# Patient Record
Sex: FEMALE | Race: WHITE | Employment: OTHER | RURAL
[De-identification: names, ages, dates, MRNs, and addresses within clinical notes are randomized per-mention and may not be internally consistent; named-entity substitution may affect disease eponyms.]

---

## 2018-09-19 PROBLEM — J45.909 REACTIVE AIRWAY DISEASE: Status: ACTIVE | Noted: 2018-09-19

## 2018-09-19 PROBLEM — F41.1 GAD (GENERALIZED ANXIETY DISORDER): Status: ACTIVE | Noted: 2018-09-19

## 2018-09-19 PROBLEM — K21.9 GASTROESOPHAGEAL REFLUX DISEASE WITHOUT ESOPHAGITIS: Status: ACTIVE | Noted: 2018-09-19

## 2018-09-19 PROBLEM — I10 HYPERTENSION, ESSENTIAL: Status: ACTIVE | Noted: 2018-09-19

## 2018-09-19 PROBLEM — B18.2 CHRONIC HEPATITIS C WITHOUT HEPATIC COMA (HCC): Status: ACTIVE | Noted: 2018-09-19

## 2018-09-19 PROBLEM — F17.200 SMOKER: Status: ACTIVE | Noted: 2018-09-19

## 2018-12-12 PROBLEM — B18.2 CHRONIC HEPATITIS C WITHOUT HEPATIC COMA (HCC): Status: RESOLVED | Noted: 2018-09-19 | Resolved: 2018-12-12

## 2019-01-30 ENCOUNTER — OFFICE VISIT (OUTPATIENT)
Dept: CARDIOLOGY CLINIC | Age: 59
End: 2019-01-30

## 2019-01-30 VITALS
HEIGHT: 62 IN | BODY MASS INDEX: 31.27 KG/M2 | WEIGHT: 169.9 LBS | HEART RATE: 78 BPM | DIASTOLIC BLOOD PRESSURE: 84 MMHG | SYSTOLIC BLOOD PRESSURE: 144 MMHG | RESPIRATION RATE: 18 BRPM | OXYGEN SATURATION: 94 %

## 2019-01-30 DIAGNOSIS — F17.200 SMOKER: ICD-10-CM

## 2019-01-30 DIAGNOSIS — K21.9 GASTROESOPHAGEAL REFLUX DISEASE WITHOUT ESOPHAGITIS: ICD-10-CM

## 2019-01-30 DIAGNOSIS — E78.5 DYSLIPIDEMIA: ICD-10-CM

## 2019-01-30 DIAGNOSIS — F41.1 GAD (GENERALIZED ANXIETY DISORDER): ICD-10-CM

## 2019-01-30 DIAGNOSIS — I10 HYPERTENSION, ESSENTIAL: Primary | ICD-10-CM

## 2019-01-30 DIAGNOSIS — R07.9 CHEST PAIN, UNSPECIFIED TYPE: ICD-10-CM

## 2019-01-30 PROBLEM — R07.2 PRECORDIAL PAIN: Status: ACTIVE | Noted: 2019-01-30

## 2019-01-30 RX ORDER — DRONABINOL 5 MG/1
5 CAPSULE ORAL AS NEEDED
COMMUNITY

## 2019-01-30 RX ORDER — CARVEDILOL 6.25 MG/1
6.25 TABLET ORAL 2 TIMES DAILY WITH MEALS
Qty: 180 TAB | Refills: 1 | Status: SHIPPED | OUTPATIENT
Start: 2019-01-30 | End: 2020-01-06

## 2019-01-30 NOTE — PROGRESS NOTES
Verified patient with two patient identifiers. Medications reviewed/approved by Dr. Michael Mobley. A verbal from Dr. Michael Mobley was given to remove any medications that were deleted during the visit. Medication(s) removed:  glecaprevir-pibrentasvir (MAVYRET) 100-40 mg tab    Chief Complaint   Patient presents with    Chest Pain     New patient evaluation - referred by Roland Parker NP     Hypertension     1. Have you been to the ER, urgent care clinic since your last visit? Hospitalized since your last visit? new pt.    2. Have you seen or consulted any other health care providers outside of the [address] [place] [address] since your last visit? Include any pap smears or colon screening. New pt.

## 2019-01-30 NOTE — PROGRESS NOTES
Demetri Sweet is a 60 y.o. female is here for new patient evaluation--intermittent CP, hypertension. Seen by Madisyn Brink NP 18 with c/o elevated blood pressure, headaches, dizziness, left arm pain, back pain, and nausea that started a few days ago. She also reports an occasional stabbing chest pain under the left breast that comes and goes. Saw her PCP the week prior and her BP was a little elevated but she was asymptomatic and had not taken her BP meds yet and she was under a lot of stress. At home she has been checking her BP and it has been running in the 180's and 190;s over 100's. She takes Coreg 3.125mg po BID, has been on this for 2 years now ever since she had her first cardiac cath. Her previous cardiologist Dr Sheela Espinal told her everything had \"looked good\" at that point but that she needed the Coreg to help \"regulate her heart. \" Has not seen him since last year. She just moved here from [place] and wanted to establish with  local cardiologist. She does smoke. Is on Lipitor 20mg daily for HLD. Points out that she has 2 xanthelasma lesions on her eyelid. Has not used any Nitro. Never had a blood clot but reports her dad had a MI and  before he turned 48 ot 60. EKG with RSR', PRWP (possible old ant MI), but unchanged. She had labs including Troponin, d-dimer neg, and stress MPI (Lexiscan) on  with normal perfusion/no infarct or ischemia, LVEF 65. Intermittent non-exertional/atypical CP. Smoking down to 5 cigs/day, some current URI sx with NP cough. Elevated chol in past, not checked recently--PCP had discussed possible statin, but pt did not want to take (concerned about liver, side effects, etc)--trying dietary rx.      Patient Active Problem List    Diagnosis Date Noted    Precordial pain 2019    Reactive airway disease 2018    Smoker 2018    JOHN (generalized anxiety disorder) 2018    Gastroesophageal reflux disease without esophagitis 2018  Hypertension, essential 09/19/2018      Lenora Serna MD  Past Medical History:   Diagnosis Date    Arthritis     Cancer [facility])     ovarian    Chronic HCV with state 2 hepatic fibrosis (HCC)     Gastroparesis     Lymph edema     Menopause     Psychiatric disorder     anxiety      Past Surgical History:   Procedure Laterality Date    HX APPENDECTOMY      HX BREAST BIOPSY Left     benign    HX CHOLECYSTECTOMY      HX GYN      total hysterectomy    HX HYSTERECTOMY      HX OOPHORECTOMY       Allergies   Allergen Reactions    Ampicillin Anaphylaxis    Codeine Anaphylaxis    Morphine Anaphylaxis    Penicillins Anaphylaxis      Family History   Problem Relation Age of Onset    Breast Cancer Sister       Social History     Socioeconomic History    Marital status:      Spouse name: Not on file    Number of children: Not on file    Years of education: Not on file    Highest education level: Not on file   Social Needs    Financial resource strain: Not on file    Food insecurity - worry: Not on file    Food insecurity - inability: Not on file   [employer] needs - medical: Not on file   [employer] needs - non-medical: Not on file   Occupational History    Not on file   Tobacco Use    Smoking status: Current Every Day Smoker     Types: Cigarettes    Smokeless tobacco: Never Used    Tobacco comment: 5 cigs/day   Substance and Sexual Activity    Alcohol use: No    Drug use: No    Sexual activity: Not on file   Other Topics Concern    Not on file   Social History Narrative    Not on file      Current Outpatient Medications   Medication Sig    dronabinol (MARINOL) 5 mg capsule Take 5 mg by mouth as needed.  aspirin (ASPIRIN) 325 mg tablet Take 325 mg by mouth daily.  carvedilol (COREG) 3.125 mg tablet Take 2 Tabs by mouth two (2) times daily (with meals).  ALPRAZolam (XANAX) 0.5 mg tablet Take 1 Tab by mouth three (3) times daily as needed for Anxiety.  Max Daily Amount: 1.5 mg.    cyclobenzaprine (FLEXERIL) 10 mg tablet Take 1 Tab by mouth two (2) times a day.  ondansetron (ZOFRAN ODT) 8 mg disintegrating tablet Take 8 mg by mouth every eight (8) hours as needed for Nausea.  pantoprazole (PROTONIX) 40 mg tablet Take 40 mg by mouth two (2) times a day.  gabapentin (NEURONTIN) 300 mg capsule Take 300 mg by mouth three (3) times daily as needed.  cholecalciferol, vitamin D3, (VITAMIN D3 PO) Take  by mouth every seven (7) days. 50,000 units    atorvastatin (LIPITOR) 20 mg tablet Take  by mouth daily.  nitroglycerin (NITROSTAT) 0.4 mg SL tablet 0.4 mg by SubLINGual route every five (5) minutes as needed for Chest Pain. Up to 3 doses. No current facility-administered medications for this visit. Review of Symptoms:    CONST  No weight change. No fever, chills, sweats    ENT No visual changes, URI sx, sore throat    CV  See HPI   RESP  No cough, or sputum, wheezing. Also see HPI   GI  No abdominal pain or change in bowel habits. No heartburn or dysphagia. No melena or rectal bleeding.   No dysuria, urgency, frequency, hematuria   MSKEL  No joint pain, swelling. No muscle pain. SKIN  No rash or lesions. NEURO  No headache, syncope, or seizure. No weakness, loss of sensation, or paresthesias. PSYCH  No low mood or depression  No anxiety. HE/LYMPH  No easy bruising, abnormal bleeding, or enlarged glands.         Physical ExamPhysical Exam:    Visit Vitals  Resp 18   Ht 5' 2\" (1.575 m)   Wt 169 lb 14.4 oz (77.1 kg)   BMI 31.08 kg/m²     Gen: NAD  HEENT:  PERRL, throat clear  Neck: no adenopathy, no thyromegaly, no JVD   Heart:  Regular,Nl S1S2,  no murmur, gallop or rub.   Lungs:  clear  Abdomen:   Soft, non-tender, bowel sounds are active.   Extremities:  No edema  Pulse: symmetric  Neuro: A&O times 3, No focal neuro deficits    Cardiographics    ECG: from 10/18, 12/18--NSR, RSR', PRWP    Labs:   Lab Results   Component Value Date/Time    Sodium 143 05/18/2018 06:15 PM    Potassium 3.8 05/18/2018 06:15 PM    Chloride 104 05/18/2018 06:15 PM    CO2 29 05/18/2018 06:15 PM    Anion gap 10 05/18/2018 06:15 PM    Glucose 107 (H) 05/18/2018 06:15 PM    BUN 14 05/18/2018 06:15 PM    Creatinine 0.70 05/18/2018 06:15 PM    BUN/Creatinine ratio 20 05/18/2018 06:15 PM    GFR est AA >60 05/18/2018 06:15 PM    GFR est non-AA >60 05/18/2018 06:15 PM    Calcium 9.1 05/18/2018 06:15 PM    Bilirubin, total 0.3 05/18/2018 06:15 PM    AST (SGOT) 15 05/18/2018 06:15 PM    Alk. phosphatase 77 05/18/2018 06:15 PM    Protein, total 8.2 05/18/2018 06:15 PM    Albumin 3.5 05/18/2018 06:15 PM    Globulin 4.7 (H) 05/18/2018 06:15 PM    A-G Ratio 0.7 (L) 05/18/2018 06:15 PM    ALT (SGPT) 22 05/18/2018 06:15 PM     Lab Results   Component Value Date/Time    CK 79 12/17/2018 12:55 PM     No results found for: CHOL, CHOLX, CHLST, CHOLV, 977465, HDL, LDL, LDLC, DLDLP, TGLX, TRIGL, TRIGP, CHHD, CHHDX  No results found for this or any previous visit. Assessment:         Patient Active Problem List    Diagnosis Date Noted    Precordial pain 01/30/2019    Reactive airway disease 09/19/2018    Smoker 09/19/2018    JOHN (generalized anxiety disorder) 09/19/2018    Gastroesophageal reflux disease without esophagitis 09/19/2018    Hypertension, essential 09/19/2018     60 y.o. female is here for new patient evaluation--intermittent CP, hypertension. Seen by Eliseo Chowdhury NP 12/7/18 with c/o elevated blood pressure, headaches, dizziness, left arm pain, back pain, and nausea that started a few days ago. She also reports an occasional stabbing chest pain under the left breast that comes and goes. Saw her PCP the week prior and her BP was a little elevated but she was asymptomatic and had not taken her BP meds yet and she was under a lot of stress. At home she has been checking her BP and it has been running in the 180's and 190;s over 100's.  She takes Coreg 3.125mg po BID, has been on this for 2 years now ever since she had her first cardiac cath. Her previous cardiologist Dr Levy Hoffman told her everything had \"looked good\" at that point but that she needed the Coreg to help \"regulate her heart. \" Has not seen him since last year. She just moved here from [place] and wanted to establish with  local cardiologist. She does smoke. Is on Lipitor 20mg daily for HLD. Points out that she has 2 xanthelasma lesions on her eyelid. Has not used any Nitro. Never had a blood clot but reports her dad had a MI and  before he turned 48 ot 60. EKG with RSR', PRWP (possible old ant MI), but unchanged. She had labs including Troponin, d-dimer neg, and stress MPI (Lexiscan) on  with normal perfusion/no infarct or ischemia, LVEF 65. Intermittent non-exertional/atypical CP. Smoking down to 5 cigs/day, some current URI sx with NP cough. 60 y.o. female is here for new patient evaluation--intermittent CP, hypertension. Seen by Delmer Oliva NP 18 with c/o elevated blood pressure, headaches, dizziness, left arm pain, back pain, and nausea that started a few days ago. She also reports an occasional stabbing chest pain under the left breast that comes and goes. Saw her PCP the week prior and her BP was a little elevated but she was asymptomatic and had not taken her BP meds yet and she was under a lot of stress. At home she has been checking her BP and it has been running in the 180's and 190;s over 100's. She takes Coreg 3.125mg po BID, has been on this for 2 years now ever since she had her first cardiac cath. Her previous cardiologist Dr Levy Hoffman told her everything had \"looked good\" at that point but that she needed the Coreg to help \"regulate her heart. \" Has not seen him since last year. She just moved here from [place] and wanted to establish with  local cardiologist. She does smoke. Is on Lipitor 20mg daily for HLD. Points out that she has 2 xanthelasma lesions on her eyelid.  Has not used any Nitro. Never had a blood clot but reports her dad had a MI and  before he turned 48 ot 60. EKG with RSR', PRWP (possible old ant MI), but unchanged. She had labs including Troponin, d-dimer neg, and stress MPI (Lexiscan) on  with normal perfusion/no infarct or ischemia, LVEF 65. Intermittent non-exertional/atypical CP. Smoking down to 5 cigs/day, some current URI sx with NP cough. Elevated chol in past, not checked recently--PCP had discussed possible statin, but pt did not want to take (concerned about liver, side effects, etc)--trying dietary rx. Plan:     Echo/doppler--call w/ results  Check lipids, CMP  Smoking cessation discusssed  Continue current meds Incl coreg at 6.25mg bid  Review outside records (incl cath)  Dietary rx (prob familiial hyperlipidemia, ? PCKS9 type drug as statin issues/concerns)  F/u in 6 months.     Shannan Pinedo MD

## 2019-01-31 DIAGNOSIS — E78.5 DYSLIPIDEMIA: ICD-10-CM

## 2019-02-22 ENCOUNTER — TELEPHONE (OUTPATIENT)
Dept: CARDIOLOGY CLINIC | Age: 59
End: 2019-02-22

## 2019-02-22 NOTE — TELEPHONE ENCOUNTER
----- Message from Vicki Bowman MD sent at 2/19/2019  1:04 PM EST -----  Regarding: echo  Advise echo looks fine, normal LV pumping function, valves ok (trace mitral regurg only). Thanks [employer]  Advise CMP normal, lipids look good--total chol 184,  (would like <100), HDL and TG normal.  Thanks [employer]      Spoke with the patient. Verified patient with two patient identifiers. Results given and questions answered. Patient verbalized understanding.     APPT:  Tuesday, September 03, 2019 10:20 AM

## 2020-01-06 RX ORDER — CARVEDILOL 6.25 MG/1
TABLET ORAL
Qty: 180 TAB | Refills: 1 | Status: SHIPPED | OUTPATIENT
Start: 2020-01-06 | End: 2020-08-28 | Stop reason: SDUPTHER

## 2020-03-18 PROBLEM — G89.29 CHRONIC BILATERAL BACK PAIN: Status: ACTIVE | Noted: 2020-03-18

## 2020-03-18 PROBLEM — F43.10 PTSD (POST-TRAUMATIC STRESS DISORDER): Status: ACTIVE | Noted: 2020-03-18

## 2020-03-18 PROBLEM — M54.9 CHRONIC BILATERAL BACK PAIN: Status: ACTIVE | Noted: 2020-03-18

## 2020-04-15 RX ORDER — CIPROFLOXACIN 500 MG/1
500 TABLET ORAL 2 TIMES DAILY
Qty: 20 TAB | Refills: 0 | Status: SHIPPED | OUTPATIENT
Start: 2020-04-15 | End: 2020-04-25

## 2020-06-03 ENCOUNTER — OFFICE VISIT (OUTPATIENT)
Dept: SURGERY | Age: 60
End: 2020-06-03

## 2020-06-03 VITALS
SYSTOLIC BLOOD PRESSURE: 132 MMHG | BODY MASS INDEX: 32.57 KG/M2 | WEIGHT: 177 LBS | HEART RATE: 78 BPM | HEIGHT: 62 IN | RESPIRATION RATE: 18 BRPM | TEMPERATURE: 98.4 F | DIASTOLIC BLOOD PRESSURE: 76 MMHG | OXYGEN SATURATION: 92 %

## 2020-06-03 DIAGNOSIS — R19.8 UMBILICAL DISCHARGE: Primary | ICD-10-CM

## 2020-06-03 NOTE — PATIENT INSTRUCTIONS
CT Scan of the Abdomen: About This Test 
What is it? A CT (computed tomography) scan uses X-rays to make detailed pictures of your body and structures inside your body. A CT scan of the abdomen (belly) can give your doctor information about your liver, pancreas, kidneys, and other structures in your belly. During the test, you will lie on a table that is attached to the [employer] Video. The CT scanner is a large doughnut-shaped machine. Why is this test done? A CT scan of the belly can help find problems such as kidney stones, infected pouches in the colon (diverticulitis), and appendicitis. It also helps find tumors and abscesses. How do you prepare for the test? 
In general, there's nothing you have to do before this test, unless your doctor tells you to. · Tell your doctor if you get nervous in tight spaces. You may get a medicine to help you relax. If you think you'll get this medicine, be sure to arrange a ride home. · You may be asked to not eat any solid foods starting the night before your scan. How is the test done? · You may have contrast material (dye) put into your arm through a tube called an IV. Or you may drink the contrast material, or it may be put through a tube into your bladder or rectum. · You will lie on a table that is attached to the CT scanner. · The table slides into the round opening of the scanner. The table will move during the scan. The scanner moves inside the doughnut-shaped casing around your body. · You will be asked to hold still during the scan. You may be asked to hold your breath for short periods. · You may be alone in the scanning room. But a technologist will watch you through a window and talk with you during the test. 
How does the test feel? The test will not cause pain. But some people feel nervous inside the CT scanner.  
If a medicine to help you relax (sedative) or dye is used, you may feel a quick sting or pinch when the IV is started. The dye may make you feel warm and flushed and give you a metallic taste in your mouth. Some people feel sick to their stomach or get a headache. Tell the technologist or your doctor how you are feeling. How long does the test take? The test will take about 30 to 60 minutes. Most of this time is spent getting ready for the scan. The actual test only takes a few minutes. What are the risks of the test? 
The chance of a CT scan causing a problem is small. · There is a chance of an allergic reaction to the dye (contrast material). · If you breastfeed and are concerned about whether the contrast material used in this test is safe, talk to your doctor. Most experts believe that very little dye passes into breast milk and even less is passed on to the baby. But if you are concerned, you can stop breastfeeding for up to 24 hours after the test. During this time, you can give your baby breast milk that you stored before the test. Don't use the breast milk you pump in the 24 hours after the test. Throw it out. · There is a risk of damage to cells or tissue from being exposed to radiation, including the small amounts used in CTs, X-rays, and other medical tests. Over time, exposure to radiation may cause cancer and other health problems. But in most cases, the risk of getting cancer from being exposed to small amounts of radiation is low. It is not a reason to avoid these tests for most people. What happens after the test? 
· You will probably be able to go home right away, depending on the reason for the test. 
· You can go back to your usual activities right away. · If dye was used, drink lots of liquids for 24 hours after the test, unless your doctor says not to. When should you call for help? Watch closely for changes in your health, and be sure to contact your doctor if you have any problems. Follow-up care is a key part of your treatment and safety.  Be sure to make and go to all appointments, and call your doctor if you are having problems. It's also a good idea to keep a list of the medicines you take. Ask your doctor when you can expect to have your test results. Where can you learn more? Go to http://beth-ellie.info/ Enter T679 in the search box to learn more about \"CT Scan of the Abdomen: About This Test.\" Current as of: December 9, 2019               Content Version: 12.5 © 3368-8884 [employer]. Care instructions adapted under license by [employer] (which disclaims liability or warranty for this information). If you have questions about a medical condition or this instruction, always ask your healthcare professional. [address] any warranty or liability for your use of this information.

## 2020-06-03 NOTE — PROGRESS NOTES
1. Have you been to the ER, urgent care clinic since your last visit? Hospitalized since your last visit? No    2. Have you seen or consulted any other health care providers outside of the [address] [place] [address] since your last visit? Include any pap smears or colon screening.  No

## 2020-06-03 NOTE — PROGRESS NOTES
Kalpana Arevalo is a 60 y.o. female who presents today with the following:  Chief Complaint   Patient presents with    Umbilical Hernia     drainage       HPI    60-year-old female who presents to us as a referral by Dr. Mary Alice Wright for evaluation of a chronic problem she has had at her umbilicus. About 5 years ago she [place] She is unaware if they utilized mesh or not. She states within a few months of the procedure she developed discomfort in that area and it sounds like she had a ultrasound-guided aspiration of a seroma. About a year and a half ago she started to have pressure and intermittent drainage from her umbilicus. She also would occasionally have fevers. She was placed on multiple rounds of antibiotics and this would wax and wane. Again she has had multiple episodes over the last year and a half. She also has some discomfort in the area and is sent to us for evaluation. The last time she had any drainage was about 3 weeks ago. She does take 650 mg of aspirin a day and states that her EKG has shown that she has had an MI in the past although she never recalled having any symptoms. She smokes about 3 cigarettes a day and about a year ago she used to smoke up to a pack a day. She has had a hysterectomy and a subsequent bilateral salpingo-oophorectomy.   Past Medical History:   Diagnosis Date    Arthritis     Cancer ([address]     ovarian    Chronic HCV with state 2 hepatic fibrosis (HCC)     Dyslipidemia     Gastroparesis     Lymph edema     Menopause     Psychiatric disorder     anxiety       Past Surgical History:   Procedure Laterality Date    HX APPENDECTOMY      HX BREAST BIOPSY Left     benign    HX CHOLECYSTECTOMY      HX GYN      total hysterectomy    HX HYSTERECTOMY      HX OOPHORECTOMY         Social History     Socioeconomic History    Marital status:      Spouse name: Not on file    Number of children: Not on file    Years of education: Not on file    Highest education level: Not on file   Occupational History    Not on file   Social Needs    Financial resource strain: Not on file    Food insecurity     Worry: Not on file     Inability: Not on file    Transportation needs     Medical: Not on file     Non-medical: Not on file   Tobacco Use    Smoking status: Current Every Day Smoker     Types: Cigarettes    Smokeless tobacco: Never Used    Tobacco comment: 5 cigs/day   Substance and Sexual Activity    Alcohol use: No    Drug use: No    Sexual activity: Not on file   Lifestyle    Physical activity     Days per week: Not on file     Minutes per session: Not on file    Stress: Not on file   Relationships    Social connections     Talks on phone: Not on file     Gets together: Not on file     Attends Oriental orthodox service: Not on file     Active member of club or organization: Not on file     Attends meetings of clubs or organizations: Not on file     Relationship status: Not on file    Intimate partner violence     Fear of current or ex partner: Not on file     Emotionally abused: Not on file     Physically abused: Not on file     Forced sexual activity: Not on file   Other Topics Concern    Not on file   Social History Narrative    Not on file       Family History   Problem Relation Age of Onset    Breast Cancer Sister     No Known Problems Mother        Allergies   Allergen Reactions    Ampicillin Anaphylaxis    Codeine Anaphylaxis    Morphine Anaphylaxis    Penicillins Anaphylaxis       Current Outpatient Medications   Medication Sig    ALPRAZolam (Xanax) 0.5 mg tablet Take 1 Tab by mouth three (3) times daily as needed for Anxiety. Max Daily Amount: 1.5 mg.    cyclobenzaprine (FLEXERIL) 10 mg tablet Take 1 Tab by mouth two (2) times a day. Indications: as needed    gabapentin (NEURONTIN) 300 mg capsule Take 1 Cap by mouth three (3) times daily as needed for Pain. Max Daily Amount: 900 mg.     hydroCHLOROthiazide (HYDRODIURIL) 25 mg tablet Take 1 Tab by mouth daily.  carvedilol (COREG) 6.25 mg tablet TAKE 1 TABLET BY MOUTH TWICE DAILY WITH MEALS    albuterol (PROVENTIL HFA, VENTOLIN HFA, PROAIR HFA) 90 mcg/actuation inhaler Take 2 Puffs by inhalation every four (4) hours as needed for Wheezing.  Nebulizer & Compressor machine Use q 4 hours as needed for dyspnea or wheezing    albuterol (PROVENTIL VENTOLIN) 2.5 mg /3 mL (0.083 %) nebulizer solution 3 mL by Nebulization route every four (4) hours as needed for Wheezing.  dronabinol (MARINOL) 5 mg capsule Take 5 mg by mouth as needed.  aspirin (ASPIRIN) 325 mg tablet Take 325 mg by mouth daily.  ondansetron (ZOFRAN ODT) 8 mg disintegrating tablet Take 8 mg by mouth every eight (8) hours as needed for Nausea.  pantoprazole (PROTONIX) 40 mg tablet Take 40 mg by mouth two (2) times a day.  nitroglycerin (NITROSTAT) 0.4 mg SL tablet 0.4 mg by SubLINGual route every five (5) minutes as needed for Chest Pain. Up to 3 doses.  cholecalciferol, vitamin D3, (VITAMIN D3 PO) Take  by mouth every seven (7) days. 50,000 units     No current facility-administered medications for this visit. The above histories, medications and allergies have been reviewed. Review of Systems   HENT: Positive for tinnitus. Cardiovascular: Positive for chest pain. Gastrointestinal: Positive for abdominal pain, heartburn and nausea. Genitourinary: Positive for frequency. Musculoskeletal: Positive for back pain, falls, joint pain and myalgias. Endo/Heme/Allergies: Bruises/bleeds easily. Psychiatric/Behavioral: The patient is nervous/anxious and has insomnia. Visit Vitals  /76 (BP 1 Location: Left arm, BP Patient Position: Sitting)   Pulse 78   Temp 98.4 °F (36.9 °C) (Temporal)   Resp 18   Ht 5' 2\" (1.575 m)   Wt 177 lb (80.3 kg)   SpO2 92%   BMI 32.37 kg/m²     Physical Exam  Constitutional:       Appearance: She is obese.    Cardiovascular:      Rate and Rhythm: Normal rate and regular rhythm. Pulmonary:      Effort: Pulmonary effort is normal.      Breath sounds: Normal breath sounds. Abdominal:      General: There is no distension. Palpations: Abdomen is soft. Tenderness: There is no abdominal tenderness. Hernia: No hernia is present. Comments: There is no active drainage from her umbilicus. There is no erythema or induration. On palpation there is a little bit of nodularity that may represent either sutures or mesh but no fluctuance. Neurological:      Mental Status: She is alert. 1. Umbilical discharge  Chronic recurrent umbilical drainage in a patient who about 5 years ago had an umbilical hernia repair. We are uncertain if these measure they did not. I would like to start with a CT scan of the abdomen and pelvis to rule out evidence of a fistula. We will have her follow-up after imaging.  - CT ABD PELV W CONT; Future      Follow-up and Dispositions    · Return in about 2 weeks (around 6/17/2020) for after testing.          Jany Grove MD

## 2020-07-14 ENCOUNTER — OFFICE VISIT (OUTPATIENT)
Dept: PRIMARY CARE CLINIC | Age: 60
End: 2020-07-14

## 2020-07-14 DIAGNOSIS — Z20.828 EXPOSURE TO SARS-ASSOCIATED CORONAVIRUS: Primary | ICD-10-CM

## 2020-07-19 LAB — SARS-COV-2, NAA: NOT DETECTED

## 2020-08-31 RX ORDER — CARVEDILOL 6.25 MG/1
6.25 TABLET ORAL 2 TIMES DAILY WITH MEALS
Qty: 60 TAB | Refills: 0 | Status: SHIPPED | OUTPATIENT
Start: 2020-08-31 | End: 2020-09-22

## 2020-09-22 RX ORDER — CARVEDILOL 6.25 MG/1
TABLET ORAL
Qty: 60 TAB | Refills: 0 | Status: SHIPPED | OUTPATIENT
Start: 2020-09-22 | End: 2020-10-20 | Stop reason: SDUPTHER

## 2020-10-01 PROBLEM — R19.8 UMBILICAL DISCHARGE: Status: RESOLVED | Noted: 2020-06-03 | Resolved: 2020-10-01

## 2020-10-01 PROBLEM — R07.2 PRECORDIAL PAIN: Status: RESOLVED | Noted: 2019-01-30 | Resolved: 2020-10-01

## 2021-05-11 ENCOUNTER — OFFICE VISIT (OUTPATIENT)
Dept: FAMILY MEDICINE CLINIC | Age: 61
End: 2021-05-11
Payer: MEDICARE

## 2021-05-11 VITALS
DIASTOLIC BLOOD PRESSURE: 86 MMHG | WEIGHT: 168 LBS | OXYGEN SATURATION: 97 % | SYSTOLIC BLOOD PRESSURE: 122 MMHG | RESPIRATION RATE: 20 BRPM | HEART RATE: 79 BPM | TEMPERATURE: 97.6 F | HEIGHT: 62 IN | BODY MASS INDEX: 30.91 KG/M2

## 2021-05-11 DIAGNOSIS — M25.511 CHRONIC PAIN OF BOTH SHOULDERS: ICD-10-CM

## 2021-05-11 DIAGNOSIS — I10 HYPERTENSION, ESSENTIAL: Primary | ICD-10-CM

## 2021-05-11 DIAGNOSIS — G89.29 CHRONIC PAIN OF BOTH SHOULDERS: ICD-10-CM

## 2021-05-11 DIAGNOSIS — M25.512 CHRONIC PAIN OF BOTH SHOULDERS: ICD-10-CM

## 2021-05-11 PROCEDURE — G8417 CALC BMI ABV UP PARAM F/U: HCPCS | Performed by: FAMILY MEDICINE

## 2021-05-11 PROCEDURE — 99213 OFFICE O/P EST LOW 20 MIN: CPT | Performed by: FAMILY MEDICINE

## 2021-05-11 PROCEDURE — G9899 SCRN MAM PERF RSLTS DOC: HCPCS | Performed by: FAMILY MEDICINE

## 2021-05-11 PROCEDURE — G8510 SCR DEP NEG, NO PLAN REQD: HCPCS | Performed by: FAMILY MEDICINE

## 2021-05-11 PROCEDURE — 3017F COLORECTAL CA SCREEN DOC REV: CPT | Performed by: FAMILY MEDICINE

## 2021-05-11 PROCEDURE — G8427 DOCREV CUR MEDS BY ELIG CLIN: HCPCS | Performed by: FAMILY MEDICINE

## 2021-05-11 PROCEDURE — G8754 DIAS BP LESS 90: HCPCS | Performed by: FAMILY MEDICINE

## 2021-05-11 PROCEDURE — G8752 SYS BP LESS 140: HCPCS | Performed by: FAMILY MEDICINE

## 2021-05-11 NOTE — PROGRESS NOTES
Justice Hyde is a 63 y.o. female who presents with the following:  Chief Complaint   Patient presents with    Hypertension    Shoulder Pain       Patient had been having pain in her shoulders and in her head and while in pain she will check her blood pressure and at home the systolic would be elevated. When she is here today her blood pressure is normal.  I told the patient that she should bring her blood pressure cuff she had to be checked against ours which she has not done yet and she should not be taking her blood pressure when she is in pain because that will make her blood pressure go up. The patient is having more difficulty moving her shoulders now especially the left one as she is developing frozen shoulder and needs to go to physical therapy. Allergies   Allergen Reactions    Ampicillin Anaphylaxis    Codeine Anaphylaxis    Morphine Anaphylaxis    Penicillins Anaphylaxis       Current Outpatient Medications   Medication Sig    hydroCHLOROthiazide (HYDRODIURIL) 25 mg tablet Take 1 tablet by mouth once daily    cyclobenzaprine (FLEXERIL) 10 mg tablet TAKE 1 TABLET BY MOUTH TWICE A DAY AS NEEDED    ALPRAZolam (Xanax) 0.5 mg tablet Take 1 Tab by mouth three (3) times daily as needed for Anxiety. Max Daily Amount: 1.5 mg.    carvediloL (COREG) 12.5 mg tablet Take 1 pill BID    citalopram (CELEXA) 20 mg tablet Take 1 Tab by mouth daily.  albuterol (PROVENTIL VENTOLIN) 2.5 mg /3 mL (0.083 %) nebu 3 mL by Nebulization route every four (4) hours as needed for Wheezing.  gabapentin (NEURONTIN) 300 mg capsule Take 1 Cap by mouth three (3) times daily as needed for Pain. Max Daily Amount: 900 mg.    albuterol (PROVENTIL HFA, VENTOLIN HFA, PROAIR HFA) 90 mcg/actuation inhaler Take 2 Puffs by inhalation every four (4) hours as needed for Wheezing.     Nebulizer & Compressor machine Use q 4 hours as needed for dyspnea or wheezing    dronabinol (MARINOL) 5 mg capsule Take 5 mg by mouth as needed.  aspirin (ASPIRIN) 325 mg tablet Take 325 mg by mouth daily.  ondansetron (ZOFRAN ODT) 8 mg disintegrating tablet Take 8 mg by mouth every eight (8) hours as needed for Nausea.  pantoprazole (PROTONIX) 40 mg tablet Take 40 mg by mouth two (2) times a day.  nitroglycerin (NITROSTAT) 0.4 mg SL tablet 0.4 mg by SubLINGual route every five (5) minutes as needed for Chest Pain. Up to 3 doses. No current facility-administered medications for this visit. Past Medical History:   Diagnosis Date    Arthritis     Cancer ([facility] [address]     ovarian    Chronic HCV with state 2 hepatic fibrosis (HCC)     Dyslipidemia     Gastroparesis     Lymph edema     Menopause     Precordial pain 1/30/2019    Psychiatric disorder     anxiety    Umbilical discharge 2/7/0573       Past Surgical History:   Procedure Laterality Date    HX APPENDECTOMY      HX BREAST BIOPSY Left     benign    HX CHOLECYSTECTOMY      HX GYN      total hysterectomy    HX HYSTERECTOMY      HX OOPHORECTOMY         Family History   Problem Relation Age of Onset    Breast Cancer Sister     No Known Problems Mother        Social History     Socioeconomic History    Marital status:      Spouse name: Not on file    Number of children: Not on file    Years of education: Not on file    Highest education level: Not on file   Tobacco Use    Smoking status: Current Every Day Smoker     Types: Cigarettes    Smokeless tobacco: Never Used    Tobacco comment: 5 cigs/day   Substance and Sexual Activity    Alcohol use: No    Drug use: No       Review of Systems   Constitutional: Negative for chills, fever, malaise/fatigue and weight loss. HENT: Positive for tinnitus. Negative for congestion, hearing loss and sore throat. Eyes: Negative for blurred vision, pain and discharge. Respiratory: Negative for cough, shortness of breath and wheezing.     Cardiovascular: Negative for chest pain, palpitations, orthopnea, claudication and leg swelling. Gastrointestinal: Negative for abdominal pain, constipation and heartburn. Genitourinary: Negative for dysuria, frequency and urgency. Musculoskeletal: Positive for joint pain. Negative for falls and myalgias. Skin: Negative for itching and rash. Neurological: Positive for dizziness and headaches. Negative for tingling and tremors. Endo/Heme/Allergies: Negative for environmental allergies and polydipsia. Psychiatric/Behavioral: Negative for depression and substance abuse. The patient is not nervous/anxious. Visit Vitals  /86 (BP 1 Location: Left arm)   Pulse 79   Temp 97.6 °F (36.4 °C)   Resp 20   Ht 5' 2\" (1.575 m)   Wt 168 lb (76.2 kg)   SpO2 97%   BMI 30.73 kg/m²     Physical Exam  Vitals signs reviewed. Constitutional:       General: She is not in acute distress. Appearance: Normal appearance. She is obese. She is not ill-appearing. HENT:      Head: Normocephalic and atraumatic. Right Ear: Tympanic membrane, ear canal and external ear normal.      Left Ear: Tympanic membrane, ear canal and external ear normal.      Nose: Nose normal. No congestion or rhinorrhea. Mouth/Throat:      Mouth: Mucous membranes are moist.      Pharynx: No posterior oropharyngeal erythema. Eyes:      General:         Right eye: No discharge. Left eye: No discharge. Extraocular Movements: Extraocular movements intact. Conjunctiva/sclera: Conjunctivae normal.      Pupils: Pupils are equal, round, and reactive to light. Comments: Cornea anterior chamber and iris are normal.   Neck:      Musculoskeletal: Normal range of motion and neck supple. Trachea: No tracheal deviation. Cardiovascular:      Rate and Rhythm: Normal rate and regular rhythm. Pulses: Normal pulses. Heart sounds: Normal heart sounds. No murmur. No friction rub. No gallop. Pulmonary:      Effort: Pulmonary effort is normal. No respiratory distress.       Breath sounds: Normal breath sounds. No wheezing or rhonchi. Chest:      Chest wall: No tenderness. Abdominal:      General: Bowel sounds are normal. There is no distension. Palpations: Abdomen is soft. There is no mass. Tenderness: There is no abdominal tenderness. There is no guarding or rebound. Musculoskeletal:         General: Tenderness (In both shoulder joints and in the left thigh) present. No deformity. Right lower leg: No edema. Left lower leg: No edema. Lymphadenopathy:      Cervical: No cervical adenopathy. Skin:     General: Skin is warm and dry. Coloration: Skin is not pale. Findings: No erythema or rash. Neurological:      General: No focal deficit present. Mental Status: She is alert and oriented to person, place, and time. Cranial Nerves: No cranial nerve deficit. Motor: No abnormal muscle tone. Deep Tendon Reflexes: Reflexes are normal and symmetric. Reflexes normal.      Comments: Cranial nerves II through XII are intact sensory and motor. Biceps triceps knee and ankle DTRs are normal and symmetrical.   Psychiatric:         Mood and Affect: Mood normal.         Behavior: Behavior normal.           ICD-10-CM ICD-9-CM    1. Hypertension, essential  I10 401.9    2.  Chronic pain of both shoulders  M25.511 719.41 REFERRAL TO PHYSICAL THERAPY    G89.29 338.29     M25.512         Orders Placed This Encounter    REFERRAL TO PHYSICAL THERAPY     Referral Priority:   Routine     Referral Type:   PT/OT/ST     Referral Reason:   Specialty Services Required     Requested Specialty:   Physical Therapy     Number of Visits Requested:   1           Caitlin Shaw MD

## 2021-05-11 NOTE — PROGRESS NOTES
1. Have you been to the ER, urgent care clinic since your last visit? Hospitalized since your last visit?no    2. Have you seen or consulted any other health care providers outside of the [address] [place] [address] since your last visit? Include any pap smears or colon screening.  no

## 2021-05-20 ENCOUNTER — OFFICE VISIT (OUTPATIENT)
Dept: FAMILY MEDICINE CLINIC | Age: 61
End: 2021-05-20
Payer: MEDICARE

## 2021-05-20 VITALS
HEIGHT: 62 IN | HEART RATE: 66 BPM | BODY MASS INDEX: 31.65 KG/M2 | SYSTOLIC BLOOD PRESSURE: 130 MMHG | DIASTOLIC BLOOD PRESSURE: 70 MMHG | TEMPERATURE: 97.6 F | WEIGHT: 172 LBS | RESPIRATION RATE: 18 BRPM | OXYGEN SATURATION: 93 %

## 2021-05-20 DIAGNOSIS — F43.10 PTSD (POST-TRAUMATIC STRESS DISORDER): ICD-10-CM

## 2021-05-20 DIAGNOSIS — F41.1 GAD (GENERALIZED ANXIETY DISORDER): Primary | ICD-10-CM

## 2021-05-20 PROCEDURE — G8754 DIAS BP LESS 90: HCPCS | Performed by: FAMILY MEDICINE

## 2021-05-20 PROCEDURE — 99213 OFFICE O/P EST LOW 20 MIN: CPT | Performed by: FAMILY MEDICINE

## 2021-05-20 PROCEDURE — G8427 DOCREV CUR MEDS BY ELIG CLIN: HCPCS | Performed by: FAMILY MEDICINE

## 2021-05-20 PROCEDURE — 3017F COLORECTAL CA SCREEN DOC REV: CPT | Performed by: FAMILY MEDICINE

## 2021-05-20 PROCEDURE — G9899 SCRN MAM PERF RSLTS DOC: HCPCS | Performed by: FAMILY MEDICINE

## 2021-05-20 PROCEDURE — G8752 SYS BP LESS 140: HCPCS | Performed by: FAMILY MEDICINE

## 2021-05-20 PROCEDURE — G8510 SCR DEP NEG, NO PLAN REQD: HCPCS | Performed by: FAMILY MEDICINE

## 2021-05-20 PROCEDURE — G8417 CALC BMI ABV UP PARAM F/U: HCPCS | Performed by: FAMILY MEDICINE

## 2021-05-20 RX ORDER — ALPRAZOLAM 0.5 MG/1
0.5 TABLET ORAL
Qty: 90 TABLET | Refills: 2 | Status: SHIPPED | OUTPATIENT
Start: 2021-05-20 | End: 2021-09-28 | Stop reason: SDUPTHER
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
## 2021-05-20 NOTE — PROGRESS NOTES
1. Have you been to the ER, urgent care clinic since your last visit? Hospitalized since your last visit?no    2. Have you seen or consulted any other health care providers outside of the [address] [place] [address] since your last visit? Include any pap smears or colon screening.  no

## 2021-06-16 ENCOUNTER — OFFICE VISIT (OUTPATIENT)
Dept: FAMILY MEDICINE CLINIC | Age: 61
End: 2021-06-16
Payer: MEDICARE

## 2021-06-16 VITALS
HEIGHT: 62 IN | BODY MASS INDEX: 30.78 KG/M2 | TEMPERATURE: 98 F | DIASTOLIC BLOOD PRESSURE: 80 MMHG | RESPIRATION RATE: 18 BRPM | OXYGEN SATURATION: 96 % | WEIGHT: 167.25 LBS | SYSTOLIC BLOOD PRESSURE: 136 MMHG | HEART RATE: 70 BPM
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
## 2021-06-16 DIAGNOSIS — M25.552 HIP PAIN, ACUTE, LEFT: ICD-10-CM

## 2021-06-16 DIAGNOSIS — Z01.419 GYNECOLOGIC EXAM NORMAL: Primary | ICD-10-CM

## 2021-06-16 PROCEDURE — G9899 SCRN MAM PERF RSLTS DOC: HCPCS | Performed by: FAMILY MEDICINE

## 2021-06-16 PROCEDURE — G8754 DIAS BP LESS 90: HCPCS | Performed by: FAMILY MEDICINE

## 2021-06-16 PROCEDURE — 3017F COLORECTAL CA SCREEN DOC REV: CPT | Performed by: FAMILY MEDICINE

## 2021-06-16 PROCEDURE — G8752 SYS BP LESS 140: HCPCS | Performed by: FAMILY MEDICINE

## 2021-06-16 PROCEDURE — G8417 CALC BMI ABV UP PARAM F/U: HCPCS | Performed by: FAMILY MEDICINE

## 2021-06-16 PROCEDURE — 99396 PREV VISIT EST AGE 40-64: CPT | Performed by: FAMILY MEDICINE

## 2021-06-16 PROCEDURE — G8510 SCR DEP NEG, NO PLAN REQD: HCPCS | Performed by: FAMILY MEDICINE

## 2021-06-16 NOTE — PROGRESS NOTES
1. Have you been to the ER, urgent care clinic since your last visit? Hospitalized since your last visit? No    2. Have you seen or consulted any other health care providers outside of the [address] [place] [address] since your last visit? Include any pap smears or colon screening.  No

## 2021-06-16 NOTE — PROGRESS NOTES
Paty Barrera is a 63 y.o. female who presents with the following:  Chief Complaint   Patient presents with   Meera Espinal Exam       Patient in today for general medical examination and GYN examination. Patient is status post hysterectomy and later a salpingo-oophorectomy. Patient states that her right breast is recovering from her fall where she bruised it rather severely but there is a brownish staining on it. The patient also complains of having persisting pain in the left hip that is gotten worse since the fall. Allergies   Allergen Reactions    Ampicillin Anaphylaxis    Codeine Anaphylaxis    Morphine Anaphylaxis    Penicillins Anaphylaxis       Current Outpatient Medications   Medication Sig    ALPRAZolam (Xanax) 0.5 mg tablet Take 1 Tablet by mouth three (3) times daily as needed for Anxiety. Max Daily Amount: 1.5 mg.    hydroCHLOROthiazide (HYDRODIURIL) 25 mg tablet Take 1 tablet by mouth once daily    cyclobenzaprine (FLEXERIL) 10 mg tablet TAKE 1 TABLET BY MOUTH TWICE A DAY AS NEEDED    carvediloL (COREG) 12.5 mg tablet Take 1 pill BID    citalopram (CELEXA) 20 mg tablet Take 1 Tab by mouth daily.  albuterol (PROVENTIL VENTOLIN) 2.5 mg /3 mL (0.083 %) nebu 3 mL by Nebulization route every four (4) hours as needed for Wheezing.  gabapentin (NEURONTIN) 300 mg capsule Take 1 Cap by mouth three (3) times daily as needed for Pain. Max Daily Amount: 900 mg.    albuterol (PROVENTIL HFA, VENTOLIN HFA, PROAIR HFA) 90 mcg/actuation inhaler Take 2 Puffs by inhalation every four (4) hours as needed for Wheezing.  Nebulizer & Compressor machine Use q 4 hours as needed for dyspnea or wheezing    dronabinol (MARINOL) 5 mg capsule Take 5 mg by mouth as needed.  aspirin (ASPIRIN) 325 mg tablet Take 325 mg by mouth daily.  ondansetron (ZOFRAN ODT) 8 mg disintegrating tablet Take 8 mg by mouth every eight (8) hours as needed for Nausea.     pantoprazole (PROTONIX) 40 mg tablet Take 40 mg by mouth two (2) times a day.  nitroglycerin (NITROSTAT) 0.4 mg SL tablet 0.4 mg by SubLINGual route every five (5) minutes as needed for Chest Pain. Up to 3 doses. No current facility-administered medications for this visit. Past Medical History:   Diagnosis Date    Arthritis     Cancer ([facility] [address]     ovarian    Chronic HCV with state 2 hepatic fibrosis ([facility] [address]     Dyslipidemia     Gastroparesis     Lymph edema     Menopause     Precordial pain 1/30/2019    Psychiatric disorder     anxiety    Umbilical discharge 1/5/4573       Past Surgical History:   Procedure Laterality Date    HX APPENDECTOMY      HX BREAST BIOPSY Left     benign    HX CHOLECYSTECTOMY      HX GYN      total hysterectomy    HX HYSTERECTOMY      HX OOPHORECTOMY         Family History   Problem Relation Age of Onset    Breast Cancer Sister     No Known Problems Mother        Social History     Socioeconomic History    Marital status:      Spouse name: Not on file    Number of children: Not on file    Years of education: Not on file    Highest education level: Not on file   Tobacco Use    Smoking status: Current Every Day Smoker     Types: Cigarettes    Smokeless tobacco: Never Used    Tobacco comment: 5 cigs/day   Substance and Sexual Activity    Alcohol use: No    Drug use: No     Social Determinants of Health     Financial Resource Strain:     Difficulty of Paying Living Expenses:    Food Insecurity:     Worried About Running Out of Food in the Last Year:     Ran Out of Food in the Last Year:    Transportation Needs:     Lack of Transportation (Medical):      Lack of Transportation (Non-Medical):    Physical Activity:     Days of Exercise per Week:     Minutes of Exercise per Session:    Stress:     Feeling of Stress :    Social Connections:     Frequency of Communication with Friends and Family:     Frequency of Social Gatherings with Friends and Family:     Attends [employer] Services:     Active Member of Clubs or Organizations:     Attends Club or Organization Meetings:     Marital Status:        Review of Systems   Constitutional: Negative for chills, fever, malaise/fatigue and weight loss. HENT: Negative for congestion, hearing loss, sore throat and tinnitus. Eyes: Negative for blurred vision, pain and discharge. Respiratory: Negative for cough, shortness of breath and wheezing. Cardiovascular: Negative for chest pain, palpitations, orthopnea, claudication and leg swelling. Gastrointestinal: Negative for abdominal pain, constipation and heartburn. Genitourinary: Negative for dysuria, frequency and urgency. Musculoskeletal: Positive for joint pain (Left hip with pain radiating to the knee). Negative for falls and myalgias. Skin: Negative for itching and rash. Neurological: Negative for dizziness, tingling, tremors and headaches. Endo/Heme/Allergies: Negative for environmental allergies and polydipsia. Psychiatric/Behavioral: Negative for depression and substance abuse. The patient is not nervous/anxious. Visit Vitals  /80   Pulse 70   Temp 98 °F (36.7 °C) (Temporal)   Resp 18   Ht 5' 2\" (1.575 m)   Wt 167 lb 4 oz (75.9 kg)   SpO2 96%   BMI 30.59 kg/m²     Physical Exam  Vitals reviewed. Constitutional:       General: She is not in acute distress. Appearance: Normal appearance. She is obese. She is not ill-appearing. HENT:      Head: Normocephalic and atraumatic. Right Ear: Tympanic membrane, ear canal and external ear normal.      Left Ear: Tympanic membrane, ear canal and external ear normal.      Nose: Nose normal. No congestion or rhinorrhea. Mouth/Throat:      Mouth: Mucous membranes are moist.      Pharynx: No posterior oropharyngeal erythema. Eyes:      General:         Right eye: No discharge. Left eye: No discharge. Extraocular Movements: Extraocular movements intact.       Conjunctiva/sclera: Conjunctivae normal.      Pupils: Pupils are equal, round, and reactive to light. Comments: Cornea anterior chamber and iris are normal.   Neck:      Trachea: No tracheal deviation. Cardiovascular:      Rate and Rhythm: Normal rate and regular rhythm. Pulses: Normal pulses. Heart sounds: Normal heart sounds. No murmur heard. No friction rub. No gallop. Pulmonary:      Effort: Pulmonary effort is normal. No respiratory distress. Breath sounds: Normal breath sounds. No wheezing or rhonchi. Chest:      Chest wall: No tenderness. Abdominal:      General: Bowel sounds are normal. There is no distension. Palpations: Abdomen is soft. There is no mass. Tenderness: There is no abdominal tenderness. There is no guarding or rebound. Genitourinary:     General: Normal vulva. Vagina: No vaginal discharge. Rectum: Normal.      Comments: Patient's lymph nodes are normal in her cervical supraclavicular axillary and groin. The patient's breast without masses tenderness nor discharge. The patient does have a few sebaceous cyst on her labia majora but these are not draining and not tender. The patient's external genitalia are otherwise normal other than there is some mild atrophy of the labia minora but the introitus is normal as is the vagina other than loss of rugae. Rectovaginal exam was unremarkable other than some looseness of the anal ring and I have suggested to the patient that she start doing exercises to strengthen this before she starts having problems. Musculoskeletal:         General: Tenderness (Over the left hip joint and a positive Valencia Sick) present. No deformity. Cervical back: Normal range of motion and neck supple. Right lower leg: No edema. Left lower leg: No edema. Lymphadenopathy:      Cervical: No cervical adenopathy. Skin:     General: Skin is warm and dry. Coloration: Skin is not pale. Findings: No erythema or rash.    Neurological:      General: No focal deficit present. Mental Status: She is alert and oriented to person, place, and time. Cranial Nerves: No cranial nerve deficit. Motor: No abnormal muscle tone. Deep Tendon Reflexes: Reflexes are normal and symmetric. Reflexes normal.      Comments: Cranial nerves II through XII are intact sensory and motor. Biceps triceps knee and ankle DTRs are normal and symmetrical.   Psychiatric:         Mood and Affect: Mood normal.         Behavior: Behavior normal.         Thought Content: Thought content normal.         Judgment: Judgment normal.           ICD-10-CM ICD-9-CM    1. Gynecologic exam normal  Z01.419 V72.31    2. Hip pain, acute, left  M25.552 719.45 XR HIP LT W OR WO PELV 2-3 VWS   3. Fall, subsequent encounter  W19. XXXD V58.89 XR HIP LT W OR WO PELV 2-3 VWS     E888.9        Orders Placed This Encounter    XR HIP LT W OR WO PELV 2-3 VWS     Standing Status:   Future     Standing Expiration Date:   7/16/2022           ZEKE Neri MD

## 2021-06-17 ENCOUNTER — HOSPITAL ENCOUNTER (OUTPATIENT)
Dept: GENERAL RADIOLOGY | Age: 61
Discharge: HOME | End: 2021-06-17
Payer: MEDICARE

## 2021-06-17 ENCOUNTER — HOSPITAL ENCOUNTER (OUTPATIENT)
Dept: LAB | Age: 61
Discharge: HOME | End: 2021-06-17
Payer: MEDICARE

## 2021-06-17 DIAGNOSIS — M25.552 HIP PAIN, ACUTE, LEFT: ICD-10-CM
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
## 2021-06-17 PROCEDURE — 36415 COLL VENOUS BLD VENIPUNCTURE: CPT

## 2021-06-17 PROCEDURE — 82105 ALPHA-FETOPROTEIN SERUM: CPT

## 2021-06-17 PROCEDURE — 73502 X-RAY EXAM HIP UNI 2-3 VIEWS: CPT

## 2021-06-18 LAB — AFP-TM SERPL-MCNC: 2.4 NG/ML (ref 0–8.3)

## 2021-08-09 ENCOUNTER — OFFICE VISIT (OUTPATIENT)
Dept: FAMILY MEDICINE CLINIC | Age: 61
End: 2021-08-09
Payer: MEDICARE

## 2021-08-09 VITALS
OXYGEN SATURATION: 97 % | DIASTOLIC BLOOD PRESSURE: 78 MMHG | BODY MASS INDEX: 30.78 KG/M2 | HEIGHT: 62 IN | WEIGHT: 167.25 LBS | RESPIRATION RATE: 18 BRPM | TEMPERATURE: 98.1 F | HEART RATE: 78 BPM | SYSTOLIC BLOOD PRESSURE: 114 MMHG

## 2021-08-09 DIAGNOSIS — M62.838 MUSCLE SPASM: ICD-10-CM

## 2021-08-09 DIAGNOSIS — M54.6 ACUTE LEFT-SIDED THORACIC BACK PAIN: Primary | ICD-10-CM

## 2021-08-09 DIAGNOSIS — R07.89 CHEST PRESSURE: ICD-10-CM

## 2021-08-09 PROCEDURE — G8417 CALC BMI ABV UP PARAM F/U: HCPCS | Performed by: NURSE PRACTITIONER

## 2021-08-09 PROCEDURE — G8752 SYS BP LESS 140: HCPCS | Performed by: NURSE PRACTITIONER

## 2021-08-09 PROCEDURE — 99213 OFFICE O/P EST LOW 20 MIN: CPT | Performed by: NURSE PRACTITIONER

## 2021-08-09 PROCEDURE — 3017F COLORECTAL CA SCREEN DOC REV: CPT | Performed by: NURSE PRACTITIONER

## 2021-08-09 PROCEDURE — G8432 DEP SCR NOT DOC, RNG: HCPCS | Performed by: NURSE PRACTITIONER

## 2021-08-09 PROCEDURE — 93000 ELECTROCARDIOGRAM COMPLETE: CPT | Performed by: NURSE PRACTITIONER

## 2021-08-09 PROCEDURE — G8427 DOCREV CUR MEDS BY ELIG CLIN: HCPCS | Performed by: NURSE PRACTITIONER

## 2021-08-09 PROCEDURE — G8754 DIAS BP LESS 90: HCPCS | Performed by: NURSE PRACTITIONER

## 2021-08-09 PROCEDURE — G9899 SCRN MAM PERF RSLTS DOC: HCPCS | Performed by: NURSE PRACTITIONER

## 2021-08-09 RX ORDER — PREDNISONE 20 MG/1
TABLET ORAL
Qty: 30 TABLET | Refills: 0 | Status: SHIPPED | OUTPATIENT
Start: 2021-08-09 | End: 2021-09-28

## 2021-08-09 RX ORDER — METHOCARBAMOL 750 MG/1
750 TABLET, FILM COATED ORAL
Qty: 20 TABLET | Refills: 0 | Status: SHIPPED | OUTPATIENT
Start: 2021-08-09 | End: 2021-08-10

## 2021-08-09 NOTE — PROGRESS NOTES
Subjective:     Chief Complaint   Patient presents with    Shoulder Pain     left since friday        Anaya Alexandra is a 63 y.o. female who presents today with c/o acute severe left sided upper back pain that started 3 days ago. Denies any injury or overuse. Pain located under the left scapula and radiates around the bra line to the center of the chest. Described as a squeezing sensation. It is aggravated with movement and is so severe it takes her breath away and makes her want to cry. Relieved with rest and heating pad. Denies any palpitations, SOB, cough, wheezing, dizziness, nausea, or jaw pain. Denies any paresthesia or extremity weakness. She does have hx of chronic left shoulder pain and sees ortho but could not get in today. Takes Gabapentin and Flexeril daily but they are not helping. Patient Active Problem List   Diagnosis Code    Reactive airway disease J45.909    Smoker F17.200    JOHN (generalized anxiety disorder) F41.1    Gastroesophageal reflux disease without esophagitis K21.9    Hypertension, essential I10    Dyslipidemia E78.5    PTSD (post-traumatic stress disorder) F43.10    Chronic bilateral back pain M54.9, G89.29    Chronic pain of both shoulders M25.511, G89.29, M25.512       Past Medical History:   Diagnosis Date    Arthritis     Cancer ([facility] [address]     ovarian    Chronic HCV with state 2 hepatic fibrosis ([facility] [address]     Dyslipidemia     Gastroparesis     Lymph edema     Menopause     Precordial pain 1/30/2019    Psychiatric disorder     anxiety    Umbilical discharge 4/7/2290         Current Outpatient Medications:     ALPRAZolam (Xanax) 0.5 mg tablet, Take 1 Tablet by mouth three (3) times daily as needed for Anxiety.  Max Daily Amount: 1.5 mg., Disp: 90 Tablet, Rfl: 2    hydroCHLOROthiazide (HYDRODIURIL) 25 mg tablet, Take 1 tablet by mouth once daily, Disp: 90 Tab, Rfl: 0    cyclobenzaprine (FLEXERIL) 10 mg tablet, TAKE 1 TABLET BY MOUTH TWICE A DAY AS NEEDED, Disp: 60 Tab, Rfl: 1    carvediloL (COREG) 12.5 mg tablet, Take 1 pill BID, Disp: 180 Tab, Rfl: 1    citalopram (CELEXA) 20 mg tablet, Take 1 Tab by mouth daily. , Disp: 90 Tab, Rfl: 1    albuterol (PROVENTIL VENTOLIN) 2.5 mg /3 mL (0.083 %) nebu, 3 mL by Nebulization route every four (4) hours as needed for Wheezing., Disp: 24 Each, Rfl: 2    gabapentin (NEURONTIN) 300 mg capsule, Take 1 Cap by mouth three (3) times daily as needed for Pain. Max Daily Amount: 900 mg., Disp: 270 Cap, Rfl: 0    albuterol (PROVENTIL HFA, VENTOLIN HFA, PROAIR HFA) 90 mcg/actuation inhaler, Take 2 Puffs by inhalation every four (4) hours as needed for Wheezing., Disp: 1 Inhaler, Rfl: 5    Nebulizer & Compressor machine, Use q 4 hours as needed for dyspnea or wheezing, Disp: 1 Each, Rfl: 0    dronabinol (MARINOL) 5 mg capsule, Take 5 mg by mouth as needed. , Disp: , Rfl:     aspirin (ASPIRIN) 325 mg tablet, Take 325 mg by mouth daily. , Disp: , Rfl:     ondansetron (ZOFRAN ODT) 8 mg disintegrating tablet, Take 8 mg by mouth every eight (8) hours as needed for Nausea., Disp: , Rfl:     pantoprazole (PROTONIX) 40 mg tablet, Take 40 mg by mouth two (2) times a day., Disp: , Rfl:     nitroglycerin (NITROSTAT) 0.4 mg SL tablet, 0.4 mg by SubLINGual route every five (5) minutes as needed for Chest Pain. Up to 3 doses. , Disp: , Rfl:     Allergies   Allergen Reactions    Ampicillin Anaphylaxis    Codeine Anaphylaxis    Morphine Anaphylaxis    Penicillins Anaphylaxis       Past Surgical History:   Procedure Laterality Date    HX APPENDECTOMY      HX BREAST BIOPSY Left     benign    HX CHOLECYSTECTOMY      HX GYN      total hysterectomy    HX HYSTERECTOMY      HX OOPHORECTOMY         Social History     Tobacco Use   Smoking Status Current Every Day Smoker    Types: Cigarettes   Smokeless Tobacco Never Used   Tobacco Comment    5 cigs/day       Social History     Socioeconomic History    Marital status:      Spouse name: Not on file    Number of children: Not on file    Years of education: Not on file    Highest education level: Not on file   Tobacco Use    Smoking status: Current Every Day Smoker     Types: Cigarettes    Smokeless tobacco: Never Used    Tobacco comment: 5 cigs/day   Substance and Sexual Activity    Alcohol use: No    Drug use: No     Social Determinants of Health     Financial Resource Strain:     Difficulty of Paying Living Expenses:    Food Insecurity:     Worried About Running Out of Food in the Last Year:     [address] in the Last Year:    Transportation Needs:     Lack of Transportation (Medical):      Lack of Transportation (Non-Medical):    Physical Activity:     Days of Exercise per Week:     Minutes of Exercise per Session:    Stress:     Feeling of Stress :    Social Connections:     Frequency of Communication with Friends and Family:     Frequency of Social Gatherings with Friends and Family:     Attends [employer] Services:     Active Member of Clubs or Organizations:     Attends Club or Organization Meetings:     Marital Status:        Family History   Problem Relation Age of Onset    Breast Cancer Sister     No Known Problems Mother        ROS:  Gen: denies fever, chills, or fatigue  Resp: denies dyspnea, cough, or wheezing  CV: denies chest pain, +pressure, denies palpitations  Extremeties: denies edema  GI[de-identified] denies abdominal pain, nausea, vomiting, or diarrhea  Musculoskeletal: +acute severe left middle back pain radiating around to the left chest with muscle spasms  Neuro: denies numbness/tingling, upper extremity weakness, or dizziness  Skin: denies rashes or new lesions   Psych: denies anxiety, depression, angie, or other changes in mood    Objective:     Visit Vitals  /78 (BP 1 Location: Left upper arm)   Pulse 78   Temp 98.1 °F (36.7 °C) (Temporal)   Resp 18   Ht 5' 2\" (1.575 m)   Wt 167 lb 4 oz (75.9 kg)   SpO2 97%   BMI 30.59 kg/m²     Body mass index is 30.59 kg/m². General: Alert and oriented. No acute distress. Well nourished. HEENT :  Eyes: Sclera white, conjunctiva clear. PERRLA. Extra ocular movements intact. Neck: Supple with FROM. No TTP  Lungs: Breathing even and unlabored. All lobes clear to auscultation bilaterally   Heart :RRR, S1 and S2 normal intensity, no extra heart sounds  Extremities: Non-edematous  Abdomen: Soft and non-distended. Bowel sounds active. No tenderness to palpation, no masses. Back: FROM, no tenderness to palpation to the cervical or thoracic spine. Musculoskeletal: Left shoulder: No TTP, heat, erythema, or swelling. FROM but with pain   Neuro: Cranial nerves grossly normal.   Sensory: Sensation intact. Psych: Mood and thought content appropriate for situation. Dressed appropriately and with good hygiene. Skin: Warm, dry, and intact. No lesions or discoloration. Assessment/ Plan:     Acute left sided thoracic back pain with muscle spasms  Pain radiating to chest so we did an EKG that showed NSR without acute ST changes  EKG reviewed with Dr Yuridia Klein- no concerning findings  Start Prednisone 20mg- take 4 pills daily x 4 days then take 1 less pill each day until gone (#30,0R)  Cont Gabapentin 300mg TID prn pain- no refills needed at this time  Stop Flexeril due to ineffectiveness  Try Robaxin 750mg BID prn spasms - cautioned about drowsiness- no driving  Cont heating pad  RTO or go to ER for CP, SOB abdominal pain, vomiting, coughing, high fever, or other new concerning symptoms  F/U prn if symptoms worsen or do not improve, will get thoracic spinal xray (future order placed). Verbal and written instructions (see AVS) provided.  Patient expresses understanding of diagnosis and treatment plan.     Health Maintenance Due   Topic Date Due    DTaP/Tdap/Td series (1 - Tdap) Never done    Colorectal Cancer Screening Combo  Never done    Shingrix Vaccine Age 50> (1 of 2) Never done    Breast Cancer Screen Mammogram  10/30/2019 Frank Knutson.  Leotha Bosworth, NP

## 2021-08-09 NOTE — PROGRESS NOTES
1. Have you been to the ER, urgent care clinic since your last visit? Hospitalized since your last visit? No    2. Have you seen or consulted any other health care providers outside of the [address] [place] [address] since your last visit? Include any pap smears or colon screening.  No

## 2021-08-10 ENCOUNTER — TELEPHONE (OUTPATIENT)
Dept: FAMILY MEDICINE CLINIC | Age: 61
End: 2021-08-10

## 2021-08-10 RX ORDER — TIZANIDINE 4 MG/1
TABLET ORAL
Qty: 60 TABLET | Refills: 0 | Status: SHIPPED | OUTPATIENT
Start: 2021-08-10 | End: 2021-09-04

## 2021-08-10 NOTE — TELEPHONE ENCOUNTER
Alternative requested for Methocarbamol 750 MG Tab. Suggested alternatives are Chlorzoxazone 500 MG Tablet, Tizanidine HCL 4 MG Tab, Cyclobenzaprine 10 MG Tablet.

## 2021-09-03 ENCOUNTER — TELEPHONE (OUTPATIENT)
Dept: FAMILY MEDICINE CLINIC | Age: 61
End: 2021-09-03

## 2021-09-04 RX ORDER — TIZANIDINE 4 MG/1
TABLET ORAL
Qty: 60 TABLET | Refills: 0 | Status: SHIPPED | OUTPATIENT
Start: 2021-09-04 | End: 2021-09-08

## 2021-09-04 NOTE — TELEPHONE ENCOUNTER
I sent in a 30 day supply of tizanidine.  If she is still having pain she needs to get back in to see her orthopedist and he can give her future refills if appropriate

## 2021-09-08 DIAGNOSIS — M54.6 ACUTE LEFT-SIDED THORACIC BACK PAIN: Primary | ICD-10-CM

## 2021-09-08 RX ORDER — CYCLOBENZAPRINE HCL 10 MG
10 TABLET ORAL
Qty: 60 TABLET | Refills: 0 | Status: SHIPPED | OUTPATIENT
Start: 2021-09-08 | End: 2021-10-07

## 2021-09-28 ENCOUNTER — OFFICE VISIT (OUTPATIENT)
Dept: FAMILY MEDICINE CLINIC | Age: 61
End: 2021-09-28
Payer: MEDICARE

## 2021-09-28 VITALS
HEART RATE: 69 BPM | OXYGEN SATURATION: 93 % | SYSTOLIC BLOOD PRESSURE: 136 MMHG | DIASTOLIC BLOOD PRESSURE: 86 MMHG | BODY MASS INDEX: 31.1 KG/M2 | WEIGHT: 169 LBS | RESPIRATION RATE: 20 BRPM | TEMPERATURE: 98.4 F | HEIGHT: 62 IN

## 2021-09-28 DIAGNOSIS — E78.5 DYSLIPIDEMIA: ICD-10-CM

## 2021-09-28 DIAGNOSIS — K21.9 GASTROESOPHAGEAL REFLUX DISEASE WITHOUT ESOPHAGITIS: ICD-10-CM

## 2021-09-28 DIAGNOSIS — F41.1 GAD (GENERALIZED ANXIETY DISORDER): ICD-10-CM

## 2021-09-28 DIAGNOSIS — F17.200 SMOKER: ICD-10-CM

## 2021-09-28 DIAGNOSIS — I10 HYPERTENSION, ESSENTIAL: Primary | ICD-10-CM

## 2021-09-28 DIAGNOSIS — Z12.11 COLON CANCER SCREENING: ICD-10-CM

## 2021-09-28 DIAGNOSIS — F43.10 PTSD (POST-TRAUMATIC STRESS DISORDER): ICD-10-CM

## 2021-09-28 DIAGNOSIS — J45.20 MILD INTERMITTENT REACTIVE AIRWAY DISEASE WITHOUT COMPLICATION: ICD-10-CM

## 2021-09-28 PROCEDURE — 99214 OFFICE O/P EST MOD 30 MIN: CPT | Performed by: FAMILY MEDICINE

## 2021-09-28 PROCEDURE — G8754 DIAS BP LESS 90: HCPCS | Performed by: FAMILY MEDICINE

## 2021-09-28 PROCEDURE — G8510 SCR DEP NEG, NO PLAN REQD: HCPCS | Performed by: FAMILY MEDICINE

## 2021-09-28 PROCEDURE — G8752 SYS BP LESS 140: HCPCS | Performed by: FAMILY MEDICINE

## 2021-09-28 PROCEDURE — G8417 CALC BMI ABV UP PARAM F/U: HCPCS | Performed by: FAMILY MEDICINE

## 2021-09-28 PROCEDURE — 3017F COLORECTAL CA SCREEN DOC REV: CPT | Performed by: FAMILY MEDICINE

## 2021-09-28 PROCEDURE — G9899 SCRN MAM PERF RSLTS DOC: HCPCS | Performed by: FAMILY MEDICINE

## 2021-09-28 PROCEDURE — G8427 DOCREV CUR MEDS BY ELIG CLIN: HCPCS | Performed by: FAMILY MEDICINE

## 2021-09-28 RX ORDER — ALBUTEROL SULFATE 90 UG/1
2 AEROSOL, METERED RESPIRATORY (INHALATION)
Qty: 1 EACH | Refills: 5 | Status: SHIPPED | OUTPATIENT
Start: 2021-09-28 | End: 2022-10-18

## 2021-09-28 RX ORDER — ALPRAZOLAM 0.5 MG/1
0.5 TABLET ORAL
Qty: 90 TABLET | Refills: 2 | Status: SHIPPED | OUTPATIENT
Start: 2021-09-28 | End: 2022-01-20 | Stop reason: SDUPTHER

## 2021-09-28 NOTE — PROGRESS NOTES
Lisa Quinones is a 57 y.o. female who presents with the following:  Chief Complaint   Patient presents with    Asthma    Hypertension    GERD    Anxiety     PTSD    Cholesterol Problem       Patient states her asthma has been doing fairly well although she needs a refill on her albuterol HFA. Patient had a recent URTI and got through it without having any wheezing. Patient unfortunately is still smoking. Patient's blood pressure was up a little bit when she first came in but this came down as she had a time to sit. She has had no chest pain no shortness of breath and no edema. The patient's GERD is being managed with her PPI such that she is having no heartburn. The patient's anxiety and PTSD symptoms have been managed nicely with alprazolam taking half a milligram 3 times a day. The patient is found that since her son has been sober the past 5 months that her anxiety and many of the problems she was having are improving. Patient does have a cholesterol problem however as she is trying to work on this with weight management which has not been overly successful. The patient knows that she is due a mammogram but currently she is worried about the Covid crisis and is holding off on this until things settle out. Allergies   Allergen Reactions    Ampicillin Anaphylaxis    Codeine Anaphylaxis    Morphine Anaphylaxis    Penicillins Anaphylaxis       Current Outpatient Medications   Medication Sig    ALPRAZolam (Xanax) 0.5 mg tablet Take 1 Tablet by mouth three (3) times daily as needed for Anxiety. Max Daily Amount: 1.5 mg.    albuterol (PROVENTIL HFA, VENTOLIN HFA, PROAIR HFA) 90 mcg/actuation inhaler Take 2 Puffs by inhalation every four (4) hours as needed for Wheezing.  cyclobenzaprine (FLEXERIL) 10 mg tablet Take 1 Tablet by mouth two (2) times daily as needed for Muscle Spasm(s).     hydroCHLOROthiazide (HYDRODIURIL) 25 mg tablet Take 1 tablet by mouth once daily    carvediloL (COREG) 12.5 mg tablet Take 1 pill BID    albuterol (PROVENTIL VENTOLIN) 2.5 mg /3 mL (0.083 %) nebu 3 mL by Nebulization route every four (4) hours as needed for Wheezing.  gabapentin (NEURONTIN) 300 mg capsule Take 1 Cap by mouth three (3) times daily as needed for Pain. Max Daily Amount: 900 mg.    Nebulizer & Compressor machine Use q 4 hours as needed for dyspnea or wheezing    dronabinol (MARINOL) 5 mg capsule Take 5 mg by mouth as needed.  aspirin (ASPIRIN) 325 mg tablet Take 325 mg by mouth daily.  ondansetron (ZOFRAN ODT) 8 mg disintegrating tablet Take 8 mg by mouth every eight (8) hours as needed for Nausea.  pantoprazole (PROTONIX) 40 mg tablet Take 40 mg by mouth two (2) times a day.  nitroglycerin (NITROSTAT) 0.4 mg SL tablet 0.4 mg by SubLINGual route every five (5) minutes as needed for Chest Pain. Up to 3 doses. No current facility-administered medications for this visit.        Past Medical History:   Diagnosis Date    Arthritis     Cancer ([facility] [address]     ovarian    Chronic HCV with state 2 hepatic fibrosis (HCC)     Dyslipidemia     Gastroparesis     Lymph edema     Menopause     Precordial pain 1/30/2019    Psychiatric disorder     anxiety    Umbilical discharge 2/0/4483       Past Surgical History:   Procedure Laterality Date    HX APPENDECTOMY      HX BREAST BIOPSY Left     benign    HX CHOLECYSTECTOMY      HX GYN      total hysterectomy    HX HYSTERECTOMY      HX OOPHORECTOMY         Family History   Problem Relation Age of Onset    Breast Cancer Sister     No Known Problems Mother        Social History     Socioeconomic History    Marital status:      Spouse name: Not on file    Number of children: Not on file    Years of education: Not on file    Highest education level: Not on file   Tobacco Use    Smoking status: Current Every Day Smoker     Types: Cigarettes    Smokeless tobacco: Never Used    Tobacco comment: 5 cigs/day   Substance and Sexual Activity    Alcohol use: No    Drug use: No     Social Determinants of Health     Financial Resource Strain:     Difficulty of Paying Living Expenses:    Food Insecurity:     Worried About Running Out of Food in the Last Year:     [address] in the Last Year:    Transportation Needs:     Lack of Transportation (Medical):  Lack of Transportation (Non-Medical):    Physical Activity:     Days of Exercise per Week:     Minutes of Exercise per Session:    Stress:     Feeling of Stress :    Social Connections:     Frequency of Communication with Friends and Family:     Frequency of Social Gatherings with Friends and Family:     Attends Jew Services:     Active Member of Clubs or Organizations:     Attends Club or Organization Meetings:     Marital Status:        Review of Systems   Constitutional: Negative for chills, fever, malaise/fatigue and weight loss. HENT: Negative for congestion, hearing loss, sore throat and tinnitus. Eyes: Negative for blurred vision, pain and discharge. Respiratory: Negative for cough, shortness of breath and wheezing. Cardiovascular: Negative for chest pain, palpitations, orthopnea, claudication and leg swelling. Gastrointestinal: Negative for abdominal pain, constipation and heartburn. Genitourinary: Negative for dysuria, frequency and urgency. Musculoskeletal: Negative for falls, joint pain and myalgias. Skin: Negative for itching and rash. Neurological: Negative for dizziness, tingling, tremors and headaches. Endo/Heme/Allergies: Negative for environmental allergies and polydipsia. Psychiatric/Behavioral: Negative for depression and substance abuse. The patient is not nervous/anxious. Visit Vitals  /86   Pulse 69   Temp 98.4 °F (36.9 °C)   Resp 20   Ht 5' 2\" (1.575 m)   Wt 169 lb (76.7 kg)   SpO2 93%   BMI 30.91 kg/m²     Physical Exam  Vitals reviewed. Constitutional:       General: She is not in acute distress. Appearance: Normal appearance. She is obese. She is not ill-appearing. HENT:      Head: Normocephalic and atraumatic. Right Ear: Tympanic membrane, ear canal and external ear normal.      Left Ear: Tympanic membrane, ear canal and external ear normal.      Nose: Nose normal. No congestion or rhinorrhea. Mouth/Throat:      Mouth: Mucous membranes are moist.      Pharynx: No posterior oropharyngeal erythema. Eyes:      General:         Right eye: No discharge. Left eye: No discharge. Extraocular Movements: Extraocular movements intact. Conjunctiva/sclera: Conjunctivae normal.      Pupils: Pupils are equal, round, and reactive to light. Comments: Cornea anterior chamber and iris are normal.   Neck:      Trachea: No tracheal deviation. Cardiovascular:      Rate and Rhythm: Normal rate and regular rhythm. Pulses: Normal pulses. Heart sounds: Normal heart sounds. No murmur heard. No friction rub. No gallop. Pulmonary:      Effort: Pulmonary effort is normal. No respiratory distress. Breath sounds: Normal breath sounds. No wheezing or rhonchi. Chest:      Chest wall: No tenderness. Abdominal:      General: Bowel sounds are normal. There is no distension. Palpations: Abdomen is soft. There is no mass. Tenderness: There is no abdominal tenderness. There is no guarding or rebound. Musculoskeletal:         General: No tenderness or deformity. Cervical back: Normal range of motion and neck supple. Right lower leg: No edema. Left lower leg: No edema. Lymphadenopathy:      Cervical: No cervical adenopathy. Skin:     General: Skin is warm and dry. Coloration: Skin is not pale. Findings: No erythema or rash. Neurological:      General: No focal deficit present. Mental Status: She is alert and oriented to person, place, and time. Cranial Nerves: No cranial nerve deficit. Motor: No abnormal muscle tone.       Deep Tendon Reflexes: Reflexes are normal and symmetric. Reflexes normal.      Comments: Cranial nerves II through XII are intact sensory and motor. Biceps triceps knee and ankle DTRs are normal and symmetrical.   Psychiatric:         Mood and Affect: Mood normal.         Behavior: Behavior normal.         Thought Content: Thought content normal.         Judgment: Judgment normal.           ICD-10-CM ICD-9-CM    1. Hypertension, essential  I10 401.9    2. Gastroesophageal reflux disease without esophagitis  K21.9 530.81    3. Mild intermittent reactive airway disease without complication  V14.10 604.74 albuterol (PROVENTIL HFA, VENTOLIN HFA, PROAIR HFA) 90 mcg/actuation inhaler   4. Smoker  F17.200 305.1    5. PTSD (post-traumatic stress disorder)  F43.10 309.81 ALPRAZolam (Xanax) 0.5 mg tablet   6. JOHN (generalized anxiety disorder)  F41.1 300.02 ALPRAZolam (Xanax) 0.5 mg tablet   7. Dyslipidemia  E78.5 272.4    8. Colon cancer screening  Z12.11 V76.51 OCCULT BLOOD IMMUNOASSAY,DIAGNOSTIC      OCCULT BLOOD IMMUNOASSAY,DIAGNOSTIC       Orders Placed This Encounter    OCCULT BLOOD IMMUNOASSAY,DIAGNOSTIC     Standing Status:   Future     Number of Occurrences:   1     Standing Expiration Date:   9/28/2022     Order Specific Question:   QUEST SOURCE     Answer:   Stool [1161]    ALPRAZolam (Xanax) 0.5 mg tablet     Sig: Take 1 Tablet by mouth three (3) times daily as needed for Anxiety. Max Daily Amount: 1.5 mg.     Dispense:  90 Tablet     Refill:  2    albuterol (PROVENTIL HFA, VENTOLIN HFA, PROAIR HFA) 90 mcg/actuation inhaler     Sig: Take 2 Puffs by inhalation every four (4) hours as needed for Wheezing. Dispense:  1 Each     Refill:  5       Follow-up and Dispositions    · Return in about 3 months (around 12/28/2021), or if symptoms worsen or fail to improve.          Hollie Thorpe MD

## 2021-09-28 NOTE — PROGRESS NOTES
1. Have you been to the ER, urgent care clinic since your last visit? Hospitalized since your last visit?no    2. Have you seen or consulted any other health care providers outside of the [address] [place] [address] since your last visit? Include any pap smears or colon screening.  no

## 2021-10-05 DIAGNOSIS — M54.6 ACUTE LEFT-SIDED THORACIC BACK PAIN: ICD-10-CM

## 2021-10-07 RX ORDER — CYCLOBENZAPRINE HCL 10 MG
10 TABLET ORAL
Qty: 60 TABLET | Refills: 0 | Status: SHIPPED | OUTPATIENT
Start: 2021-10-07 | End: 2021-12-08

## 2021-12-08 DIAGNOSIS — M54.6 ACUTE LEFT-SIDED THORACIC BACK PAIN: ICD-10-CM

## 2021-12-08 RX ORDER — CYCLOBENZAPRINE HCL 10 MG
TABLET ORAL
Qty: 60 TABLET | Refills: 0 | Status: SHIPPED | OUTPATIENT
Start: 2021-12-08 | End: 2022-02-07

## 2022-01-20 ENCOUNTER — OFFICE VISIT (OUTPATIENT)
Dept: FAMILY MEDICINE CLINIC | Age: 62
End: 2022-01-20
Payer: MEDICARE

## 2022-01-20 VITALS
BODY MASS INDEX: 31.01 KG/M2 | DIASTOLIC BLOOD PRESSURE: 77 MMHG | HEART RATE: 76 BPM | HEIGHT: 62 IN | TEMPERATURE: 97.8 F | OXYGEN SATURATION: 94 % | SYSTOLIC BLOOD PRESSURE: 116 MMHG | WEIGHT: 168.5 LBS | RESPIRATION RATE: 18 BRPM

## 2022-01-20 DIAGNOSIS — Z00.00 MEDICARE ANNUAL WELLNESS VISIT, SUBSEQUENT: Primary | ICD-10-CM

## 2022-01-20 DIAGNOSIS — Z79.899 CONTROLLED SUBSTANCE AGREEMENT SIGNED: ICD-10-CM

## 2022-01-20 DIAGNOSIS — F43.10 PTSD (POST-TRAUMATIC STRESS DISORDER): ICD-10-CM

## 2022-01-20 DIAGNOSIS — F41.1 GAD (GENERALIZED ANXIETY DISORDER): ICD-10-CM

## 2022-01-20 PROCEDURE — G0439 PPPS, SUBSEQ VISIT: HCPCS | Performed by: FAMILY MEDICINE

## 2022-01-20 PROCEDURE — 99213 OFFICE O/P EST LOW 20 MIN: CPT | Performed by: FAMILY MEDICINE

## 2022-01-20 RX ORDER — ALPRAZOLAM 0.5 MG/1
0.5 TABLET ORAL
Qty: 90 TABLET | Refills: 2 | Status: SHIPPED | OUTPATIENT
Start: 2022-01-20 | End: 2022-04-20 | Stop reason: SDUPTHER

## 2022-01-20 NOTE — PATIENT INSTRUCTIONS
Medicare Wellness Visit, Female     The best way to live healthy is to have a lifestyle where you eat a well-balanced diet, exercise regularly, limit alcohol use, and quit all forms of tobacco/nicotine, if applicable. Regular preventive services are another way to keep healthy. Preventive services (vaccines, screening tests, monitoring & exams) can help personalize your care plan, which helps you manage your own care. Screening tests can find health problems at the earliest stages, when they are easiest to treat. [facility] follows the current, evidence-based guidelines published by the [facility] ([facility]) when recommending preventive services for our patients. Because we follow these guidelines, sometimes recommendations change over time as research supports it. (For example, mammograms used to be recommended annually. Even though Medicare will still pay for an annual mammogram, the newer guidelines recommend a mammogram every two years for women of average risk). Of course, you and your doctor may decide to screen more often for some diseases, based on your risk and your co-morbidities (chronic disease you are already diagnosed with). Preventive services for you include:  - Medicare offers their members a free annual wellness visit, which is time for you and your primary care provider to discuss and plan for your preventive service needs. Take advantage of this benefit every year!  -All adults over the age of 72 should receive the recommended pneumonia vaccines. Current USPSTF guidelines recommend a series of two vaccines for the best pneumonia protection.   -All adults should have a flu vaccine yearly and a tetanus vaccine every 10 years.   -All adults age 48 and older should receive the shingles vaccines (series of two vaccines).       -All adults age 38-68 who are overweight should have a diabetes screening test once every three years.   -All adults born between [birth year] and 1966 should be screened once for Hepatitis C.  -Other screening tests and preventive services for persons with diabetes include: an eye exam to screen for diabetic retinopathy, a kidney function test, a foot exam, and stricter control over your cholesterol.   -Cardiovascular screening for adults with routine risk involves an electrocardiogram (ECG) at intervals determined by your doctor.   -Colorectal cancer screenings should be done for adults age 54-65 with no increased risk factors for colorectal cancer. There are a number of acceptable methods of screening for this type of cancer. Each test has its own benefits and drawbacks. Discuss with your doctor what is most appropriate for you during your annual wellness visit. The different tests include: colonoscopy (considered the best screening method), a fecal occult blood test, a fecal DNA test, and sigmoidoscopy.    -A bone mass density test is recommended when a woman turns 65 to screen for osteoporosis. This test is only recommended one time, as a screening. Some providers will use this same test as a disease monitoring tool if you already have osteoporosis. -Breast cancer screenings are recommended every other year for women of normal risk, age 54-69.  -Cervical cancer screenings for women over age 72 are only recommended with certain risk factors.      Here is a list of your current Health Maintenance items (your personalized list of preventive services) with a due date:  Health Maintenance Due   Topic Date Due    DTaP/Tdap/Td  (1 - Tdap) Never done    Cervical cancer screen  Never done    Colorectal Screening  Never done    Shingles Vaccine (1 of 2) Never done    Mammogram  10/30/2019    Yearly Flu Vaccine (1) 09/01/2021    COVID-19 Vaccine (3 - Booster for Moderna series) 12/16/2021

## 2022-01-20 NOTE — PROGRESS NOTES
1. Have you been to the ER, urgent care clinic since your last visit? Hospitalized since your last visit? No    2. Have you seen or consulted any other health care providers outside of the [address] [place] [address] since your last visit? Include any pap smears or colon screening. Ruth Vasques is a 57 y.o. female who presents with the following:  Chief Complaint   Patient presents with    Anxiety     med refill    Annual Wellness Visit       Patient states she is doing quite well on her alprazolam 3 times daily which is controlling her anxiety's and her PTSD symptoms. The patient is showing no aberrant behavior nor any signs of diversion. Allergies   Allergen Reactions    Ampicillin Anaphylaxis    Codeine Anaphylaxis    Morphine Anaphylaxis    Penicillins Anaphylaxis       Current Outpatient Medications   Medication Sig    ALPRAZolam (Xanax) 0.5 mg tablet Take 1 Tablet by mouth three (3) times daily as needed for Anxiety. Max Daily Amount: 1.5 mg.    hydroCHLOROthiazide (HYDRODIURIL) 25 mg tablet Take 1 Tablet by mouth daily.  cyclobenzaprine (FLEXERIL) 10 mg tablet TAKE 1 TABLET BY MOUTH 2 TIMES DAILY AS NEEDED FOR MUSCLE SPASM(S).  carvediloL (COREG) 12.5 mg tablet TAKE 1 TABLET BY MOUTH TWICE A DAY    albuterol (PROVENTIL HFA, VENTOLIN HFA, PROAIR HFA) 90 mcg/actuation inhaler Take 2 Puffs by inhalation every four (4) hours as needed for Wheezing.  albuterol (PROVENTIL VENTOLIN) 2.5 mg /3 mL (0.083 %) nebu 3 mL by Nebulization route every four (4) hours as needed for Wheezing.  gabapentin (NEURONTIN) 300 mg capsule Take 1 Cap by mouth three (3) times daily as needed for Pain. Max Daily Amount: 900 mg.    Nebulizer & Compressor machine Use q 4 hours as needed for dyspnea or wheezing    dronabinol (MARINOL) 5 mg capsule Take 5 mg by mouth as needed.  aspirin (ASPIRIN) 325 mg tablet Take 325 mg by mouth daily.     ondansetron (ZOFRAN ODT) 8 mg disintegrating tablet Take 8 mg by mouth every eight (8) hours as needed for Nausea.  pantoprazole (PROTONIX) 40 mg tablet Take 40 mg by mouth two (2) times a day.  nitroglycerin (NITROSTAT) 0.4 mg SL tablet 0.4 mg by SubLINGual route every five (5) minutes as needed for Chest Pain. Up to 3 doses. No current facility-administered medications for this visit. Past Medical History:   Diagnosis Date    Arthritis     Cancer ([facility] [address]     ovarian    Chronic HCV with state 2 hepatic fibrosis (HCC)     Dyslipidemia     Gastroparesis     Lymph edema     Menopause     Precordial pain 1/30/2019    Psychiatric disorder     anxiety    Umbilical discharge 5/6/1494       Past Surgical History:   Procedure Laterality Date    HX APPENDECTOMY      HX BREAST BIOPSY Left     benign    HX CHOLECYSTECTOMY      HX GYN      total hysterectomy    HX HYSTERECTOMY      HX OOPHORECTOMY         Family History   Problem Relation Age of Onset    Breast Cancer Sister     No Known Problems Mother        Social History     Socioeconomic History    Marital status:    Tobacco Use    Smoking status: Current Every Day Smoker     Types: Cigarettes    Smokeless tobacco: Never Used    Tobacco comment: 5 cigs/day   Substance and Sexual Activity    Alcohol use: No    Drug use: No       Review of Systems   Constitutional: Negative for chills, fever, malaise/fatigue and weight loss. HENT: Negative for congestion, hearing loss, sore throat and tinnitus. Eyes: Negative for blurred vision, pain and discharge. Respiratory: Negative for cough, shortness of breath and wheezing. Cardiovascular: Negative for chest pain, palpitations, orthopnea, claudication and leg swelling. Gastrointestinal: Negative for abdominal pain, constipation and heartburn. Genitourinary: Negative for dysuria, frequency and urgency. Patient is having some dribbling. Musculoskeletal: Negative for falls, joint pain and myalgias.    Skin: Negative for itching and rash.   Neurological: Negative for dizziness, tingling, tremors and headaches. Endo/Heme/Allergies: Negative for environmental allergies and polydipsia. Psychiatric/Behavioral: Negative for depression and substance abuse. The patient is not nervous/anxious. Visit Vitals  /77 (BP 1 Location: Left arm, BP Patient Position: Sitting)   Pulse 76   Temp 97.8 °F (36.6 °C) (Temporal)   Resp 18   Ht 5' 2\" (1.575 m)   Wt 168 lb 8 oz (76.4 kg)   SpO2 94%   BMI 30.82 kg/m²     Physical Exam  Vitals reviewed. Constitutional:       General: She is not in acute distress. Appearance: Normal appearance. She is obese. She is not ill-appearing. HENT:      Head: Normocephalic and atraumatic. Right Ear: Tympanic membrane, ear canal and external ear normal.      Left Ear: Tympanic membrane, ear canal and external ear normal.      Nose: Nose normal. No congestion or rhinorrhea. Mouth/Throat:      Mouth: Mucous membranes are moist.      Pharynx: No posterior oropharyngeal erythema. Eyes:      General:         Right eye: No discharge. Left eye: No discharge. Extraocular Movements: Extraocular movements intact. Conjunctiva/sclera: Conjunctivae normal.      Pupils: Pupils are equal, round, and reactive to light. Comments: Cornea anterior chamber and iris are normal.   Neck:      Trachea: No tracheal deviation. Cardiovascular:      Rate and Rhythm: Normal rate and regular rhythm. Pulses: Normal pulses. Heart sounds: Normal heart sounds. No murmur heard. No friction rub. No gallop. Pulmonary:      Effort: Pulmonary effort is normal. No respiratory distress. Breath sounds: Normal breath sounds. No wheezing or rhonchi. Chest:      Chest wall: No tenderness. Abdominal:      General: Bowel sounds are normal. There is no distension. Palpations: Abdomen is soft. There is no mass. Tenderness: There is no abdominal tenderness. There is no guarding or rebound. Musculoskeletal:         General: No tenderness or deformity. Normal range of motion. Cervical back: Normal range of motion and neck supple. Right lower leg: No edema. Left lower leg: No edema. Lymphadenopathy:      Cervical: No cervical adenopathy. Skin:     General: Skin is warm and dry. Coloration: Skin is not pale. Findings: No erythema or rash. Neurological:      General: No focal deficit present. Mental Status: She is alert and oriented to person, place, and time. Cranial Nerves: No cranial nerve deficit. Motor: No abnormal muscle tone. Deep Tendon Reflexes: Reflexes are normal and symmetric. Reflexes normal.      Comments: Cranial nerves II through XII are intact sensory and motor. Biceps triceps knee and ankle DTRs are normal and symmetrical.   Psychiatric:         Mood and Affect: Mood normal.         Behavior: Behavior normal.         Thought Content: Thought content normal.         Judgment: Judgment normal.           ICD-10-CM ICD-9-CM    1. Medicare annual wellness visit, subsequent  Z00.00 V70.0    2. JOHN (generalized anxiety disorder)  F41.1 300.02 ALPRAZolam (Xanax) 0.5 mg tablet   3. PTSD (post-traumatic stress disorder)  F43.10 309.81 ALPRAZolam (Xanax) 0.5 mg tablet       Orders Placed This Encounter    ALPRAZolam (Xanax) 0.5 mg tablet     Sig: Take 1 Tablet by mouth three (3) times daily as needed for Anxiety. Max Daily Amount: 1.5 mg.     Dispense:  90 Tablet     Refill:  2       Follow-up and Dispositions    · Return in about 3 months (around 4/20/2022).          Mariel Lazar MD          Chief Complaint   Patient presents with    Anxiety     med refill    Annual Wellness Visit     Visit Vitals  /77 (BP 1 Location: Left arm, BP Patient Position: Sitting)   Pulse 76   Temp 97.8 °F (36.6 °C) (Temporal)   Resp 18   Ht 5' 2\" (1.575 m)   Wt 168 lb 8 oz (76.4 kg)   SpO2 94%   BMI 30.82 kg/m²       This is the Subsequent Medicare Annual Wellness Exam, performed 12 months or more after the Initial AWV or the last Subsequent AWV    I have reviewed the patient's medical history in detail and updated the computerized patient record. Assessment/Plan   Education and counseling provided:  Are appropriate based on today's review and evaluation    1. Medicare annual wellness visit, subsequent  2. JOHN (generalized anxiety disorder)  -     ALPRAZolam (Xanax) 0.5 mg tablet; Take 1 Tablet by mouth three (3) times daily as needed for Anxiety. Max Daily Amount: 1.5 mg., Normal, Disp-90 Tablet, R-2  3. PTSD (post-traumatic stress disorder)  -     ALPRAZolam (Xanax) 0.5 mg tablet; Take 1 Tablet by mouth three (3) times daily as needed for Anxiety. Max Daily Amount: 1.5 mg., Normal, Disp-90 Tablet, R-2       Depression Risk Factor Screening     3 most recent PHQ Screens 1/20/2022   Little interest or pleasure in doing things Several days   Feeling down, depressed, irritable, or hopeless Several days   Total Score PHQ 2 2       Alcohol & Drug Abuse Risk Screen    Do you average more than 1 drink per night or more than 7 drinks a week:  No    On any one occasion in the past three months have you have had more than 3 drinks containing alcohol:  No          Functional Ability and Level of Safety    Hearing: Hearing is good. Activities of Daily Living: The home contains: grab bars  Patient does total self care      Ambulation: with no difficulty     Fall Risk:  Fall Risk Assessment, last 12 mths 2/10/2021   Able to walk? Yes   Fall in past 12 months? 0   Do you feel unsteady? 0   Are you worried about falling 0   Number of falls in past 12 months -   Fall with injury? -      Abuse Screen:  Patient is not abused       Functional Ability:   Does the patient exhibit a steady gait? yes   How long did it take the patient to get up and walk from a sitting position?  2 sec   Is the patient self reliant?  (ie can do own laundry, meals, household chores)  yes     Does the patient handle his/her own medications? yes     Does the patient handle his/her own money? yes     Is the patients home safe (ie good lighting, handrails on stairs and bath, etc.)? yes     Did you notice or did patient express any hearing difficulties? no     Did you notice or did patient express any vision difficulties?   no     Were distance and reading eye charts used? no       Advance Care Planning:   Patient was offered the opportunity to discuss advance care planning:  yes     Does patient have an Advance Directive:  no   If no, did you provide information on Caring Connections? yes     ADL Assessment 1/20/2022   Feeding yourself No Help Needed   Getting from bed to chair No Help Needed   Getting dressed No Help Needed   Bathing or showering No Help Needed   Walk across the room (includes cane/walker) No Help Needed   Using the telphone No Help Needed   Taking your medications No Help Needed   Preparing meals No Help Needed   Managing money (expenses/bills) No Help Needed   Moderately strenuous housework (laundry) No Help Needed   Shopping for personal items (toiletries/medicines) No Help Needed   Shopping for groceries No Help Needed   Driving No Help Needed   Climbing a flight of stairs No Help Needed   Getting to places beyond walking distances No Help Needed       Abuse Screening Questionnaire 1/20/2022   Do you ever feel afraid of your partner? N   Are you in a relationship with someone who physically or mentally threatens you? N   Is it safe for you to go home?  Y       Cognitive Screening    Has your family/caregiver stated any concerns about your memory: no     Cognitive Screening: Normal - Clock Drawing Test    Health Maintenance Due     Health Maintenance Due   Topic Date Due    DTaP/Tdap/Td series (1 - Tdap) Never done    Cervical cancer screen  Never done    Colorectal Cancer Screening Combo  Never done    Shingrix Vaccine Age 50> (1 of 2) Never done    Breast Cancer Screen Mammogram  10/30/2019    Flu Vaccine (1) 09/01/2021    COVID-19 Vaccine (3 - Booster for Moderna series) 12/16/2021       Patient Care Team   Patient Care Team:  Faisal Modi MD as PCP - General (Family Medicine)  Faisal Modi MD as PCP - [facility]  Del Orr MD (Cardiology)    History     Patient Active Problem List   Diagnosis Code    Reactive airway disease J45.909    Smoker F17.200    JOHN (generalized anxiety disorder) F41.1    Gastroesophageal reflux disease without esophagitis K21.9    Hypertension, essential I10    Dyslipidemia E78.5    PTSD (post-traumatic stress disorder) F43.10    Chronic bilateral back pain M54.9, G89.29    Chronic pain of both shoulders M25.511, G89.29, M25.512     Past Medical History:   Diagnosis Date    Arthritis     Cancer ([facility] [address]     ovarian    Chronic HCV with state 2 hepatic fibrosis ([facility] [address]     Dyslipidemia     Gastroparesis     Lymph edema     Menopause     Precordial pain 1/30/2019    Psychiatric disorder     anxiety    Umbilical discharge 4/8/0443      Past Surgical History:   Procedure Laterality Date    HX APPENDECTOMY      HX BREAST BIOPSY Left     benign    HX CHOLECYSTECTOMY      HX GYN      total hysterectomy    HX HYSTERECTOMY      HX OOPHORECTOMY       Current Outpatient Medications   Medication Sig Dispense Refill    ALPRAZolam (Xanax) 0.5 mg tablet Take 1 Tablet by mouth three (3) times daily as needed for Anxiety. Max Daily Amount: 1.5 mg. 90 Tablet 2    hydroCHLOROthiazide (HYDRODIURIL) 25 mg tablet Take 1 Tablet by mouth daily. 90 Tablet 0    cyclobenzaprine (FLEXERIL) 10 mg tablet TAKE 1 TABLET BY MOUTH 2 TIMES DAILY AS NEEDED FOR MUSCLE SPASM(S). 60 Tablet 0    carvediloL (COREG) 12.5 mg tablet TAKE 1 TABLET BY MOUTH TWICE A  Tablet 1    albuterol (PROVENTIL HFA, VENTOLIN HFA, PROAIR HFA) 90 mcg/actuation inhaler Take 2 Puffs by inhalation every four (4) hours as needed for Wheezing.  1 Each 5    albuterol (PROVENTIL VENTOLIN) 2.5 mg /3 mL (0.083 %) nebu 3 mL by Nebulization route every four (4) hours as needed for Wheezing. 24 Each 2    gabapentin (NEURONTIN) 300 mg capsule Take 1 Cap by mouth three (3) times daily as needed for Pain. Max Daily Amount: 900 mg. 270 Cap 0    Nebulizer & Compressor machine Use q 4 hours as needed for dyspnea or wheezing 1 Each 0    dronabinol (MARINOL) 5 mg capsule Take 5 mg by mouth as needed.  aspirin (ASPIRIN) 325 mg tablet Take 325 mg by mouth daily.  ondansetron (ZOFRAN ODT) 8 mg disintegrating tablet Take 8 mg by mouth every eight (8) hours as needed for Nausea.  pantoprazole (PROTONIX) 40 mg tablet Take 40 mg by mouth two (2) times a day.  nitroglycerin (NITROSTAT) 0.4 mg SL tablet 0.4 mg by SubLINGual route every five (5) minutes as needed for Chest Pain. Up to 3 doses.        Allergies   Allergen Reactions    Ampicillin Anaphylaxis    Codeine Anaphylaxis    Morphine Anaphylaxis    Penicillins Anaphylaxis       Family History   Problem Relation Age of Onset    Breast Cancer Sister     No Known Problems Mother      Social History     Tobacco Use    Smoking status: Current Every Day Smoker     Types: Cigarettes    Smokeless tobacco: Never Used    Tobacco comment: 5 cigs/day   Substance Use Topics    Alcohol use: No         BRYON Escobar MD

## 2022-01-27 LAB — DRUGS UR: NORMAL

## 2022-01-28 NOTE — PROGRESS NOTES
Per Dr. Haider Scales  Please inform the patient if she wishes to smoke or eat marijuana then I will no longer prescribe any controlled substance for her.

## 2022-02-01 NOTE — PROGRESS NOTES
Pt takes Roddie Pickler which is prescribed by her gastro dr for her nausea, she doesn't take it often but had before she did this test, she does not do any marijuana.

## 2022-02-04 DIAGNOSIS — M54.6 ACUTE LEFT-SIDED THORACIC BACK PAIN: ICD-10-CM

## 2022-02-07 RX ORDER — CYCLOBENZAPRINE HCL 10 MG
TABLET ORAL
Qty: 60 TABLET | Refills: 0 | Status: SHIPPED | OUTPATIENT
Start: 2022-02-07 | End: 2022-03-24

## 2022-03-18 PROBLEM — J45.909 REACTIVE AIRWAY DISEASE: Status: ACTIVE | Noted: 2018-09-19

## 2022-03-18 PROBLEM — F17.200 SMOKER: Status: ACTIVE | Noted: 2018-09-19

## 2022-03-18 PROBLEM — E78.5 DYSLIPIDEMIA: Status: ACTIVE | Noted: 2019-01-30

## 2022-03-19 PROBLEM — M25.511 CHRONIC PAIN OF BOTH SHOULDERS: Status: ACTIVE | Noted: 2021-05-11

## 2022-03-19 PROBLEM — M54.9 CHRONIC BILATERAL BACK PAIN: Status: ACTIVE | Noted: 2020-03-18

## 2022-03-19 PROBLEM — I10 HYPERTENSION, ESSENTIAL: Status: ACTIVE | Noted: 2018-09-19

## 2022-03-19 PROBLEM — G89.29 CHRONIC BILATERAL BACK PAIN: Status: ACTIVE | Noted: 2020-03-18

## 2022-03-19 PROBLEM — G89.29 CHRONIC PAIN OF BOTH SHOULDERS: Status: ACTIVE | Noted: 2021-05-11

## 2022-03-19 PROBLEM — F43.10 PTSD (POST-TRAUMATIC STRESS DISORDER): Status: ACTIVE | Noted: 2020-03-18

## 2022-03-19 PROBLEM — M25.512 CHRONIC PAIN OF BOTH SHOULDERS: Status: ACTIVE | Noted: 2021-05-11

## 2022-03-20 PROBLEM — F41.1 GAD (GENERALIZED ANXIETY DISORDER): Status: ACTIVE | Noted: 2018-09-19

## 2022-03-20 PROBLEM — K21.9 GASTROESOPHAGEAL REFLUX DISEASE WITHOUT ESOPHAGITIS: Status: ACTIVE | Noted: 2018-09-19

## 2022-03-21 ENCOUNTER — VIRTUAL VISIT (OUTPATIENT)
Dept: FAMILY MEDICINE CLINIC | Age: 62
End: 2022-03-21
Payer: MEDICARE

## 2022-03-21 DIAGNOSIS — R19.7 DIARRHEA, UNSPECIFIED TYPE: ICD-10-CM

## 2022-03-21 DIAGNOSIS — R53.81 MALAISE: ICD-10-CM

## 2022-03-21 DIAGNOSIS — R50.9 FEVER, UNSPECIFIED FEVER CAUSE: Primary | ICD-10-CM

## 2022-03-21 PROCEDURE — 87804 INFLUENZA ASSAY W/OPTIC: CPT | Performed by: NURSE PRACTITIONER

## 2022-03-21 PROCEDURE — 99213 OFFICE O/P EST LOW 20 MIN: CPT | Performed by: NURSE PRACTITIONER

## 2022-03-21 PROCEDURE — 87635 SARS-COV-2 COVID-19 AMP PRB: CPT | Performed by: NURSE PRACTITIONER

## 2022-03-21 NOTE — PROGRESS NOTES
1. \"Have you been to the ER, urgent care clinic since your last visit? Hospitalized since your last visit? \" No    2. \"Have you seen or consulted any other health care providers outside of the [address] [place] [address] since your last visit? \" No     3. For patients aged 39-70: Has the patient had a colonoscopy / FIT/ Cologuard? Yes - Care Gap present. Most recent result on file      If the patient is female:    4. For patients aged 41-77: Has the patient had a mammogram within the past 2 years? Yes - Care Gap present. Most recent result on file      5. For patients aged 21-65: Has the patient had a pap smear? Yes - Care Gap present.  Most recent result on file      Chief Complaint   Patient presents with    Diarrhea     X 24 hours    Vomiting    Fever    Chills     Patient-Reported Vitals 3/21/2022   Patient-Reported Weight -   Patient-Reported Height -   Patient-Reported Pulse -   Patient-Reported Temperature 99.0   Patient-Reported Systolic  -   Patient-Reported Diastolic -

## 2022-03-21 NOTE — PROGRESS NOTES
Consent:  Papi Stoll was evaluated through a synchronous (real-time) audio  encounter. The patient (or guardian if applicable) is aware that this is a billable service, which includes applicable co-pays. This audio visit was conducted with patients (and or legal guardians) consent. The visit was conducted pursuant to the emergency declaration under the [facility], [id number] authority and the coronavirus preparedness and [employer] Act. Patient identification was verified, and a care giver was present when appropriate. The patient was located in a state where the provider was licensed to provide care. I was in the office while conducting this encounter. Papi Stoll is a 57 y.o. female who was seen by synchronous (real-time) audio technology on 3/21/2022. Pt was seen at home. No other participants in this encounter. Subjective:   Diarrhea (X 24 hours), Vomiting, Fever, and Chills    Reports diarrhea, fever and malaise. Started last week. Vomited X 1 last night-woke her up out of her sleep. Took a zofran which relieved her nausea. Diarrhea started yesterday. Took two immodium which helped with diarrhea. She did have a fever of 102.5 last night. Checked her temperature on the phone today and it was 99. Takes care of her five grandchildren. All the grand children were sick last week with similar symptoms. Of note, took an at home covid test which was negative. PMH, SH, Medications/Allergies: reviewed, on chart. Current Outpatient Medications   Medication Sig    gabapentin (NEURONTIN) 300 mg capsule Take 1 Capsule by mouth three (3) times daily as needed for Pain. Max Daily Amount: 900 mg.  cyclobenzaprine (FLEXERIL) 10 mg tablet TAKE 1 TABLET BY MOUTH 2 TIMES DAILY AS NEEDED FOR MUSCLE SPASM(S).  ALPRAZolam (Xanax) 0.5 mg tablet Take 1 Tablet by mouth three (3) times daily as needed for Anxiety.  Max Daily Amount: 1.5 mg.   [place] hydroCHLOROthiazide (HYDRODIURIL) 25 mg tablet Take 1 Tablet by mouth daily.  carvediloL (COREG) 12.5 mg tablet TAKE 1 TABLET BY MOUTH TWICE A DAY    albuterol (PROVENTIL HFA, VENTOLIN HFA, PROAIR HFA) 90 mcg/actuation inhaler Take 2 Puffs by inhalation every four (4) hours as needed for Wheezing.  albuterol (PROVENTIL VENTOLIN) 2.5 mg /3 mL (0.083 %) nebu 3 mL by Nebulization route every four (4) hours as needed for Wheezing.  Nebulizer & Compressor machine Use q 4 hours as needed for dyspnea or wheezing    dronabinol (MARINOL) 5 mg capsule Take 5 mg by mouth as needed.  aspirin (ASPIRIN) 325 mg tablet Take 325 mg by mouth daily.  ondansetron (ZOFRAN ODT) 8 mg disintegrating tablet Take 8 mg by mouth every eight (8) hours as needed for Nausea.  pantoprazole (PROTONIX) 40 mg tablet Take 40 mg by mouth two (2) times a day.  nitroglycerin (NITROSTAT) 0.4 mg SL tablet 0.4 mg by SubLINGual route every five (5) minutes as needed for Chest Pain. Up to 3 doses. No current facility-administered medications for this visit. Allergies   Allergen Reactions    Ampicillin Anaphylaxis    Codeine Anaphylaxis    Morphine Anaphylaxis    Penicillins Anaphylaxis       ROS:  Gen: + fever, chills, fatigue  Resp: denies dyspnea, cough, or wheezing  CV: denies chest pain, pressure, or palpitations  Extremeties: denies edema  Neuro: denies numbness/tingling or dizziness  Skin: denies rashes or new lesions     VS review:   Wt Readings from Last 3 Encounters:   01/20/22 168 lb 8 oz (76.4 kg)   09/28/21 169 lb (76.7 kg)   08/09/21 167 lb 4 oz (75.9 kg)     BP Readings from Last 3 Encounters:   01/20/22 116/77   09/28/21 136/86   08/09/21 114/78       Objective:     General: alert, cooperative, no distress   Mental  status: mental status: alert, oriented to person, place, and time, normal mood, behavior, speech, dress, motor activity, and thought processes                   Assessment & Plan:   Viral infection Suspect viral infection  POC covid test ordered  POC flu ordered  Come to the office and we will test you in your car. Will call with results  Increase water intake daily  Treat fever with OTC tylenol and ibuprofen as needed  F/U in the office if symptoms do not improve. Patient agrees with plan of care and will follow up in the office if symptoms continue. Time-based coding, delete if not needed: I spent at least 15 minutes with this established patient, and >50% of the time was spent counseling and/or coordinating care regarding see above  Carlos Joe NP      Due to this being a TeleHealth evaluation, many elements of the physical examination are unable to be assessed. We discussed the expected course, resolution and complications of the diagnosis(es) in detail. Medication risks, benefits, costs, interactions, and alternatives were discussed as indicated. I advised her to contact the office if her condition worsens, changes or fails to improve as anticipated. She expressed understanding with the diagnosis(es) and plan. Pursuant to the emergency declaration under the [facility], [facility] waiver authority and the [employer] and [employer] Act, this Virtual  Visit was conducted, with patient's consent, to reduce the patient's risk of exposure to COVID-19 and provide continuity of care for an established patient. Services were provided through an audio discussion to substitute for in-person clinic visit.     CPT Codes 78324-49147 for Established Patients may apply to this Telehealth Visit

## 2022-03-22 ENCOUNTER — TELEPHONE (OUTPATIENT)
Dept: FAMILY MEDICINE CLINIC | Age: 62
End: 2022-03-22

## 2022-03-22 LAB
EXP DATE SOLUTION: NORMAL
EXP DATE SWAB: NORMAL
FLUAV+FLUBV AG NOSE QL IA.RAPID: NEGATIVE
FLUAV+FLUBV AG NOSE QL IA.RAPID: NEGATIVE
LOT NUMBER SOLUTION: NORMAL
LOT NUMBER SWAB: NORMAL
SARS-COV-2 RNA POC: NEGATIVE
VALID INTERNAL CONTROL?: YES

## 2022-03-22 NOTE — TELEPHONE ENCOUNTER
Pt came by and got her covid & flu test but is waiting around in town because she stated you were sending in a medication

## 2022-03-23 DIAGNOSIS — M54.6 ACUTE LEFT-SIDED THORACIC BACK PAIN: ICD-10-CM

## 2022-03-24 RX ORDER — CYCLOBENZAPRINE HCL 10 MG
TABLET ORAL
Qty: 60 TABLET | Refills: 0 | Status: SHIPPED | OUTPATIENT
Start: 2022-03-24 | End: 2022-05-25

## 2022-04-20 ENCOUNTER — OFFICE VISIT (OUTPATIENT)
Dept: FAMILY MEDICINE CLINIC | Age: 62
End: 2022-04-20
Payer: MEDICARE

## 2022-04-20 VITALS
TEMPERATURE: 98.1 F | HEIGHT: 62 IN | DIASTOLIC BLOOD PRESSURE: 88 MMHG | SYSTOLIC BLOOD PRESSURE: 138 MMHG | OXYGEN SATURATION: 98 % | HEART RATE: 70 BPM | RESPIRATION RATE: 20 BRPM | BODY MASS INDEX: 31.94 KG/M2 | WEIGHT: 173.6 LBS

## 2022-04-20 DIAGNOSIS — Z12.9 CANCER SCREENING: ICD-10-CM

## 2022-04-20 DIAGNOSIS — J45.20 MILD INTERMITTENT REACTIVE AIRWAY DISEASE WITHOUT COMPLICATION: ICD-10-CM

## 2022-04-20 DIAGNOSIS — F41.1 GAD (GENERALIZED ANXIETY DISORDER): ICD-10-CM

## 2022-04-20 DIAGNOSIS — M70.62 GREATER TROCHANTERIC BURSITIS OF LEFT HIP: Primary | ICD-10-CM

## 2022-04-20 DIAGNOSIS — F17.200 SMOKER: ICD-10-CM

## 2022-04-20 DIAGNOSIS — K21.9 GASTROESOPHAGEAL REFLUX DISEASE WITHOUT ESOPHAGITIS: ICD-10-CM

## 2022-04-20 DIAGNOSIS — M54.9 CHRONIC BILATERAL BACK PAIN, UNSPECIFIED BACK LOCATION: ICD-10-CM

## 2022-04-20 DIAGNOSIS — F43.10 PTSD (POST-TRAUMATIC STRESS DISORDER): ICD-10-CM

## 2022-04-20 DIAGNOSIS — E78.5 DYSLIPIDEMIA: ICD-10-CM

## 2022-04-20 DIAGNOSIS — I10 HYPERTENSION, ESSENTIAL: ICD-10-CM

## 2022-04-20 DIAGNOSIS — G89.29 CHRONIC BILATERAL BACK PAIN, UNSPECIFIED BACK LOCATION: ICD-10-CM

## 2022-04-20 PROCEDURE — G8417 CALC BMI ABV UP PARAM F/U: HCPCS | Performed by: FAMILY MEDICINE

## 2022-04-20 PROCEDURE — 3017F COLORECTAL CA SCREEN DOC REV: CPT | Performed by: FAMILY MEDICINE

## 2022-04-20 PROCEDURE — G8427 DOCREV CUR MEDS BY ELIG CLIN: HCPCS | Performed by: FAMILY MEDICINE

## 2022-04-20 PROCEDURE — G8754 DIAS BP LESS 90: HCPCS | Performed by: FAMILY MEDICINE

## 2022-04-20 PROCEDURE — 99214 OFFICE O/P EST MOD 30 MIN: CPT | Performed by: FAMILY MEDICINE

## 2022-04-20 PROCEDURE — G8510 SCR DEP NEG, NO PLAN REQD: HCPCS | Performed by: FAMILY MEDICINE

## 2022-04-20 PROCEDURE — 20610 DRAIN/INJ JOINT/BURSA W/O US: CPT | Performed by: FAMILY MEDICINE

## 2022-04-20 PROCEDURE — G8752 SYS BP LESS 140: HCPCS | Performed by: FAMILY MEDICINE

## 2022-04-20 PROCEDURE — G9899 SCRN MAM PERF RSLTS DOC: HCPCS | Performed by: FAMILY MEDICINE

## 2022-04-20 RX ORDER — TRIAMCINOLONE ACETONIDE 40 MG/ML
40 INJECTION, SUSPENSION INTRA-ARTICULAR; INTRAMUSCULAR ONCE
Status: COMPLETED | OUTPATIENT
Start: 2022-04-20 | End: 2022-04-20

## 2022-04-20 RX ORDER — GABAPENTIN 600 MG/1
600 TABLET ORAL 3 TIMES DAILY
Qty: 270 TABLET | Refills: 0 | Status: SHIPPED | OUTPATIENT
Start: 2022-04-20

## 2022-04-20 RX ORDER — PRAZOSIN HYDROCHLORIDE 2 MG/1
2 CAPSULE ORAL
Qty: 90 CAPSULE | Refills: 1 | Status: SHIPPED | OUTPATIENT
Start: 2022-04-20

## 2022-04-20 RX ORDER — ALPRAZOLAM 0.5 MG/1
0.5 TABLET ORAL
Qty: 270 TABLET | Refills: 0 | Status: SHIPPED | OUTPATIENT
Start: 2022-04-20 | End: 2022-07-15 | Stop reason: SDUPTHER

## 2022-04-20 RX ORDER — GABAPENTIN 300 MG/1
300 CAPSULE ORAL
Qty: 270 CAPSULE | Refills: 0 | Status: SHIPPED | OUTPATIENT
Start: 2022-04-20 | End: 2022-04-20

## 2022-04-20 RX ORDER — PANTOPRAZOLE SODIUM 40 MG/1
40 TABLET, DELAYED RELEASE ORAL 2 TIMES DAILY
Qty: 180 TABLET | Refills: 1 | Status: SHIPPED | OUTPATIENT
Start: 2022-04-20

## 2022-04-20 RX ADMIN — TRIAMCINOLONE ACETONIDE 40 MG: 40 INJECTION, SUSPENSION INTRA-ARTICULAR; INTRAMUSCULAR at 15:46

## 2022-04-20 NOTE — PROGRESS NOTES
Marquis Patel is a 57 y.o. female who presents with the following:  Chief Complaint   Patient presents with    Hypertension    Anxiety     PTSD nightmares    Hip Pain     Left GTB    GERD       Patient's hypertension is barely in the normal zone and I have told her that we should consider increasing one of her medicines. She is having no chest pain no shortness of breath no edema but she is having a fair amount of back pain and hip pain which is also keeping her awake at night so she is not getting a good night sleep. On top of that her PTSD is acting up and she is having nightmares again. I suggested to the patient and I would go way of handling both of these would be to put her on prazosin and see if it would stop the nightmares and enable her to sleep a bit better and also drop her blood pressure some. I did tell her that she had to be careful getting up and to get up slow otherwise she might get lightheaded. The patient thought she was having sciatica down the left side but she pointed to her hip and said it shot down the side of her leg to her knee and I told her I thought it was probably greater trochanteric bursitis and not her back and sciatica. For that I recommended an injection into the hip take care of the inflammation causing the pain. Patient also has GERD and was told that the pantoprazole is going to be running low this year and the pharmacy recommended that I send over another prescription so they could preorder it and have it in [place] start running low. Patient states she is doing well on the medication without any heartburn. Allergies   Allergen Reactions    Ampicillin Anaphylaxis    Codeine Anaphylaxis    Morphine Anaphylaxis    Penicillins Anaphylaxis       Current Outpatient Medications   Medication Sig    prazosin (MINIPRESS) 2 mg capsule Take 1 Capsule by mouth nightly.  Indications: posttraumatic stress syndrome    ALPRAZolam (Xanax) 0.5 mg tablet Take 1 Tablet by mouth three (3) times daily as needed for Anxiety. Max Daily Amount: 1.5 mg. Indications: repeated episodes of anxiety    pantoprazole (PROTONIX) 40 mg tablet Take 1 Tablet by mouth two (2) times a day.  gabapentin (NEURONTIN) 600 mg tablet Take 1 Tablet by mouth three (3) times daily. Max Daily Amount: 1,800 mg.  hydroCHLOROthiazide (HYDRODIURIL) 25 mg tablet TAKE 1 TABLET BY MOUTH EVERY DAY    cyclobenzaprine (FLEXERIL) 10 mg tablet TAKE 1 TABLET BY MOUTH 2 TIMES DAILY AS NEEDED FOR MUSCLE SPASM(S).  carvediloL (COREG) 12.5 mg tablet TAKE 1 TABLET BY MOUTH TWICE A DAY    albuterol (PROVENTIL HFA, VENTOLIN HFA, PROAIR HFA) 90 mcg/actuation inhaler Take 2 Puffs by inhalation every four (4) hours as needed for Wheezing.  albuterol (PROVENTIL VENTOLIN) 2.5 mg /3 mL (0.083 %) nebu 3 mL by Nebulization route every four (4) hours as needed for Wheezing.  Nebulizer & Compressor machine Use q 4 hours as needed for dyspnea or wheezing    dronabinol (MARINOL) 5 mg capsule Take 5 mg by mouth as needed.  aspirin (ASPIRIN) 325 mg tablet Take 325 mg by mouth daily.  ondansetron (ZOFRAN ODT) 8 mg disintegrating tablet Take 8 mg by mouth every eight (8) hours as needed for Nausea.  nitroglycerin (NITROSTAT) 0.4 mg SL tablet 0.4 mg by SubLINGual route every five (5) minutes as needed for Chest Pain. Up to 3 doses. No current facility-administered medications for this visit.        Past Medical History:   Diagnosis Date    Arthritis     Cancer ([address]     ovarian    Chronic HCV with state 2 hepatic fibrosis [facility] [address]     Dyslipidemia     Gastroparesis     Lymph edema     Menopause     Precordial pain 1/30/2019    Psychiatric disorder     anxiety    Umbilical discharge 9/9/9046       Past Surgical History:   Procedure Laterality Date    HX APPENDECTOMY      HX BREAST BIOPSY Left     benign    HX CHOLECYSTECTOMY      HX GYN      total hysterectomy    HX HYSTERECTOMY      HX OOPHORECTOMY Family History   Problem Relation Age of Onset    Breast Cancer Sister     No Known Problems Mother        Social History     Socioeconomic History    Marital status:    Tobacco Use    Smoking status: Current Every Day Smoker     Types: Cigarettes    Smokeless tobacco: Never Used    Tobacco comment: 5 cigs/day   Substance and Sexual Activity    Alcohol use: No    Drug use: No       Review of Systems   Constitutional: Negative for chills, fever, malaise/fatigue and weight loss. HENT: Negative for congestion, hearing loss, sore throat and tinnitus. Eyes: Negative for blurred vision, pain and discharge. Respiratory: Negative for cough, shortness of breath and wheezing. Cardiovascular: Negative for chest pain, palpitations, orthopnea, claudication and leg swelling. Gastrointestinal: Negative for abdominal pain, constipation and heartburn. Genitourinary: Negative for dysuria, frequency and urgency. Musculoskeletal: Negative for falls, joint pain and myalgias. Left hip pain over the GTB   Skin: Negative for itching and rash. Neurological: Negative for dizziness, tingling, tremors and headaches. Endo/Heme/Allergies: Negative for environmental allergies and polydipsia. Psychiatric/Behavioral: Negative for depression and substance abuse. The patient is nervous/anxious (Nightmares) and has insomnia (Secondary to hip pain and nightmares). Visit Vitals  /88   Pulse 70   Temp 98.1 °F (36.7 °C)   Resp 20   Ht 5' 2\" (1.575 m)   Wt 173 lb 9.6 oz (78.7 kg)   SpO2 98%   BMI 31.75 kg/m²     Physical Exam  Vitals reviewed. Constitutional:       General: She is not in acute distress. Appearance: Normal appearance. She is obese. She is not ill-appearing. HENT:      Head: Normocephalic and atraumatic.       Right Ear: Tympanic membrane, ear canal and external ear normal.      Left Ear: Tympanic membrane, ear canal and external ear normal.      Nose: Nose normal. No congestion or rhinorrhea. Mouth/Throat:      Mouth: Mucous membranes are moist.      Pharynx: No posterior oropharyngeal erythema. Eyes:      General:         Right eye: No discharge. Left eye: No discharge. Extraocular Movements: Extraocular movements intact. Conjunctiva/sclera: Conjunctivae normal.      Pupils: Pupils are equal, round, and reactive to light. Comments: Cornea anterior chamber and iris are normal.   Neck:      Trachea: No tracheal deviation. Cardiovascular:      Rate and Rhythm: Normal rate and regular rhythm. Pulses: Normal pulses. Heart sounds: Normal heart sounds. No murmur heard. No friction rub. No gallop. Pulmonary:      Effort: Pulmonary effort is normal. No respiratory distress. Breath sounds: Normal breath sounds. No wheezing or rhonchi. Chest:      Chest wall: No tenderness. Abdominal:      General: Bowel sounds are normal. There is no distension. Palpations: Abdomen is soft. There is no mass. Tenderness: There is no abdominal tenderness. There is no guarding or rebound. Musculoskeletal:         General: No tenderness or deformity. Cervical back: Normal range of motion and neck supple. Right lower leg: No edema. Left lower leg: No edema. Lymphadenopathy:      Cervical: No cervical adenopathy. Skin:     General: Skin is warm and dry. Coloration: Skin is not pale. Findings: No erythema or rash. Neurological:      General: No focal deficit present. Mental Status: She is alert and oriented to person, place, and time. Cranial Nerves: No cranial nerve deficit. Motor: No abnormal muscle tone. Deep Tendon Reflexes: Reflexes are normal and symmetric. Reflexes normal.      Comments: Cranial nerves II through XII are intact sensory and motor.   Biceps triceps knee and ankle DTRs are normal and symmetrical.   Psychiatric:         Mood and Affect: Mood normal.         Behavior: Behavior normal.         Thought Content: Thought content normal.         Judgment: Judgment normal.           ICD-10-CM ICD-9-CM    1. Greater trochanteric bursitis of left hip  M70.62 726.5 DRAIN/INJECT LARGE JOINT/BURSA   2. PTSD (post-traumatic stress disorder)  F43.10 309.81 prazosin (MINIPRESS) 2 mg capsule      ALPRAZolam (Xanax) 0.5 mg tablet   3. Hypertension, essential  I10 401.9    4. Gastroesophageal reflux disease without esophagitis  K21.9 530.81 pantoprazole (PROTONIX) 40 mg tablet   5. Mild intermittent reactive airway disease without complication  P04.11 390.71    6. Dyslipidemia  E78.5 272.4    7. JOHN (generalized anxiety disorder)  F41.1 300.02 ALPRAZolam (Xanax) 0.5 mg tablet   8. Smoker  F17.200 305.1    9. Cancer screening  Z12.9 V76.9 RONNELL 3D MENA W MAMMO BI SCREENING INCL CAD   8. Chronic bilateral back pain, unspecified back location  M54.9 724.5 gabapentin (NEURONTIN) 600 mg tablet    G89.29 338.29 DISCONTINUED: gabapentin (NEURONTIN) 300 mg capsule       Orders Placed This Encounter    DRAIN/INJECT LARGE JOINT/BURSA    RONNELL 3D MENA W MAMMO BI SCREENING INCL CAD     Standing Status:   Future     Standing Expiration Date:   10/20/2022    triamcinolone acetonide (KENALOG-40) 40 mg/mL injection 40 mg    prazosin (MINIPRESS) 2 mg capsule     Sig: Take 1 Capsule by mouth nightly. Indications: posttraumatic stress syndrome     Dispense:  90 Capsule     Refill:  1    DISCONTD: gabapentin (NEURONTIN) 300 mg capsule     Sig: Take 1 Capsule by mouth three (3) times daily as needed for Pain. Max Daily Amount: 900 mg. Dispense:  270 Capsule     Refill:  0    ALPRAZolam (Xanax) 0.5 mg tablet     Sig: Take 1 Tablet by mouth three (3) times daily as needed for Anxiety. Max Daily Amount: 1.5 mg. Indications: repeated episodes of anxiety     Dispense:  270 Tablet     Refill:  0    pantoprazole (PROTONIX) 40 mg tablet     Sig: Take 1 Tablet by mouth two (2) times a day.      Dispense:  180 Tablet     Refill: 1    gabapentin (NEURONTIN) 600 mg tablet     Sig: Take 1 Tablet by mouth three (3) times daily. Max Daily Amount: 1,800 mg. Dispense:  270 Tablet     Refill:  0   the procedure of bursal/joint injection was explained to the pt including risks/benefits and the pt elected to proceed so the skin over the left GTB to be injected was cleaned with alcohol and one ml [40 mg Kenalog] was injected into the bursa and anesthesia was obtained with 2% lidocaine with epinephrine 2 ml total-the pt tolerated the procedure well and ambulated out of the clinic on her own stating the hip did feel better. Follow-up and Dispositions    · Return in about 3 months (around 7/20/2022), or if symptoms worsen or fail to improve.          Mary Schultz MD

## 2022-04-20 NOTE — PROGRESS NOTES
1. Have you been to the ER, urgent care clinic since your last visit? Hospitalized since your last visit?no    2. Have you seen or consulted any other health care providers outside of the [address] [place] [address] since your last visit? Include any pap smears or colon screening. no  [facility] AT [address] [facility]  OFFICE PROCEDURE PROGRESS NOTE        Chart reviewed for the following:   Earlene SABA LPN, have reviewed the History, Physical and updated the Allergic reactions for [address] performed immediately prior to start of procedure:   Earlene SABA LPN, have performed the following reviews on Karin Marie prior to the start of the procedure:            * Patient was identified by name and date of birth   * Agreement on procedure being performed was verified  * Risks and Benefits explained to the patient by Sigrid Deras MD  * Procedure site verified and marked as necessary  * Patient was positioned for comfort  * Consent was signed and verified     Time:03:41pm      Date of procedure: 4/20/2022    Procedure performed by:  Sigrid Deras MD    Provider assisted by: thiago    Patient assisted by: self    Pre Procedural Pain Scale: 6    Procedure start time:  03:41    Procedure end time:  03: How tolerated by patient: tolerated well    Post Procedural Pain Scale: 2 - Hurts Little Bit    Comments: none    Post op instructions and patient education reviewed. Patient states understanding.     Copy of discharge instructions given to patient in AVS.

## 2022-05-19 ENCOUNTER — NURSE TRIAGE (OUTPATIENT)
Dept: OTHER | Facility: CLINIC | Age: 62
End: 2022-05-19

## 2022-05-19 ENCOUNTER — VIRTUAL VISIT (OUTPATIENT)
Dept: FAMILY MEDICINE CLINIC | Age: 62
End: 2022-05-19
Payer: MEDICARE

## 2022-05-19 DIAGNOSIS — S61.234A PUNCTURE WOUND WITHOUT FOREIGN BODY OF RIGHT RING FINGER WITHOUT DAMAGE TO NAIL, INITIAL ENCOUNTER: Primary | ICD-10-CM

## 2022-05-19 PROCEDURE — 3017F COLORECTAL CA SCREEN DOC REV: CPT | Performed by: NURSE PRACTITIONER

## 2022-05-19 PROCEDURE — G8417 CALC BMI ABV UP PARAM F/U: HCPCS | Performed by: NURSE PRACTITIONER

## 2022-05-19 PROCEDURE — 99213 OFFICE O/P EST LOW 20 MIN: CPT | Performed by: NURSE PRACTITIONER

## 2022-05-19 PROCEDURE — G9899 SCRN MAM PERF RSLTS DOC: HCPCS | Performed by: NURSE PRACTITIONER

## 2022-05-19 PROCEDURE — G8756 NO BP MEASURE DOC: HCPCS | Performed by: NURSE PRACTITIONER

## 2022-05-19 PROCEDURE — G8427 DOCREV CUR MEDS BY ELIG CLIN: HCPCS | Performed by: NURSE PRACTITIONER

## 2022-05-19 PROCEDURE — G8432 DEP SCR NOT DOC, RNG: HCPCS | Performed by: NURSE PRACTITIONER

## 2022-05-19 RX ORDER — DOXYCYCLINE 100 MG/1
100 TABLET ORAL 2 TIMES DAILY
Qty: 10 TABLET | Refills: 0 | Status: SHIPPED | OUTPATIENT
Start: 2022-05-19 | End: 2022-05-24

## 2022-05-19 RX ORDER — CLINDAMYCIN HYDROCHLORIDE 300 MG/1
CAPSULE ORAL
Qty: 15 CAPSULE | Refills: 0 | Status: SHIPPED | OUTPATIENT
Start: 2022-05-19 | End: 2022-07-15

## 2022-05-19 RX ORDER — CLINDAMYCIN HYDROCHLORIDE 150 MG/1
CAPSULE ORAL
Qty: 15 CAPSULE | Refills: 0 | Status: SHIPPED | OUTPATIENT
Start: 2022-05-19 | End: 2022-07-15

## 2022-05-19 NOTE — PROGRESS NOTES
1. \"Have you been to the ER, urgent care clinic since your last visit? Hospitalized since your last visit? \" No    2. \"Have you seen or consulted any other health care providers outside of the [address] [place] [address] since your last visit? \" No       Chief Complaint   Patient presents with    Dog Bite     X 1 day ago on right hand.       Patient-Reported Vitals 5/19/2022   Patient-Reported Weight 168   Patient-Reported Height 52   Patient-Reported Pulse -   Patient-Reported Temperature 98.7   Patient-Reported Systolic  620   Patient-Reported Diastolic 84

## 2022-05-19 NOTE — TELEPHONE ENCOUNTER
Received call from Jluis at [facility] with Red Flag Complaint. Subjective: Caller states \"dog bite\"     Current Symptoms: Bit by her dog yesterday. Right hand pinky and ring finger bitten. Cleansed it well but today ring finger is very swollen and red    Onset: 1 day ago; sudden    Associated Symptoms: NA    Pain Severity: 6/10; aching and pain; constant    Temperature: denies     What has been tried: cleansed and antibiotic ointment applied    LMP: NA Pregnant: NA    Recommended disposition: See in Office Today    Care advice provided, patient verbalizes understanding; denies any other questions or concerns; instructed to call back for any new or worsening symptoms. Patient/Caller agrees with recommended disposition; writer provided warm transfer to [facility] at [facility] for appointment scheduling    Attention Provider: Thank you for allowing me to participate in the care of your patient. The patient was connected to triage in response to information provided to the [facility]. Please do not respond through this encounter as the response is not directed to a shared pool.         Reason for Disposition   Patient wants to be seen    Protocols used: ANIMAL BITE-ADULT-OH

## 2022-05-19 NOTE — PROGRESS NOTES
Consent:  She and/or her healthcare decision maker is aware that this patient-initiated Telehealth encounter is a billable service, with coverage as determined by her insurance carrier. She is aware that she may receive a bill and has provided verbal consent to proceed: Yes    I was in the office while conducting this encounter. Suzan Galloway is a 57 y.o. female who was seen by synchronous (real-time) audio-video technology on 5/19/2022. Pt was seen at home. No other participants in this encounter. Subjective:   Dog Bite (X 1 day ago on right hand. )    Ms. Boogie Moreira is a 64yo female who presents today via video visit with c/o a dog bite on her right 4th finger. Her jack robert bit her yesterday and today her finger is swollen and red and hot and the redness has started to spread down her hand. She denies numbness or tingling or fever or chills. She cannot remember how long ago she received her last Tetanus vaccine but believes it was given between 5 and 10 years ago. PMH, SH, Medications/Allergies: reviewed, on chart. Current Outpatient Medications   Medication Sig    prazosin (MINIPRESS) 2 mg capsule Take 1 Capsule by mouth nightly. Indications: posttraumatic stress syndrome    ALPRAZolam (Xanax) 0.5 mg tablet Take 1 Tablet by mouth three (3) times daily as needed for Anxiety. Max Daily Amount: 1.5 mg. Indications: repeated episodes of anxiety    pantoprazole (PROTONIX) 40 mg tablet Take 1 Tablet by mouth two (2) times a day.  gabapentin (NEURONTIN) 600 mg tablet Take 1 Tablet by mouth three (3) times daily. Max Daily Amount: 1,800 mg.  hydroCHLOROthiazide (HYDRODIURIL) 25 mg tablet TAKE 1 TABLET BY MOUTH EVERY DAY    cyclobenzaprine (FLEXERIL) 10 mg tablet TAKE 1 TABLET BY MOUTH 2 TIMES DAILY AS NEEDED FOR MUSCLE SPASM(S).     carvediloL (COREG) 12.5 mg tablet TAKE 1 TABLET BY MOUTH TWICE A DAY    albuterol (PROVENTIL HFA, VENTOLIN HFA, PROAIR HFA) 90 mcg/actuation inhaler Take 2 Puffs by inhalation every four (4) hours as needed for Wheezing.  albuterol (PROVENTIL VENTOLIN) 2.5 mg /3 mL (0.083 %) nebu 3 mL by Nebulization route every four (4) hours as needed for Wheezing.  Nebulizer & Compressor machine Use q 4 hours as needed for dyspnea or wheezing    dronabinol (MARINOL) 5 mg capsule Take 5 mg by mouth as needed.  aspirin (ASPIRIN) 325 mg tablet Take 325 mg by mouth daily.  ondansetron (ZOFRAN ODT) 8 mg disintegrating tablet Take 8 mg by mouth every eight (8) hours as needed for Nausea.  nitroglycerin (NITROSTAT) 0.4 mg SL tablet 0.4 mg by SubLINGual route every five (5) minutes as needed for Chest Pain. Up to 3 doses. No current facility-administered medications for this visit. Allergies   Allergen Reactions    Ampicillin Anaphylaxis    Codeine Anaphylaxis    Morphine Anaphylaxis    Penicillins Anaphylaxis       ROS:  Gen: denies fever, chills, or fatigue  Resp: denies dyspnea, cough, or wheezing  CV: denies chest pain, pressure, or palpitations  Extremeties: + edema to right 4th finger   Musculoskeletal: no joint pain, stiffness, or muscle cramps  Neuro: denies numbness/tingling, extremity weakness, or dizziness  Skin: +puncture wound to right 4th finger with surrounding redness and heat    VS review: Wt Readings from Last 3 Encounters:   04/20/22 173 lb 9.6 oz (78.7 kg)   01/20/22 168 lb 8 oz (76.4 kg)   09/28/21 169 lb (76.7 kg)     BP Readings from Last 3 Encounters:   04/20/22 138/88   01/20/22 116/77   09/28/21 136/86       Objective:     General: alert, cooperative, no distress   Mental  status: mental status: alert, oriented to person, place, and time, normal mood, behavior, speech, dress, motor activity, and thought processes   Resp: resp: normal effort and no respiratory distress   Neuro: neuro: no gross deficits   Skin: +puncture wound to right 4th finger with surrounding redness and heat.  Unable to visualize any drainage       Assessment & Plan: Dog bite, initial encounter  Informed patient we will need 2 antibiotics to cover this infection. She has a hx of anaphylaxis to PCN so will go with Doxy 100mg BID x 5 days and Clindamycin 450mg TID x 5 days. Pt verbalized understanding and states she has taken both these antibx in the past without complications. Advised to update tetanus shot and told her we can give it to her here. States she cannot leave the house today since she is babysitting grandchildren but can swing by tomorrow. F/U prn if symptoms worsen or do not improve. Time-based coding, delete if not needed: I spent at least 15 minutes with this established patient, and >50% of the time was spent counseling and/or coordinating care regarding dog bite with infection. Gwendolyn Nicole NP      Due to this being a TeleHealth evaluation, many elements of the physical examination are unable to be assessed. We discussed the expected course, resolution and complications of the diagnosis(es) in detail. Medication risks, benefits, costs, interactions, and alternatives were discussed as indicated. I advised her to contact the office if her condition worsens, changes or fails to improve as anticipated. She expressed understanding with the diagnosis(es) and plan. Pursuant to the emergency declaration under the [facility], [facility] waiver authority and the [employer] and [employer] General Act, this Virtual  Visit was conducted, with patient's consent, to reduce the patient's risk of exposure to COVID-19 and provide continuity of care for an established patient. Services were provided through a video synchronous discussion virtually to substitute for in-person clinic visit.     CPT Codes 00059-29784 for Established Patients may apply to this Telehealth Visit

## 2022-05-25 DIAGNOSIS — M54.6 ACUTE LEFT-SIDED THORACIC BACK PAIN: ICD-10-CM

## 2022-05-25 RX ORDER — CYCLOBENZAPRINE HCL 10 MG
TABLET ORAL
Qty: 60 TABLET | Refills: 0 | Status: SHIPPED | OUTPATIENT
Start: 2022-05-25 | End: 2022-07-13

## 2022-06-14 ENCOUNTER — TRANSCRIBE ORDER (OUTPATIENT)
Dept: SCHEDULING | Age: 62
End: 2022-06-14

## 2022-06-14 DIAGNOSIS — Z12.31 VISIT FOR SCREENING MAMMOGRAM: Primary | ICD-10-CM

## 2022-06-23 ENCOUNTER — TRANSCRIBE ORDER (OUTPATIENT)
Dept: REGISTRATION | Age: 62
End: 2022-06-23

## 2022-06-23 ENCOUNTER — HOSPITAL ENCOUNTER (OUTPATIENT)
Dept: MAMMOGRAPHY | Age: 62
Discharge: HOME | End: 2022-06-23
Attending: FAMILY MEDICINE
Payer: MEDICARE

## 2022-06-23 DIAGNOSIS — Z12.31 VISIT FOR SCREENING MAMMOGRAM: ICD-10-CM

## 2022-06-23 DIAGNOSIS — M25.561 RIGHT KNEE PAIN: Primary | ICD-10-CM

## 2022-06-23 PROCEDURE — 77063 BREAST TOMOSYNTHESIS BI: CPT

## 2022-07-15 ENCOUNTER — OFFICE VISIT (OUTPATIENT)
Dept: FAMILY MEDICINE CLINIC | Age: 62
End: 2022-07-15
Payer: MEDICARE

## 2022-07-15 VITALS
OXYGEN SATURATION: 93 % | SYSTOLIC BLOOD PRESSURE: 136 MMHG | TEMPERATURE: 98 F | WEIGHT: 172.2 LBS | RESPIRATION RATE: 16 BRPM | DIASTOLIC BLOOD PRESSURE: 78 MMHG | HEIGHT: 62 IN | HEART RATE: 70 BPM | BODY MASS INDEX: 31.69 KG/M2

## 2022-07-15 DIAGNOSIS — K74.00 LIVER FIBROSIS: ICD-10-CM

## 2022-07-15 DIAGNOSIS — Z86.19 HISTORY OF HEPATITIS C: ICD-10-CM

## 2022-07-15 DIAGNOSIS — K31.84 GASTROPARESIS: ICD-10-CM

## 2022-07-15 DIAGNOSIS — M25.559 HIP PAIN: Primary | ICD-10-CM

## 2022-07-15 DIAGNOSIS — F41.1 GAD (GENERALIZED ANXIETY DISORDER): Primary | ICD-10-CM

## 2022-07-15 DIAGNOSIS — F43.10 PTSD (POST-TRAUMATIC STRESS DISORDER): ICD-10-CM

## 2022-07-15 PROCEDURE — G8510 SCR DEP NEG, NO PLAN REQD: HCPCS | Performed by: FAMILY MEDICINE

## 2022-07-15 PROCEDURE — G9899 SCRN MAM PERF RSLTS DOC: HCPCS | Performed by: FAMILY MEDICINE

## 2022-07-15 PROCEDURE — G8752 SYS BP LESS 140: HCPCS | Performed by: FAMILY MEDICINE

## 2022-07-15 PROCEDURE — 99213 OFFICE O/P EST LOW 20 MIN: CPT | Performed by: FAMILY MEDICINE

## 2022-07-15 PROCEDURE — G8427 DOCREV CUR MEDS BY ELIG CLIN: HCPCS | Performed by: FAMILY MEDICINE

## 2022-07-15 PROCEDURE — 3017F COLORECTAL CA SCREEN DOC REV: CPT | Performed by: FAMILY MEDICINE

## 2022-07-15 PROCEDURE — G8754 DIAS BP LESS 90: HCPCS | Performed by: FAMILY MEDICINE

## 2022-07-15 PROCEDURE — G8417 CALC BMI ABV UP PARAM F/U: HCPCS | Performed by: FAMILY MEDICINE

## 2022-07-15 RX ORDER — ALPRAZOLAM 0.5 MG/1
0.5 TABLET ORAL
Qty: 270 TABLET | Refills: 0 | Status: SHIPPED | OUTPATIENT
Start: 2022-07-15 | End: 2022-10-19 | Stop reason: SDUPTHER

## 2022-07-15 NOTE — PROGRESS NOTES
Erik Plasencia is a 57 y.o. female who presents with the following:  Chief Complaint   Patient presents with    Post Traumatic Stress Disorder     Needs refill of alprazolam-occasionally lightheaded on prazosin for her PTSD.  Cirrhosis Of Liver     After hepatitis C       Patient states she is doing well on her medications for PTSD but needs a refill of her alprazolam.  She states she is occasionally lightheaded on her prazosin but on the prazosin she is no longer having bad dreams. I told the patient its best when she is on the medication to get up slowly from laying down or get up slowly from a chair and not try to jump up which will bring on lightheadedness. The patient has cirrhosis of the liver and this is from hepatitis C which is since been cured but she has been instructed to follow-up and her gastroenterologist is in [place] about a 5-hour trip away and she would rather go to [address] so I will line up a consultation with Dr. Holly Graves. Allergies   Allergen Reactions    Ampicillin Anaphylaxis    Codeine Anaphylaxis    Morphine Anaphylaxis    Penicillins Anaphylaxis       Current Outpatient Medications   Medication Sig    ALPRAZolam (Xanax) 0.5 mg tablet Take 1 Tablet by mouth three (3) times daily as needed for Anxiety. Max Daily Amount: 1.5 mg. Indications: repeated episodes of anxiety    cyclobenzaprine (FLEXERIL) 10 mg tablet TAKE 1 TABLET BY MOUTH 2 TIMES DAILY AS NEEDED FOR MUSCLE SPASM(S).  carvediloL (COREG) 12.5 mg tablet TAKE 1 TABLET BY MOUTH TWICE A DAY    clindamycin (CLEOCIN) 300 mg capsule Take 1 pill with a 300mg pill three times daily x 5 days  Indications: an infection of the skin and the tissue below the skin    clindamycin (CLEOCIN) 150 mg capsule Take 1 pill with a 300mg pill three times daily x 5 days  Indications: an infection of the skin and the tissue below the skin    prazosin (MINIPRESS) 2 mg capsule Take 1 Capsule by mouth nightly.  Indications: posttraumatic stress syndrome    pantoprazole (PROTONIX) 40 mg tablet Take 1 Tablet by mouth two (2) times a day.  gabapentin (NEURONTIN) 600 mg tablet Take 1 Tablet by mouth three (3) times daily. Max Daily Amount: 1,800 mg.  hydroCHLOROthiazide (HYDRODIURIL) 25 mg tablet TAKE 1 TABLET BY MOUTH EVERY DAY    albuterol (PROVENTIL HFA, VENTOLIN HFA, PROAIR HFA) 90 mcg/actuation inhaler Take 2 Puffs by inhalation every four (4) hours as needed for Wheezing.  albuterol (PROVENTIL VENTOLIN) 2.5 mg /3 mL (0.083 %) nebu 3 mL by Nebulization route every four (4) hours as needed for Wheezing.  Nebulizer & Compressor machine Use q 4 hours as needed for dyspnea or wheezing    dronabinol (MARINOL) 5 mg capsule Take 5 mg by mouth as needed.  aspirin (ASPIRIN) 325 mg tablet Take 325 mg by mouth daily.  ondansetron (ZOFRAN ODT) 8 mg disintegrating tablet Take 8 mg by mouth every eight (8) hours as needed for Nausea.  nitroglycerin (NITROSTAT) 0.4 mg SL tablet 0.4 mg by SubLINGual route every five (5) minutes as needed for Chest Pain. Up to 3 doses. No current facility-administered medications for this visit.        Past Medical History:   Diagnosis Date    Arthritis     Cancer ([facility] [address]     ovarian    Chronic HCV with state 2 hepatic fibrosis (HCC)     Dyslipidemia     Gastroparesis     Lymph edema     Menopause     Precordial pain 1/30/2019    Psychiatric disorder     anxiety    Umbilical discharge 6/2/9641       Past Surgical History:   Procedure Laterality Date    HX APPENDECTOMY      HX BREAST BIOPSY Left     benign    HX CHOLECYSTECTOMY      HX GYN      total hysterectomy    HX HYSTERECTOMY      HX OOPHORECTOMY         Family History   Problem Relation Age of Onset    Breast Cancer Sister         dx age 29's   [place] No Known Problems Mother        Social History     Socioeconomic History    Marital status:    Tobacco Use    Smoking status: Current Every Day Smoker     Packs/day: 1.00     Years: 35.00     Pack years: 35.00     Types: Cigarettes    Smokeless tobacco: Never Used    Tobacco comment: Patient has cut down to 5 cigarettes a day   Substance and Sexual Activity    Alcohol use: No    Drug use: No       Review of Systems   Constitutional: Negative for chills, fever, malaise/fatigue and weight loss. HENT: Negative for congestion, hearing loss, sore throat and tinnitus. Eyes: Negative for blurred vision, pain and discharge. Respiratory: Negative for cough, shortness of breath and wheezing. Cardiovascular: Negative for chest pain, palpitations, orthopnea, claudication and leg swelling. Gastrointestinal: Negative for abdominal pain, constipation and heartburn. Genitourinary: Negative for dysuria, frequency and urgency. Musculoskeletal: Negative for falls, joint pain and myalgias. Skin: Negative for itching and rash. Neurological: Negative for dizziness, tingling, tremors and headaches. Endo/Heme/Allergies: Negative for environmental allergies and polydipsia. Psychiatric/Behavioral: Negative for depression and substance abuse. The patient is not nervous/anxious. Visit Vitals  /78   Pulse 70   Temp 98 °F (36.7 °C)   Resp 16   Ht 5' 2\" (1.575 m)   Wt 172 lb 3.2 oz (78.1 kg)   SpO2 93%   BMI 31.50 kg/m²     Physical Exam  Vitals reviewed. Constitutional:       General: She is not in acute distress. Appearance: Normal appearance. She is obese. She is not ill-appearing. HENT:      Head: Normocephalic and atraumatic. Right Ear: Tympanic membrane, ear canal and external ear normal.      Left Ear: Tympanic membrane, ear canal and external ear normal.      Nose: Nose normal. No congestion or rhinorrhea. Mouth/Throat:      Mouth: Mucous membranes are moist.      Pharynx: No posterior oropharyngeal erythema. Eyes:      General:         Right eye: No discharge. Left eye: No discharge.       Extraocular Movements: Extraocular movements . intact. Conjunctiva/sclera: Conjunctivae normal.      Pupils: Pupils are equal, round, and reactive to light. Comments: Cornea anterior chamber and iris are normal.   Neck:      Trachea: No tracheal deviation. Cardiovascular:      Rate and Rhythm: Normal rate and regular rhythm. Pulses: Normal pulses. Heart sounds: Normal heart sounds. No murmur heard. No friction rub. No gallop. Pulmonary:      Effort: Pulmonary effort is normal. No respiratory distress. Breath sounds: Normal breath sounds. No wheezing or rhonchi. Chest:      Chest wall: No tenderness. Abdominal:      General: Bowel sounds are normal. There is no distension. Palpations: Abdomen is soft. There is no mass. Tenderness: There is no abdominal tenderness. There is no guarding or rebound. Musculoskeletal:         General: No tenderness or deformity. Cervical back: Normal range of motion and neck supple. Right lower leg: No edema. Left lower leg: No edema. Lymphadenopathy:      Cervical: No cervical adenopathy. Skin:     General: Skin is warm and dry. Coloration: Skin is not pale. Findings: No erythema or rash. Neurological:      General: No focal deficit present. Mental Status: She is alert and oriented to person, place, and time. Cranial Nerves: No cranial nerve deficit. Motor: No abnormal muscle tone. Deep Tendon Reflexes: Reflexes are normal and symmetric. Reflexes normal.      Comments: Cranial nerves II through XII are intact sensory and motor. Biceps triceps knee and ankle DTRs are normal and symmetrical.   Psychiatric:         Mood and Affect: Mood normal.         Behavior: Behavior normal.         Thought Content: Thought content normal.         Judgment: Judgment normal.           ICD-10-CM ICD-9-CM    1. JOHN (generalized anxiety disorder)  F41.1 300.02 ALPRAZolam (Xanax) 0.5 mg tablet   2.  PTSD (post-traumatic stress disorder)  F43.10 309.81 ALPRAZolam (Xanax) 0.5 mg tablet   3. Liver fibrosis  K74.00 571.5 REFERRAL TO LIVER HEPATOLOGY   4. History of hepatitis C  Z86.19 V12.09 REFERRAL TO LIVER HEPATOLOGY   5. Gastroparesis  K31.84 536.3 REFERRAL TO LIVER HEPATOLOGY       Orders Placed This Encounter    [facility] EMPL     Referral Priority:   Routine     Referral Type:   Consultation     Referral Reason:   Specialty Services Required     Referred to Provider:   Patricia Hicks MD     Number of Visits Requested:   1    ALPRAZolam (Xanax) 0.5 mg tablet     Sig: Take 1 Tablet by mouth three (3) times daily as needed for Anxiety. Max Daily Amount: 1.5 mg.  Indications: repeated episodes of anxiety     Dispense:  270 Tablet     Refill:  0           Saira Mock MD

## 2022-07-15 NOTE — PROGRESS NOTES
Chief Complaint   Patient presents with    Post Traumatic Stress Disorder       1. \"Have you been to the ER, urgent care clinic since your last visit? Hospitalized since your last visit? \" No    2. \"Have you seen or consulted any other health care providers outside of the [address] [place] [address] since your last visit? \" No     3. For patients aged 39-70: Has the patient had a colonoscopy / FIT/ Cologuard? Yes - no Care Gap present      If the patient is female:    4. For patients aged 41-77: Has the patient had a mammogram within the past 2 years? Yes - no Care Gap present      5. For patients aged 21-65: Has the patient had a pap smear? Yes, No Care gap present. Identified pt with two pt identifiers(name and ). Reviewed record in preparation for visit and have obtained necessary documentation.     Symptom review:    NO  Fever   NO  Shaking chills  NO  Cough  NO Headaches  NO  Body aches  NO  Coughing up blood  NO  Chest congestion  NO  Chest pain  NO  Shortness of breath  NO  Profound Loss of smell/taste  NO  Nausea/Vomiting   NO  Loose stool/Diarrhea  NO  any skin issues

## 2022-07-21 ENCOUNTER — DOCUMENTATION ONLY (OUTPATIENT)
Dept: FAMILY MEDICINE CLINIC | Age: 62
End: 2022-07-21

## 2022-08-03 ENCOUNTER — DOCUMENTATION ONLY (OUTPATIENT)
Dept: HEMATOLOGY | Age: 62
End: 2022-08-03

## 2022-08-03 NOTE — PROGRESS NOTES
LM asking patient to call to schedule NP appt.  Records received filed in NP appt left message folder

## 2022-08-12 ENCOUNTER — TELEPHONE (OUTPATIENT)
Dept: FAMILY MEDICINE CLINIC | Age: 62
End: 2022-08-12

## 2022-08-12 DIAGNOSIS — I10 HYPERTENSION, ESSENTIAL: ICD-10-CM

## 2022-08-12 RX ORDER — CARVEDILOL 12.5 MG/1
12.5 TABLET ORAL 2 TIMES DAILY
Qty: 180 TABLET | Refills: 0 | Status: SHIPPED | OUTPATIENT
Start: 2022-08-12

## 2022-09-20 DIAGNOSIS — M54.6 ACUTE LEFT-SIDED THORACIC BACK PAIN: ICD-10-CM

## 2022-09-21 RX ORDER — CYCLOBENZAPRINE HCL 10 MG
TABLET ORAL
Qty: 30 TABLET | Refills: 0 | Status: SHIPPED | OUTPATIENT
Start: 2022-09-21 | End: 2022-10-19 | Stop reason: SDUPTHER

## 2022-10-12 DIAGNOSIS — J45.20 MILD INTERMITTENT REACTIVE AIRWAY DISEASE WITHOUT COMPLICATION: ICD-10-CM

## 2022-10-18 RX ORDER — ALBUTEROL SULFATE 90 UG/1
AEROSOL, METERED RESPIRATORY (INHALATION)
Qty: 6.7 EACH | Refills: 5 | Status: SHIPPED | OUTPATIENT
Start: 2022-10-18

## 2022-10-19 ENCOUNTER — OFFICE VISIT (OUTPATIENT)
Dept: FAMILY MEDICINE CLINIC | Age: 62
End: 2022-10-19
Payer: MEDICARE

## 2022-10-19 VITALS
BODY MASS INDEX: 31.86 KG/M2 | DIASTOLIC BLOOD PRESSURE: 87 MMHG | WEIGHT: 173.13 LBS | RESPIRATION RATE: 18 BRPM | TEMPERATURE: 97.4 F | HEIGHT: 62 IN | HEART RATE: 92 BPM | OXYGEN SATURATION: 93 % | SYSTOLIC BLOOD PRESSURE: 130 MMHG

## 2022-10-19 DIAGNOSIS — K21.9 GASTROESOPHAGEAL REFLUX DISEASE WITHOUT ESOPHAGITIS: ICD-10-CM

## 2022-10-19 DIAGNOSIS — F41.1 GAD (GENERALIZED ANXIETY DISORDER): ICD-10-CM

## 2022-10-19 DIAGNOSIS — J45.20 MILD INTERMITTENT REACTIVE AIRWAY DISEASE WITHOUT COMPLICATION: Primary | ICD-10-CM

## 2022-10-19 DIAGNOSIS — J45.20 MILD INTERMITTENT ASTHMA WITHOUT COMPLICATION: ICD-10-CM

## 2022-10-19 DIAGNOSIS — F43.10 PTSD (POST-TRAUMATIC STRESS DISORDER): ICD-10-CM

## 2022-10-19 DIAGNOSIS — M54.6 ACUTE LEFT-SIDED THORACIC BACK PAIN: ICD-10-CM

## 2022-10-19 DIAGNOSIS — I10 HYPERTENSION, ESSENTIAL: ICD-10-CM

## 2022-10-19 PROCEDURE — G8754 DIAS BP LESS 90: HCPCS | Performed by: FAMILY MEDICINE

## 2022-10-19 PROCEDURE — G8752 SYS BP LESS 140: HCPCS | Performed by: FAMILY MEDICINE

## 2022-10-19 PROCEDURE — 3017F COLORECTAL CA SCREEN DOC REV: CPT | Performed by: FAMILY MEDICINE

## 2022-10-19 PROCEDURE — G8510 SCR DEP NEG, NO PLAN REQD: HCPCS | Performed by: FAMILY MEDICINE

## 2022-10-19 PROCEDURE — 99214 OFFICE O/P EST MOD 30 MIN: CPT | Performed by: FAMILY MEDICINE

## 2022-10-19 PROCEDURE — G9899 SCRN MAM PERF RSLTS DOC: HCPCS | Performed by: FAMILY MEDICINE

## 2022-10-19 PROCEDURE — G8417 CALC BMI ABV UP PARAM F/U: HCPCS | Performed by: FAMILY MEDICINE

## 2022-10-19 PROCEDURE — G8427 DOCREV CUR MEDS BY ELIG CLIN: HCPCS | Performed by: FAMILY MEDICINE

## 2022-10-19 RX ORDER — ALBUTEROL SULFATE 0.83 MG/ML
2.5 SOLUTION RESPIRATORY (INHALATION)
Qty: 360 EACH | Refills: 1 | Status: SHIPPED | OUTPATIENT
Start: 2022-10-19

## 2022-10-19 RX ORDER — PREDNISONE 20 MG/1
TABLET ORAL
Qty: 30 TABLET | Refills: 0 | Status: SHIPPED | OUTPATIENT
Start: 2022-10-19

## 2022-10-19 RX ORDER — ALPRAZOLAM 0.5 MG/1
0.5 TABLET ORAL
Qty: 270 TABLET | Refills: 0 | Status: SHIPPED | OUTPATIENT
Start: 2022-10-19

## 2022-10-19 RX ORDER — CYCLOBENZAPRINE HCL 10 MG
TABLET ORAL
Qty: 180 TABLET | Refills: 1 | Status: SHIPPED | OUTPATIENT
Start: 2022-10-19

## 2022-10-19 NOTE — PROGRESS NOTES
Shilpi Doyle is a 60 y.o. female who presents with the following:  Chief Complaint   Patient presents with    Hypertension    GERD    Asthma    Anxiety     Patient with JOHN and PTSD    Cholesterol Problem       Patient's essential hypertension is doing well on current medication without chest pain shortness of breath nor edema. The patient's GERD is doing well on her pantoprazole and she states that her pharmacy is Sending her more prescription so she has plenty for the next year. I told her if she wants this to stop she needs to get off of their automatic refill list.  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 The patient is getting exceedingly low on her albuterol for her nebulizer and she needs new plastic tubing and a nebulizer kit so she can continue her treatments. The patient has both generalized anxiety disorder and PTSD which has been doing well on her medications but she is running low on her alprazolam and she understands she has to come in every 3 months to have that checked. Patient does have a history of dyslipidemia but currently is trying to manage this with diet and has been able to lose some weight. Allergies   Allergen Reactions    Ampicillin Anaphylaxis    Codeine Anaphylaxis    Morphine Anaphylaxis    Penicillins Anaphylaxis       Current Outpatient Medications   Medication Sig    albuterol (PROVENTIL VENTOLIN) 2.5 mg /3 mL (0.083 %) nebu 3 mL by Nebulization route every four (4) hours as needed for Wheezing. ALPRAZolam (Xanax) 0.5 mg tablet Take 1 Tablet by mouth three (3) times daily as needed for Anxiety. Max Daily Amount: 1.5 mg.  Indications: repeated episodes of anxiety    cyclobenzaprine (FLEXERIL) 10 mg tablet TAKE 1 TABLET BY MOUTH 2 TIMES DAILY AS NEEDED FOR MUSCLE SPASM(S). Indications: muscle spasm    Nebulizer Accessories kit Use every 4 hours as needed for wheezing    predniSONE (DELTASONE) 20 mg tablet 5 tablets day for days 1 through 4, then 4 tablets on day 5, then 3 tablets on day 6, then 2 tablets on day 7, then 1 tablet on day 8.    albuterol (PROVENTIL HFA, VENTOLIN HFA, PROAIR HFA) 90 mcg/actuation inhaler INHALE 2 PUFFS BY MOUTH EVERY 4 HOURS AS NEEDED FOR WHEEZE    carvediloL (COREG) 12.5 mg tablet Take 1 Tablet by mouth two (2) times a day. hydroCHLOROthiazide (HYDRODIURIL) 25 mg tablet TAKE 1 TABLET BY MOUTH EVERY DAY    prazosin (MINIPRESS) 2 mg capsule Take 1 Capsule by mouth nightly. Indications: posttraumatic stress syndrome    pantoprazole (PROTONIX) 40 mg tablet Take 1 Tablet by mouth two (2) times a day.    gabapentin (NEURONTIN) 600 mg tablet Take 1 Tablet by mouth three (3) times daily. Max Daily Amount: 1,800 mg. Nebulizer & Compressor machine Use q 4 hours as needed for dyspnea or wheezing    dronabinoL (MARINOL) 5 mg capsule Take 5 mg by mouth as needed. aspirin (ASPIRIN) 325 mg tablet Take 325 mg by mouth daily. ondansetron (ZOFRAN ODT) 8 mg disintegrating tablet Take 8 mg by mouth every eight (8) hours as needed for Nausea. nitroglycerin (NITROSTAT) 0.4 mg SL tablet 0.4 mg by SubLINGual route every five (5) minutes as needed for Chest Pain. Up to 3 doses. No current facility-administered medications for this visit.        Past Medical History:   Diagnosis Date    Arthritis     Cancer ([address]     ovarian    Chronic HCV with state 2 hepatic fibrosis ([facility] [address]     Dyslipidemia     Gastroparesis     Lymph edema     Menopause     Precordial pain 1/30/2019    Psychiatric disorder     anxiety    Umbilical discharge 7/4/8510       Past Surgical History:   Procedure Laterality Date    HX APPENDECTOMY      HX BREAST BIOPSY Left     benign    HX CHOLECYSTECTOMY      HX GYN      total hysterectomy    HX HYSTERECTOMY      HX OOPHORECTOMY         Family History   Problem Relation Age of Onset    Breast Cancer Sister         dx age 29's    No Known Problems Mother        Social History     Socioeconomic History    Marital status:    Tobacco Use    Smoking status: Every Day     Packs/day: 1.00     Years: 35.00     Pack years: 35.00     Types: Cigarettes    Smokeless tobacco: Never    Tobacco comments:     Patient has cut down to 5 cigarettes a day   Substance and Sexual Activity    Alcohol use: No    Drug use: No       Review of Systems   Constitutional:  Positive for weight loss. Intentional   HENT: Negative. Eyes: Negative. Respiratory:  Positive for cough, sputum production and wheezing. Negative for hemoptysis and shortness of breath. Cardiovascular: Negative. Gastrointestinal: Negative. Genitourinary: Negative. Musculoskeletal:  Positive for back pain, joint pain and myalgias. Negative for falls and neck pain. Skin: Negative. Neurological: Negative. Endo/Heme/Allergies: Negative. Psychiatric/Behavioral: Negative. Visit Vitals  /87 (BP 1 Location: Left upper arm)   Pulse 92   Temp 97.4 °F (36.3 °C) (Temporal)   Resp 18   Ht 5' 2\" (1.575 m)   Wt 173 lb 2 oz (78.5 kg)   SpO2 93%   BMI 31.66 kg/m²     Physical Exam  Vitals reviewed. Constitutional:       General: She is not in acute distress. Appearance: Normal appearance. She is obese. She is not ill-appearing. HENT:      Head: Normocephalic and atraumatic. Right Ear: Tympanic membrane, ear canal and external ear normal.      Left Ear: Tympanic membrane, ear canal and external ear normal.      Nose: Nose normal. No congestion or rhinorrhea. Mouth/Throat:      Mouth: Mucous membranes are moist.      Pharynx: No posterior oropharyngeal erythema. Eyes:      General: No scleral icterus. Right eye: No discharge. Left eye: No discharge. Extraocular Movements: Extraocular movements intact. Conjunctiva/sclera: Conjunctivae normal.      Pupils: Pupils are equal, round, and reactive to light. Comments: Cornea anterior chamber and iris are normal.   Neck:      Vascular: No carotid bruit. Trachea: No tracheal deviation. Cardiovascular:      Rate and Rhythm: Normal rate and regular rhythm. Pulses: Normal pulses. Heart sounds: Normal heart sounds. No murmur heard. No friction rub. No gallop. Pulmonary:      Effort: Pulmonary effort is normal. No respiratory distress. Breath sounds: Wheezing and rhonchi present. No rales. Chest:      Chest wall: No tenderness. Abdominal:      General: Bowel sounds are normal. There is no distension. Palpations: Abdomen is soft. There is no mass. Tenderness: There is no abdominal tenderness. There is no guarding or rebound. Musculoskeletal:         General: No tenderness or deformity. Cervical back: Normal range of motion and neck supple. Lymphadenopathy:      Cervical: No cervical adenopathy. Skin:     General: Skin is warm and dry. Coloration: Skin is not pale. Findings: No erythema or rash. Neurological:      General: No focal deficit present. Mental Status: She is alert and oriented to person, place, and time. Cranial Nerves: No cranial nerve deficit. Motor: No abnormal muscle tone. Deep Tendon Reflexes: Reflexes are normal and symmetric. Reflexes normal.      Comments: Cranial nerves II through XII are intact sensory and motor. Biceps triceps knee and ankle DTRs are normal and symmetrical.   Psychiatric:         Mood and Affect: Mood normal.         Behavior: Behavior normal.         Thought Content: Thought content normal.         Judgment: Judgment normal.         ICD-10-CM ICD-9-CM    1. Mild intermittent reactive airway disease without complication  J18.92 645.02 Nebulizer Accessories kit      predniSONE (DELTASONE) 20 mg tablet      2.  Mild intermittent asthma without complication  W76.43 439.30 albuterol (PROVENTIL VENTOLIN) 2.5 mg /3 mL (0.083 %) nebu      3. JOHN (generalized anxiety disorder)  F41.1 300.02 ALPRAZolam (Xanax) 0.5 mg tablet      4. PTSD (post-traumatic stress disorder)  F43.10 309.81 ALPRAZolam (Xanax) 0.5 mg tablet      5. Acute left-sided thoracic back pain  M54.6 724.1 cyclobenzaprine (FLEXERIL) 10 mg tablet      6. Hypertension, essential  I10 401.9       7. Gastroesophageal reflux disease without esophagitis  K21.9 530.81           Orders Placed This Encounter    albuterol (PROVENTIL VENTOLIN) 2.5 mg /3 mL (0.083 %) nebu     Sig: 3 mL by Nebulization route every four (4) hours as needed for Wheezing. Dispense:  360 Each     Refill:  1    ALPRAZolam (Xanax) 0.5 mg tablet     Sig: Take 1 Tablet by mouth three (3) times daily as needed for Anxiety. Max Daily Amount: 1.5 mg. Indications: repeated episodes of anxiety     Dispense:  270 Tablet     Refill:  0    cyclobenzaprine (FLEXERIL) 10 mg tablet     Sig: TAKE 1 TABLET BY MOUTH 2 TIMES DAILY AS NEEDED FOR MUSCLE SPASM(S). Indications: muscle spasm     Dispense:  180 Tablet     Refill:  1    Nebulizer Accessories kit     Sig: Use every 4 hours as needed for wheezing     Dispense:  1 Kit     Refill:  2    predniSONE (DELTASONE) 20 mg tablet     Si tablets day for days 1 through 4, then 4 tablets on day 5, then 3 tablets on day 6, then 2 tablets on day 7, then 1 tablet on day 8. Dispense:  30 Tablet     Refill:  0       Follow-up and Dispositions    Return in about 6 months (around 2023), or if symptoms worsen or fail to improve.          Moreno Keen MD

## 2022-10-19 NOTE — PROGRESS NOTES
1. \"Have you been to the ER, urgent care clinic since your last visit? Hospitalized since your last visit? \" No    2. \"Have you seen or consulted any other health care providers outside of the [address] [place] [address] since your last visit? \" No     3. For patients aged 39-70: Has the patient had a colonoscopy / FIT/ Cologuard? No has upcoming apt      If the patient is female:    4. For patients aged 41-77: Has the patient had a mammogram within the past 2 years? Yes - no Care Gap present      5. For patients aged 21-65: Has the patient had a pap smear?  Yes - no Care Gap present

## 2022-10-27 ENCOUNTER — NURSE TRIAGE (OUTPATIENT)
Dept: OTHER | Facility: CLINIC | Age: 62
End: 2022-10-27

## 2022-10-27 NOTE — TELEPHONE ENCOUNTER
Location of patient: [address] [place] from [place] at [facility] with [employer]. Current Symptoms:   Reports BP today as 151/106    Intermittent chest pain under breast for \"seconds\" - pain does not occur every day    States has not missed any doses of blood pressure medication    Onset: 2 days ago;       Pain Severity: 2/10; Temperature: denies     Denies - shortness of breath / diaphoresis / headache / blurred vision / numbness or weakness on one side of the body    What has been tried: aspirin      Recommended disposition: See in Office Today    Care advice provided, patient verbalizes understanding; denies any other questions or concerns; instructed to call back for any new or worsening symptoms. Patient/Caller agrees with recommended disposition; writer provided warm transfer to [facility] for appointment scheduling    Attention Provider: Thank you for allowing me to participate in the care of your patient. The patient was connected to triage in response to information provided to the [facility]. Please do not respond through this encounter as the response is not directed to a shared pool.         Reason for Disposition   Systolic BP >= 182 OR Diastolic >= 146    Protocols used: Blood Pressure - High-ADULT-OH

## 2022-11-26 DIAGNOSIS — K21.9 GASTROESOPHAGEAL REFLUX DISEASE WITHOUT ESOPHAGITIS: ICD-10-CM

## 2022-11-26 DIAGNOSIS — F43.10 PTSD (POST-TRAUMATIC STRESS DISORDER): ICD-10-CM

## 2022-11-28 RX ORDER — PRAZOSIN HYDROCHLORIDE 2 MG/1
2 CAPSULE ORAL
Qty: 90 CAPSULE | Refills: 1 | Status: SHIPPED | OUTPATIENT
Start: 2022-11-28

## 2022-11-28 RX ORDER — PANTOPRAZOLE SODIUM 40 MG/1
TABLET, DELAYED RELEASE ORAL
Qty: 180 TABLET | Refills: 1 | Status: SHIPPED | OUTPATIENT
Start: 2022-11-28

## 2022-12-03 DIAGNOSIS — I10 HYPERTENSION, ESSENTIAL: ICD-10-CM

## 2022-12-05 RX ORDER — CARVEDILOL 12.5 MG/1
TABLET ORAL
Qty: 180 TABLET | Refills: 0 | Status: SHIPPED | OUTPATIENT
Start: 2022-12-05

## 2023-01-17 ENCOUNTER — OFFICE VISIT (OUTPATIENT)
Dept: FAMILY MEDICINE CLINIC | Age: 63
End: 2023-01-17
Payer: MEDICARE

## 2023-01-17 VITALS
OXYGEN SATURATION: 92 % | WEIGHT: 170.5 LBS | HEIGHT: 62 IN | TEMPERATURE: 97.4 F | SYSTOLIC BLOOD PRESSURE: 116 MMHG | RESPIRATION RATE: 18 BRPM | DIASTOLIC BLOOD PRESSURE: 71 MMHG | HEART RATE: 74 BPM | BODY MASS INDEX: 31.38 KG/M2

## 2023-01-17 DIAGNOSIS — F41.1 GAD (GENERALIZED ANXIETY DISORDER): ICD-10-CM

## 2023-01-17 DIAGNOSIS — Z00.00 MEDICARE ANNUAL WELLNESS VISIT, SUBSEQUENT: Primary | ICD-10-CM

## 2023-01-17 DIAGNOSIS — F43.10 PTSD (POST-TRAUMATIC STRESS DISORDER): ICD-10-CM

## 2023-01-17 DIAGNOSIS — K31.84 GASTROPARESIS: ICD-10-CM

## 2023-01-17 DIAGNOSIS — J45.20 MILD INTERMITTENT REACTIVE AIRWAY DISEASE WITHOUT COMPLICATION: ICD-10-CM

## 2023-01-17 DIAGNOSIS — I10 HYPERTENSION, ESSENTIAL: ICD-10-CM

## 2023-01-17 DIAGNOSIS — E78.5 DYSLIPIDEMIA: ICD-10-CM

## 2023-01-17 DIAGNOSIS — K21.9 GASTROESOPHAGEAL REFLUX DISEASE WITHOUT ESOPHAGITIS: ICD-10-CM

## 2023-01-17 PROCEDURE — G0439 PPPS, SUBSEQ VISIT: HCPCS | Performed by: FAMILY MEDICINE

## 2023-01-17 PROCEDURE — G9899 SCRN MAM PERF RSLTS DOC: HCPCS | Performed by: FAMILY MEDICINE

## 2023-01-17 PROCEDURE — G8427 DOCREV CUR MEDS BY ELIG CLIN: HCPCS | Performed by: FAMILY MEDICINE

## 2023-01-17 PROCEDURE — 3078F DIAST BP <80 MM HG: CPT | Performed by: FAMILY MEDICINE

## 2023-01-17 PROCEDURE — 3074F SYST BP LT 130 MM HG: CPT | Performed by: FAMILY MEDICINE

## 2023-01-17 PROCEDURE — G8510 SCR DEP NEG, NO PLAN REQD: HCPCS | Performed by: FAMILY MEDICINE

## 2023-01-17 PROCEDURE — 99214 OFFICE O/P EST MOD 30 MIN: CPT | Performed by: FAMILY MEDICINE

## 2023-01-17 PROCEDURE — 3017F COLORECTAL CA SCREEN DOC REV: CPT | Performed by: FAMILY MEDICINE

## 2023-01-17 PROCEDURE — G8417 CALC BMI ABV UP PARAM F/U: HCPCS | Performed by: FAMILY MEDICINE

## 2023-01-17 RX ORDER — ONDANSETRON 8 MG/1
8 TABLET, ORALLY DISINTEGRATING ORAL
Qty: 30 TABLET | Refills: 0 | Status: SHIPPED | OUTPATIENT
Start: 2023-01-17

## 2023-01-17 RX ORDER — ALPRAZOLAM 0.5 MG/1
0.5 TABLET ORAL
Qty: 270 TABLET | Refills: 0 | Status: SHIPPED | OUTPATIENT
Start: 2023-01-17

## 2023-01-17 RX ORDER — NITROGLYCERIN 0.4 MG/1
0.4 TABLET SUBLINGUAL
Qty: 100 TABLET | Refills: 2 | Status: SHIPPED | OUTPATIENT
Start: 2023-01-17

## 2023-01-17 NOTE — PROGRESS NOTES
1. \"Have you been to the ER, urgent care clinic since your last visit? Hospitalized since your last visit? \" No    2. \"Have you seen or consulted any other health care providers outside of the [address] [place] [address] since your last visit? \" No     3. For patients aged 39-70: Has the patient had a colonoscopy / FIT/ Cologuard? Yes - Care Gap present. Rooming MA/LPN to request most recent results      If the patient is female:    4. For patients aged 41-77: Has the patient had a mammogram within the past 2 years? Yes - no Care Gap present      5. For patients aged 21-65: Has the patient had a pap smear? Yes - no Care Gap presentThis is the Subsequent Medicare Annual Wellness Exam, performed 12 months or more after the Initial AWV or the last Subsequent AWV    I have reviewed the patient's medical history in detail and updated the computerized patient record. Assessment/Plan   Education and counseling provided:  Are appropriate based on today's review and evaluation  End-of-Life planning (with patient's consent)  Screening Mammography  Colorectal cancer screening tests    1. Medicare annual wellness visit, subsequent  2. Hypertension, essential  3. Gastroesophageal reflux disease without esophagitis  -     nitroglycerin (NITROSTAT) 0.4 mg SL tablet; 1 Tablet by SubLINGual route every five (5) minutes as needed for Chest Pain. Up to 3 doses. 0.4 mg by SubLINGual route every five (5) minutes as needed for Chest Pain. Up to 3 doses. , Normal, Disp-100 Tablet, R-2  4. Mild intermittent reactive airway disease without complication  5. JOHN (generalized anxiety disorder)  -     ALPRAZolam (Xanax) 0.5 mg tablet; Take 1 Tablet by mouth three (3) times daily as needed for Anxiety. Max Daily Amount: 1.5 mg. Indications: repeated episodes of anxiety, Normal, Disp-270 Tablet, R-0  6. Dyslipidemia  7. PTSD (post-traumatic stress disorder)  -     ALPRAZolam (Xanax) 0.5 mg tablet;  Take 1 Tablet by mouth three (3) times daily as needed for Anxiety. Max Daily Amount: 1.5 mg. Indications: repeated episodes of anxiety, Normal, Disp-270 Tablet, R-0  8. Gastroparesis  -     ondansetron (ZOFRAN ODT) 8 mg disintegrating tablet; Take 1 Tablet by mouth every eight (8) hours as needed for Nausea., Normal, Disp-30 Tablet, R-0     Depression Risk Factor Screening     3 most recent PHQ Screens 1/17/2023   Little interest or pleasure in doing things Not at all   Feeling down, depressed, irritable, or hopeless Several days   Total Score PHQ 2 1       Alcohol & Drug Abuse Risk Screen    Do you average more than 1 drink per night or more than 7 drinks a week:  No    On any one occasion in the past three months have you have had more than 3 drinks containing alcohol:  No          Functional Ability and Level of Safety    Hearing: Hearing is good. Activities of Daily Living: The home contains: HOMEOSTASIS LABS  Patient does total self care    Functional Ability:   Does the patient exhibit a steady gait? yes   How long did it take the patient to get up and walk from a sitting position? 4sec   Is the patient self reliant?  (ie can do own laundry, meals, household chores)  yes     Does the patient handle his/her own medications? yes     Does the patient handle his/her own money? yes     Is the patients home safe (ie good lighting, handrails on stairs and bath, etc.)? yes     Did you notice or did patient express any hearing difficulties? no     Did you notice or did patient express any vision difficulties?   no     Were distance and reading eye charts used? no       Advance Care Planning:   Patient was offered the opportunity to discuss advance care planning:  yes     Does patient have an Advance Directive:  yes   If no, did you provide information on Caring Connections?   no     ADL Assessment 1/17/2023   Feeding yourself No Help Needed   Getting from bed to chair No Help Needed   Getting dressed No Help Needed   Bathing or showering No Help Needed Walk across the room (includes cane/walker) No Help Needed   Using the telphone No Help Needed   Taking your medications No Help Needed   Preparing meals No Help Needed   Managing money (expenses/bills) No Help Needed   Moderately strenuous housework (laundry) No Help Needed   Shopping for personal items (toiletries/medicines) No Help Needed   Shopping for groceries No Help Needed   Driving No Help Needed   Climbing a flight of stairs No Help Needed   Getting to places beyond walking distances No Help Needed       Abuse Screening Questionnaire 1/17/2023   Do you ever feel afraid of your partner? N   Are you in a relationship with someone who physically or mentally threatens you? N   Is it safe for you to go home? Y           Ambulation: with no difficulty     Fall Risk:  Fall Risk Assessment, last 12 mths 2/10/2021   Able to walk? Yes   Fall in past 12 months? 0   Do you feel unsteady? 0   Are you worried about falling 0   Number of falls in past 12 months -   Fall with injury?  -      Abuse Screen:  Patient is not abused       Cognitive Screening    Has your family/caregiver stated any concerns about your memory: no     Cognitive Screening: Normal - orientedx4    Health Maintenance Due     Health Maintenance Due   Topic Date Due    Colorectal Cancer Screening Combo  Never done       Patient Care Team   Patient Care Team:  Esperanza Shields MD as PCP - General (Family Medicine)  Esperanza Shields MD as PCP - [facility]  Denise Hong MD (Cardiovascular Disease Physician)    History     Patient Active Problem List   Diagnosis Code    Reactive airway disease J45.909    Smoker F17.200    JOHN (generalized anxiety disorder) F41.1    Gastroesophageal reflux disease without esophagitis K21.9    Hypertension, essential I10    Dyslipidemia E78.5    PTSD (post-traumatic stress disorder) F43.10    Chronic bilateral back pain M54.9, G89.29    Chronic pain of both shoulders M25.511, G89.29, M25.512 Past Medical History:   Diagnosis Date    Arthritis     Cancer ([facility] [address]     ovarian    Chronic HCV with state 2 hepatic fibrosis ([facility] [address]     Dyslipidemia     Gastroparesis     Lymph edema     Menopause     Precordial pain 1/30/2019    Psychiatric disorder     anxiety    Umbilical discharge 0/7/5711      Past Surgical History:   Procedure Laterality Date    HX APPENDECTOMY      HX BREAST BIOPSY Left     benign    HX CHOLECYSTECTOMY      HX GYN      total hysterectomy    HX HYSTERECTOMY      HX OOPHORECTOMY       Current Outpatient Medications   Medication Sig Dispense Refill    ALPRAZolam (Xanax) 0.5 mg tablet Take 1 Tablet by mouth three (3) times daily as needed for Anxiety. Max Daily Amount: 1.5 mg. Indications: repeated episodes of anxiety 270 Tablet 0    ondansetron (ZOFRAN ODT) 8 mg disintegrating tablet Take 1 Tablet by mouth every eight (8) hours as needed for Nausea. 30 Tablet 0    nitroglycerin (NITROSTAT) 0.4 mg SL tablet 1 Tablet by SubLINGual route every five (5) minutes as needed for Chest Pain. Up to 3 doses. 0.4 mg by SubLINGual route every five (5) minutes as needed for Chest Pain. Up to 3 doses. 100 Tablet 2    hydroCHLOROthiazide (HYDRODIURIL) 25 mg tablet TAKE 1 TABLET BY MOUTH EVERY DAY 30 Tablet 3    carvediloL (COREG) 12.5 mg tablet TAKE 1 TABLET BY MOUTH TWO TIMES A DAY. 180 Tablet 0    pantoprazole (PROTONIX) 40 mg tablet TAKE 1 TABLET BY MOUTH TWO TIMES A DAY. 180 Tablet 1    prazosin (MINIPRESS) 2 mg capsule TAKE 1 CAPSULE BY MOUTH NIGHTLY. INDICATIONS: POSTTRAUMATIC STRESS SYNDROME 90 Capsule 1    albuterol (PROVENTIL VENTOLIN) 2.5 mg /3 mL (0.083 %) nebu 3 mL by Nebulization route every four (4) hours as needed for Wheezing. 360 Each 1    cyclobenzaprine (FLEXERIL) 10 mg tablet TAKE 1 TABLET BY MOUTH 2 TIMES DAILY AS NEEDED FOR MUSCLE SPASM(S).   Indications: muscle spasm 180 Tablet 1    Nebulizer Accessories kit Use every 4 hours as needed for wheezing 1 Kit 2    predniSONE (DELTASONE) 20 mg tablet 5 tablets day for days 1 through 4, then 4 tablets on day 5, then 3 tablets on day 6, then 2 tablets on day 7, then 1 tablet on day 8. 30 Tablet 0    albuterol (PROVENTIL HFA, VENTOLIN HFA, PROAIR HFA) 90 mcg/actuation inhaler INHALE 2 PUFFS BY MOUTH EVERY 4 HOURS AS NEEDED FOR WHEEZE 6.7 Each 5    gabapentin (NEURONTIN) 600 mg tablet Take 1 Tablet by mouth three (3) times daily. Max Daily Amount: 1,800 mg. 270 Tablet 0    Nebulizer & Compressor machine Use q 4 hours as needed for dyspnea or wheezing 1 Each 0    dronabinoL (MARINOL) 5 mg capsule Take 5 mg by mouth as needed. aspirin (ASPIRIN) 325 mg tablet Take 325 mg by mouth daily. Allergies   Allergen Reactions    Ampicillin Anaphylaxis    Codeine Anaphylaxis    Morphine Anaphylaxis    Penicillins Anaphylaxis       Family History   Problem Relation Age of Onset    Breast Cancer Sister         dx age 29's    No Known Problems Mother      Social History     Tobacco Use    Smoking status: Some Days     Packs/day: 2.00     Years: 45.00     Pack years: 90.00     Types: Cigarettes    Smokeless tobacco: Never    Tobacco comments:     Patient has cut down to 5 cigarettes a day   Substance Use Topics    Alcohol use: No         Ragena Dupre, LPN Sharman Phalen MD    Maribell Gregory is a 60 y.o. female who presents with the following:  Chief Complaint   Patient presents with    Annual Wellness Visit    Anxiety    Cholesterol Problem    Hypertension    GERD    Asthma       Patient's hypertension is doing well on current medications she states she has plenty is for several months the [address] [place] [address] Avenue was sending her 3 months each month. She finally realized she had to send an email to them to stop sending the packages. Patient's gastroesophageal reflux disease is doing well but she does need a nitroglycerin refill which she needs to use when it this is acting up.   Patient does have reactive airway disease which is aggravated by her smoking and recently she got what probably was respiratory syncytial virus as her grandchildren she was babysitting had the disease. Patient had generalized anxiety disorder and PTSD and needs a refill of her alprazolam.  Patient is not showing any aberrant behavior nor any signs of diversion and is doing well on the medication which is keeping her functional.  Patient does have dyslipidemia for which she is currently on no particular medication but has been working on diet and has actually lost a few pounds. Allergies   Allergen Reactions    Ampicillin Anaphylaxis    Codeine Anaphylaxis    Morphine Anaphylaxis    Penicillins Anaphylaxis       Current Outpatient Medications   Medication Sig    ALPRAZolam (Xanax) 0.5 mg tablet Take 1 Tablet by mouth three (3) times daily as needed for Anxiety. Max Daily Amount: 1.5 mg. Indications: repeated episodes of anxiety    ondansetron (ZOFRAN ODT) 8 mg disintegrating tablet Take 1 Tablet by mouth every eight (8) hours as needed for Nausea. nitroglycerin (NITROSTAT) 0.4 mg SL tablet 1 Tablet by SubLINGual route every five (5) minutes as needed for Chest Pain. Up to 3 doses. 0.4 mg by SubLINGual route every five (5) minutes as needed for Chest Pain. Up to 3 doses. hydroCHLOROthiazide (HYDRODIURIL) 25 mg tablet TAKE 1 TABLET BY MOUTH EVERY DAY    carvediloL (COREG) 12.5 mg tablet TAKE 1 TABLET BY MOUTH TWO TIMES A DAY. pantoprazole (PROTONIX) 40 mg tablet TAKE 1 TABLET BY MOUTH TWO TIMES A DAY. prazosin (MINIPRESS) 2 mg capsule TAKE 1 CAPSULE BY MOUTH NIGHTLY. INDICATIONS: POSTTRAUMATIC STRESS SYNDROME    albuterol (PROVENTIL VENTOLIN) 2.5 mg /3 mL (0.083 %) nebu 3 mL by Nebulization route every four (4) hours as needed for Wheezing. cyclobenzaprine (FLEXERIL) 10 mg tablet TAKE 1 TABLET BY MOUTH 2 TIMES DAILY AS NEEDED FOR MUSCLE SPASM(S).   Indications: muscle spasm    Nebulizer Accessories kit Use every 4 hours as needed for wheezing    predniSONE (DELTASONE) 20 mg tablet 5 tablets day for days 1 through 4, then 4 tablets on day 5, then 3 tablets on day 6, then 2 tablets on day 7, then 1 tablet on day 8.    albuterol (PROVENTIL HFA, VENTOLIN HFA, PROAIR HFA) 90 mcg/actuation inhaler INHALE 2 PUFFS BY MOUTH EVERY 4 HOURS AS NEEDED FOR WHEEZE    gabapentin (NEURONTIN) 600 mg tablet Take 1 Tablet by mouth three (3) times daily. Max Daily Amount: 1,800 mg. Nebulizer & Compressor machine Use q 4 hours as needed for dyspnea or wheezing    dronabinoL (MARINOL) 5 mg capsule Take 5 mg by mouth as needed. aspirin (ASPIRIN) 325 mg tablet Take 325 mg by mouth daily. No current facility-administered medications for this visit. Past Medical History:   Diagnosis Date    Arthritis     Cancer ([facility] [address]     ovarian    Chronic HCV with state 2 hepatic fibrosis ([facility])     Dyslipidemia     Gastroparesis     Lymph edema     Menopause     Precordial pain 1/30/2019    Psychiatric disorder     anxiety    Umbilical discharge 5/2/6198       Past Surgical History:   Procedure Laterality Date    HX APPENDECTOMY      HX BREAST BIOPSY Left     benign    HX CHOLECYSTECTOMY      HX GYN      total hysterectomy    HX HYSTERECTOMY      HX OOPHORECTOMY         Family History   Problem Relation Age of Onset    Breast Cancer Sister         dx age 29's    No Known Problems Mother        Social History     Socioeconomic History    Marital status:    Tobacco Use    Smoking status: Some Days     Packs/day: 2.00     Years: 45.00     Pack years: 90.00     Types: Cigarettes    Smokeless tobacco: Never    Tobacco comments:     Patient has cut down to 5 cigarettes a day   Substance and Sexual Activity    Alcohol use: No    Drug use: No       Review of Systems   Constitutional:  Negative for chills, fever, malaise/fatigue and weight loss. HENT:  Negative for congestion, hearing loss, sore throat and tinnitus. Eyes:  Negative for blurred vision, pain and discharge.    Respiratory:  Negative for cough, shortness of breath and wheezing. Cardiovascular:  Negative for chest pain, palpitations, orthopnea, claudication and leg swelling. Gastrointestinal:  Negative for abdominal pain, constipation and heartburn. Genitourinary:  Negative for dysuria, frequency and urgency. Musculoskeletal:  Negative for falls, joint pain and myalgias. Skin:  Negative for itching and rash. Neurological:  Negative for dizziness, tingling, tremors and headaches. Endo/Heme/Allergies:  Negative for environmental allergies and polydipsia. Psychiatric/Behavioral:  Negative for depression and substance abuse. The patient is not nervous/anxious. Visit Vitals  /71 (BP 1 Location: Left upper arm)   Pulse 74   Temp 97.4 °F (36.3 °C) (Temporal)   Resp 18   Ht 5' 2\" (1.575 m)   Wt 170 lb 8 oz (77.3 kg)   SpO2 92%   BMI 31.18 kg/m²     Physical Exam  Vitals reviewed. Constitutional:       General: She is not in acute distress. Appearance: Normal appearance. She is well-developed. She is obese. She is not ill-appearing. HENT:      Head: Normocephalic and atraumatic. Right Ear: Tympanic membrane, ear canal and external ear normal.      Left Ear: Tympanic membrane, ear canal and external ear normal.      Nose: Nose normal. No congestion or rhinorrhea. Mouth/Throat:      Mouth: Mucous membranes are moist.      Pharynx: No posterior oropharyngeal erythema. Eyes:      General:         Right eye: No discharge. Left eye: No discharge. Extraocular Movements: Extraocular movements intact. Conjunctiva/sclera: Conjunctivae normal.      Pupils: Pupils are equal, round, and reactive to light. Comments: Cornea anterior chamber and iris are normal.   Neck:      Vascular: No carotid bruit. Trachea: No tracheal deviation. Cardiovascular:      Rate and Rhythm: Normal rate and regular rhythm. Pulses: Normal pulses. Heart sounds: Normal heart sounds. No murmur heard. No friction rub. No gallop. Pulmonary:      Effort: Pulmonary effort is normal. No respiratory distress. Breath sounds: Normal breath sounds. No wheezing or rhonchi. Chest:      Chest wall: No tenderness. Abdominal:      General: Bowel sounds are normal. There is no distension. Palpations: Abdomen is soft. There is no mass. Tenderness: There is no abdominal tenderness. There is no guarding or rebound. Musculoskeletal:         General: No swelling, tenderness or deformity. Cervical back: Normal range of motion and neck supple. Right lower leg: No edema. Left lower leg: No edema. Lymphadenopathy:      Cervical: No cervical adenopathy. Skin:     General: Skin is warm and dry. Coloration: Skin is not pale. Findings: No erythema or rash. Neurological:      General: No focal deficit present. Mental Status: She is alert and oriented to person, place, and time. Cranial Nerves: No cranial nerve deficit. Sensory: No sensory deficit. Motor: No abnormal muscle tone. Deep Tendon Reflexes: Reflexes are normal and symmetric. Reflexes normal.      Comments: Cranial nerves II through XII are intact sensory and motor. Biceps triceps knee and ankle DTRs are normal and symmetrical.   Psychiatric:         Mood and Affect: Mood normal.         Behavior: Behavior normal.         Thought Content: Thought content normal.         Judgment: Judgment normal.         ICD-10-CM ICD-9-CM    1. Medicare annual wellness visit, subsequent  Z00.00 V70.0       2. Hypertension, essential  I10 401.9       3. Gastroesophageal reflux disease without esophagitis  K21.9 530.81 nitroglycerin (NITROSTAT) 0.4 mg SL tablet      4. Mild intermittent reactive airway disease without complication  L70.89 754.38       5. JOHN (generalized anxiety disorder)  F41.1 300.02 ALPRAZolam (Xanax) 0.5 mg tablet      6. Dyslipidemia  E78.5 272.4       7.  PTSD (post-traumatic stress disorder)  F43.10 309.81 ALPRAZolam (Xanax) 0.5 mg tablet      8. Gastroparesis  K31.84 536.3 ondansetron (ZOFRAN ODT) 8 mg disintegrating tablet          Orders Placed This Encounter    ALPRAZolam (Xanax) 0.5 mg tablet     Sig: Take 1 Tablet by mouth three (3) times daily as needed for Anxiety. Max Daily Amount: 1.5 mg. Indications: repeated episodes of anxiety     Dispense:  270 Tablet     Refill:  0    ondansetron (ZOFRAN ODT) 8 mg disintegrating tablet     Sig: Take 1 Tablet by mouth every eight (8) hours as needed for Nausea. Dispense:  30 Tablet     Refill:  0    nitroglycerin (NITROSTAT) 0.4 mg SL tablet     Si Tablet by SubLINGual route every five (5) minutes as needed for Chest Pain. Up to 3 doses. 0.4 mg by SubLINGual route every five (5) minutes as needed for Chest Pain. Up to 3 doses. Dispense:  100 Tablet     Refill:  2       Follow-up and Dispositions    Return in about 3 months (around 2023), or if symptoms worsen or fail to improve.          Piedad Wilcox MD

## 2023-01-17 NOTE — PATIENT INSTRUCTIONS
Medicare Wellness Visit, Female     The best way to live healthy is to have a lifestyle where you eat a well-balanced diet, exercise regularly, limit alcohol use, and quit all forms of tobacco/nicotine, if applicable. Regular preventive services are another way to keep healthy. Preventive services (vaccines, screening tests, monitoring & exams) can help personalize your care plan, which helps you manage your own care. Screening tests can find health problems at the earliest stages, when they are easiest to treat. [facility] follows the current, evidence-based guidelines published by the [facility] ([facility]) when recommending preventive services for our patients. Because we follow these guidelines, sometimes recommendations change over time as research supports it. (For example, mammograms used to be recommended annually. Even though Medicare will still pay for an annual mammogram, the newer guidelines recommend a mammogram every two years for women of average risk). Of course, you and your doctor may decide to screen more often for some diseases, based on your risk and your co-morbidities (chronic disease you are already diagnosed with). Preventive services for you include:  - Medicare offers their members a free annual wellness visit, which is time for you and your primary care provider to discuss and plan for your preventive service needs.  Take advantage of this benefit every year!    -Over the age of 72 should receive the recommended pneumonia vaccines.    -All adults should have a flu vaccine yearly.  -All adults should have a tetanus vaccine every 10 years.   -Over the age 48 should receive the shingles vaccines.        -All adults should be screened once for Hepatitis C.  -All adults age 38-68 who are overweight should have a diabetes screening test once every three years.   -Other screening tests and preventive services for persons with diabetes include: an eye exam to screen for diabetic retinopathy, a kidney function test, a foot exam, and stricter control over your cholesterol.   -Cardiovascular screening for adults with routine risk involves an electrocardiogram (ECG) at intervals determined by your doctor.     -Colorectal cancer screenings should be done for adults age 39-70 with no increased risk factors for colorectal cancer. There are a number of acceptable methods of screening for this type of cancer. Each test has its own benefits and drawbacks. Discuss with your doctor what is most appropriate for you during your annual wellness visit. The different tests include: colonoscopy (considered the best screening method), a fecal occult blood test, a fecal DNA test, and sigmoidoscopy.    -Lung cancer screening is recommended annually with a low dose CT scan for adults between age 54 and 68, who have smoked at least 30 pack years (equivalent of 1 pack per day for 30 days), and who is a current smoker or quit less than 15 years ago.    -A bone mass density test is recommended when a woman turns 65 to screen for osteoporosis. This test is only recommended one time, as a screening. Some providers will use this same test as a disease monitoring tool if you already have osteoporosis. -Breast cancer screenings are recommended every other year for women of normal risk, age 54-69.    -Cervical cancer screenings for women over age 72 are only recommended with certain risk factors.      Here is a list of your current Health Maintenance items (your personalized list of preventive services) with a due date:  Health Maintenance Due   Topic Date Due    Colorectal Screening  Never done

## 2023-03-08 DIAGNOSIS — K31.84 GASTROPARESIS: ICD-10-CM

## 2023-03-08 DIAGNOSIS — I10 HYPERTENSION, ESSENTIAL: ICD-10-CM

## 2023-03-08 RX ORDER — CARVEDILOL 12.5 MG/1
TABLET ORAL
Qty: 180 TABLET | Refills: 0 | Status: SHIPPED | OUTPATIENT
Start: 2023-03-08

## 2023-03-08 RX ORDER — ONDANSETRON 8 MG/1
TABLET, ORALLY DISINTEGRATING ORAL
Qty: 30 TABLET | Refills: 0 | Status: SHIPPED | OUTPATIENT
Start: 2023-03-08

## 2023-04-13 ENCOUNTER — HOSPITAL ENCOUNTER (EMERGENCY)
Age: 63
Discharge: HOME | End: 2023-04-13
Attending: EMERGENCY MEDICINE
Payer: MEDICARE

## 2023-04-13 ENCOUNTER — APPOINTMENT (OUTPATIENT)
Dept: GENERAL RADIOLOGY | Age: 63
End: 2023-04-13
Attending: EMERGENCY MEDICINE
Payer: MEDICARE

## 2023-04-13 VITALS
BODY MASS INDEX: 31.28 KG/M2 | HEART RATE: 80 BPM | WEIGHT: 170 LBS | HEIGHT: 62 IN | SYSTOLIC BLOOD PRESSURE: 130 MMHG | RESPIRATION RATE: 18 BRPM | TEMPERATURE: 98.6 F | OXYGEN SATURATION: 100 % | DIASTOLIC BLOOD PRESSURE: 79 MMHG

## 2023-04-13 DIAGNOSIS — S82.839A AVULSION FRACTURE OF DISTAL FIBULA: Primary | ICD-10-CM

## 2023-04-13 DIAGNOSIS — S93.402A SPRAIN OF LEFT ANKLE, UNSPECIFIED LIGAMENT, INITIAL ENCOUNTER: ICD-10-CM

## 2023-04-13 PROCEDURE — 73610 X-RAY EXAM OF ANKLE: CPT

## 2023-04-13 PROCEDURE — 99283 EMERGENCY DEPT VISIT LOW MDM: CPT

## 2023-04-13 PROCEDURE — 74011250637 HC RX REV CODE- 250/637: Performed by: EMERGENCY MEDICINE

## 2023-04-13 RX ORDER — NAPROXEN 250 MG/1
500 TABLET ORAL ONCE
Status: COMPLETED | OUTPATIENT
Start: 2023-04-13 | End: 2023-04-13

## 2023-04-13 RX ADMIN — NAPROXEN 500 MG: 250 TABLET ORAL at 10:29

## 2023-04-13 NOTE — ED NOTES
I have reviewed discharge instructions with the patient. The patient verbalized understanding. Discharge medications discussed with patient. No questions at this time.  To car with son via wheelchair

## 2023-04-13 NOTE — ED PROVIDER NOTES
EMERGENCY DEPARTMENT HISTORY AND PHYSICAL EXAM          Date: 4/13/2023  Patient Name: German Layton    History of Presenting Illness     Chief Complaint   Patient presents with    Ankle Injury       History Provided By: Patient    HPI: German Layton is a 60 y.o. female, pmhx listed below, who presents to the ED c/o ankle injury. Patient reports that she twisted her ankle 2 days ago, fell onto her knees during the fall. Reports that she scraped her knees and now is not having knee pain, only left ankle swelling. Reports she tried to stay off it yesterday but continues to have pain that now radiates into her foot. Reports she has anaphylaxis to opiate medications, has taken her regular gabapentin as well as ibuprofen at home with minimal relief. PCP: Nikko Mahajan MD        Past History       Past Medical History:  Past Medical History:   Diagnosis Date    Arthritis     Cancer ([facility] [address]     ovarian    Chronic HCV with state 2 hepatic fibrosis ([facility] [address]     Dyslipidemia     Gastroparesis     Lymph edema     Menopause     Precordial pain 1/30/2019    Psychiatric disorder     anxiety    Umbilical discharge 8/3/3020       Past Surgical History:  Past Surgical History:   Procedure Laterality Date    HX APPENDECTOMY      HX BREAST BIOPSY Left     benign    HX CHOLECYSTECTOMY      HX GYN      total hysterectomy    HX HYSTERECTOMY      HX OOPHORECTOMY         Family History:  Family History   Problem Relation Age of Onset    Breast Cancer Sister         dx age 29's    No Known Problems Mother        Social History:  Social History     Tobacco Use    Smoking status: Some Days     Packs/day: 2.00     Years: 45.00     Pack years: 90.00     Types: Cigarettes    Smokeless tobacco: Never    Tobacco comments:     Patient has cut down to 5 cigarettes a day   Substance Use Topics    Alcohol use: No    Drug use: No       Physical Exam     Vital Signs-Reviewed the patient's vital signs.   Patient Vitals for the past 12 hrs:   Temp Pulse Resp BP SpO2   04/13/23 0933 98.6 °F (37 °C) 80 18 130/79 100 %       Physical Exam  Constitutional:       Appearance: Normal appearance. HENT:      Head: Normocephalic and atraumatic. Mouth/Throat:      Mouth: Mucous membranes are moist.   Musculoskeletal:      Comments: Ankle ecchymosis and swelling especially around lateral malleolus with distal malleoli tenderness. No medial malleolar tenderness. Foot nontender, no deformity. Normal DP pulse. Normal range of motion of all toes and foot, increased pain with any range of motion of ankle. Bilateral knees nontender. Anterior abrasions noted. Skin:     General: Skin is warm and dry. Neurological:      Mental Status: She is alert and oriented to person, place, and time. Psychiatric:         Mood and Affect: Mood normal.       Diagnostic Study Results     Labs -   No results found for this or any previous visit (from the past 12 hour(s)). Radiologic Studies -   XR ANKLE LT MIN 3 V   Final Result   Possible subtle avulsion deformity at the tip of the lateral   malleolus, with overlying soft tissue swelling. CT Results  (Last 48 hours)      None          CXR Results  (Last 48 hours)      None                Medical Decision Making   I am the first provider for this patient. I reviewed the vital signs, available nursing notes, past medical history, past surgical history, family history and social history. Records Reviewed: Prior medical records    Provider Notes (Medical Decision Making):   MDM: 58-year-old female with ankle injury, sprain versus fracture. Will obtain x-ray now. Bilateral knees are nontender, low suspicion for knee fracture. Initial assessment performed. The patients presenting problems have been discussed, and they are in agreement with the care plan formulated and outlined with them. I have encouraged them to ask questions as they arise throughout their visit.     PROGRESS NOTE:   Distal avulsion fracture of fibula. Will place in boot. Discharge note:  Pt re-evaluated and noted to be feeling better, ready for discharge. Updated pt on all final results. Will follow up as instructed. All questions have been answered, pt voiced understanding and agreement with plan. Specific return precautions provided as well as instructions to return to the ED should sx worsen at any time. Vital signs stable for discharge. Diagnosis     Clinical Impression:   1. Avulsion fracture of distal fibula    2. Sprain of left ankle, unspecified ligament, initial encounter            Disposition:  Discharged    Current Discharge Medication List            Please note, this dictation was completed with [employer], the computer voice recognition software. Quite often unanticipated grammatical, syntax, homophones, and other interpretive errors are inadvertently transcribed by the computer software. Please disregard these errors. Please excuse any errors that have escaped final proof reading.

## 2023-04-13 NOTE — ED TRIAGE NOTES
Pt had mechanical fall last night and reports left ankle pain and swelling. Unable to bear weight. Has tried Aleve and ice.

## 2023-04-18 ENCOUNTER — OFFICE VISIT (OUTPATIENT)
Dept: SURGERY | Age: 63
End: 2023-04-18

## 2023-04-18 VITALS
TEMPERATURE: 97.8 F | WEIGHT: 170 LBS | RESPIRATION RATE: 18 BRPM | OXYGEN SATURATION: 92 % | BODY MASS INDEX: 31.28 KG/M2 | DIASTOLIC BLOOD PRESSURE: 78 MMHG | HEIGHT: 62 IN | HEART RATE: 68 BPM | SYSTOLIC BLOOD PRESSURE: 121 MMHG

## 2023-04-18 DIAGNOSIS — S82.62XA CLOSED FRACTURE OF DISTAL LATERAL MALLEOLUS OF LEFT FIBULA, INITIAL ENCOUNTER: Primary | ICD-10-CM

## 2023-04-18 NOTE — PROGRESS NOTES
Identified pt with two pt identifiers (name and ). Reviewed chart in preparation for visit and have obtained necessary documentation. Nory Newsome is a 60 y.o. female  Chief Complaint   Patient presents with    Follow-up     Fx to left tibia/fibula     Visit Vitals  /78 (BP 1 Location: Left arm, BP Patient Position: Sitting, BP Cuff Size: Large adult)   Pulse 68   Temp 97.8 °F (36.6 °C) (Temporal)   Resp 18   Ht 5' 2\" (1.575 m)   Wt 170 lb (77.1 kg)   SpO2 92%   BMI 31.09 kg/m²       1. Have you been to the ER, urgent care clinic since your last visit? Hospitalized since your last visit? No    2. Have you seen or consulted any other health care providers outside of the [address] [place] [address] since your last visit? Include any pap smears or colon screening.  No

## 2023-04-18 NOTE — PROGRESS NOTES
Patient comes in today for evaluation of her left ankle injury. She states she fell in a hole 4 days ago. She has had pain since that time. Physical exam: Alert and orient x3. No mood and affect. Judgment intact. No acute cardiopulmonary distress no labored breathing. Bilateral lower extremity skin neurological but examinations are within normal limits. Range of motion left ankle is nearly symmetric contralateral side. There is no obvious ligamentous instability. There is some lateral based swelling. She is minimally nontender over the bone. Radiographs: I have reviewed and interpreted patient's radiographs well as reported in the radiologist. She does appear to have a small avulsion fracture of the lateral malleolus. Impression: Small left avulsion fracture of the lateral malleolus    Medical decision making: I discussed the patient treatment options operative and nonoperative. I recommend nonoperative care. She will continue to weight-bear as tolerated in the boot which she has already been doing. She will maintain the boot at all times. We will see her back in 4 weeks to reevaluate her progress. If she is doing well we will likely consider transition her to a lace up brace.

## 2023-04-21 ENCOUNTER — OFFICE VISIT (OUTPATIENT)
Dept: HEMATOLOGY | Age: 63
End: 2023-04-21

## 2023-04-21 VITALS
OXYGEN SATURATION: 92 % | BODY MASS INDEX: 29.44 KG/M2 | WEIGHT: 160 LBS | HEIGHT: 62 IN | SYSTOLIC BLOOD PRESSURE: 121 MMHG | HEART RATE: 68 BPM | DIASTOLIC BLOOD PRESSURE: 90 MMHG | TEMPERATURE: 97.5 F

## 2023-04-21 DIAGNOSIS — Z86.19 HEPATITIS C VIRUS INFECTION CURED AFTER ANTIVIRAL DRUG THERAPY: ICD-10-CM

## 2023-04-21 DIAGNOSIS — K76.0 FATTY LIVER: ICD-10-CM

## 2023-04-21 PROBLEM — J45.909 REACTIVE AIRWAY DISEASE: Status: RESOLVED | Noted: 2018-09-19 | Resolved: 2023-04-21

## 2023-04-21 NOTE — PROGRESS NOTES
[address] [facility] [address], MD, FACP, Cite Judith Brittany, [place]      Lonnie Gan, PA-KOFI Patel S Daphne, AGPCNP-BC   Alvin Navarro, [facility]-   Rafaela Keller, FNP-C  Seven Werner, FNP-C   Tulio Shankar, AGPCNP-BC      [address]   at [address], [address]   [phone]   FAX: [address]   at [facility]   [address], [address]   [place], [address] [place] [address] Street - [address]   [phone]   FAX: [phone]       Patient Care Team:  Keli Oleary MD as PCP - General (Family Medicine)  Keli Oleary MD as PCP - [address] [place] [address]  [facility]  Eugenia Lao MD (Cardiovascular Disease Physician)  Erick Gillespie MD as Surgeon (Orthopedic Surgery)      Problem List  Date Reviewed: 1/17/2023            Codes Class Noted    Chronic pain of both shoulders ICD-10-CM: M25.511, G89.29, M25.512  ICD-9-CM: 719.41, 338.29  5/11/2021        PTSD (post-traumatic stress disorder) ICD-10-CM: F43.10  ICD-9-CM: 309.81  3/18/2020        Chronic bilateral back pain ICD-10-CM: M54.9, G89.29  ICD-9-CM: 724.5, 338.29  3/18/2020        Dyslipidemia ICD-10-CM: E78.5  ICD-9-CM: 272.4  1/30/2019        Reactive airway disease ICD-10-CM: J45.909  ICD-9-CM: 493.90  9/19/2018        Smoker ICD-10-CM: F17.200  ICD-9-CM: 305.1  9/19/2018        JOHN (generalized anxiety disorder) ICD-10-CM: F41.1  ICD-9-CM: 300.02  9/19/2018        Gastroesophageal reflux disease without esophagitis ICD-10-CM: K21.9  ICD-9-CM: 530.81  9/19/2018        Hypertension, essential ICD-10-CM: I10  ICD-9-CM: 401.9  9/19/2018           The clinicians listed above have asked me to see Chris Graves in consultation regarding chronic HCV and its management.   All medical records sent by the referring physicians were reviewed     The patient is a   60 y.o.   female who was found to have abnormalities in liver chemistries and subsequently tested positive for chronic HCV   in 2015. Risk factors for acquiring HCV are   blood transfusions   in 1986. Treated DE. Mavyret SVR  Check HCV DNA    Found to have fatty liver     Ultrasound   CT scan   MRI   of the liver   was   were    performed in ***/***. The results of the imaging   are not available at this time. The patient believes this   demonstrated a normal appearing liver. suggested chronic liver disease. suggested cirrhosis. suggested fatty liver disease. An assessment of liver fibrosis with biopsy or elastography   has not been performed. A liver biopsy   was performed in ***/***. This demonstrated   The results are not available at this time. The patient believes this demonstrated   no fibrosis. portal fibrosis. bridging fibrosis. cirrhosis. The patient   has never received treatment for chronic HCV.    was treated with   standard interferon   peginterferon   and ribavirin. Harvoni (sofasbuvir and ledipasvir)   Mavyret (glecaprevir and piprentasvir)  Zepetier (grazaprevir and elbasvir)  Epclusa (sofosbuvir and valpitasvir)   Vosevi (sofosbuvir, valpitasvir and voxalaprevir)   in ***. The patient was treated for   *** weeks. The patient   tolerated treatment reasonably well.    had significant side effects but was able to complete the recommended duration of treatment. tolerated treatment poorly and had to prematurely discontinue therapy. HCV RNA levels obtained during treatment   are not available at this time. The patient believes there was a   demonstrated a   null response. partial virologic response. response and breakthrough. response and relapse. sustained virologic response. The patient is unaware of the virologic response pattern. FS.  25, 5.8, 31%      In the office today the patient has the following symptoms:   The patient feels well and has no complaints. fatigue,   fevers,   chills,   shortness of breath,   chest pain,   pain in the right side over the liver,   diffuse abdominal pain,   nausea,   vomiting,   constipation,   diarrhea,   dry eyes,   dry mouth,   arthralgias,   myalgias,   yellowing of the eyes or skin,   itching,   dark urine,   problems concentrating,   swelling of the abdomen,   swelling of the lower extremities,   hematemesis,   hematochezia. The patient is not experiencing the following symptoms which are commonly seen with this liver disorder:   fatigue,   fevers,   chills,   shortness of breath,   chest pain,   pain in the right side over the liver,   diffuse abdominal pain,   nausea,   vomiting,   constipation,   diarrhea,   dry eyes,   dry mouth,   arthralgias,   myalgias,   yellowing of the eyes or skin,   itching,   dark urine,   problems concentrating,   swelling of the abdomen,   swelling of the lower extremities,   hematemesis,   hematochezia. The patient   completes all daily activities without any functional limitations. has   Mild   Moderate   Severe   limitations in functional activities which can be attributed to   the liver disease   and   to other medical problems that are not related to the liver disease. ASSESSMENT AND PLAN:  Chronic HCV   Chronic HCV   of unclear severity. with   no fibrosis. cirrhosis. stage *** fibrosis by  Fibroscan. US sheer wave elastography. Fibosure. Liver biopsy. Will perform   Have performed   laboratory testing to monitor liver function and degree of liver injury. This included   BMP,   hepatic panel,   CBC with platelet count,   INR. Liver transaminases are   normal.    elevated. AST is   normal.    elevated. ALT is   normal.    elevated. ALP is   normal.    elevated. Liver function is   normal.    depressed. Total bilirubin is   normal.    elevated. Serum albumin is   normal.    depressed.     INR is normal.    prolonged. The platelet count is   normal.    depressed. Based upon laboratory studies   Fibroscan,   Elastography,   and imaging    the patient   does not appear to have   appears to have   may have   significant liver injury. advanced liver disease. cirrhosis. Will perform and/or review results of   HCV viral load,   HCV genotype   to define the specific treatment and duration of treatment that will be required. Will perform    serologic   and   virologic   studies   to assess for other causes of chronic liver disease. Will perform imaging of the liver with   ultrasound. triple phase CT scan. dynamic MRI. MRI and MRCP because of persistent elevation in ALP and possible bile duct disease. The need to perform an assessment of liver fibrosis was discussed with the patient. The Fibroscan can assess liver fibrosis and determine if a patient has advanced fibrosis or cirrhosis without the need for liver biopsy. This will be performed at the next office visit. If the Fibroscan suggests advanced fibrosis then a liver biopsy should be considered. The Fibroscan can be repeated annually or as often as clinically indicated to assess for fibrosis progression and/or regression. Chronic HCV Treatment  The patient   has not been treated for HCV.    was previously treated for HCV with   peginterferon and ribavirin   and   boceprevir. telaprevir. sofosbuvir. simeprevir. Harvoni (sofasbuvir and ledipasvir)   Mavyret (glecaprevir and piprentasvir)   Zepetier (Grazaprevir and Elbasvir)   Epclusa (sofosbuvir and valpitasvir)   Vosevi (sofosbuvir, valpitasvir, voxalaprevir)       The previous treatment response was    not clearly defined. a non-response. partial virologic response. response then relapse. sustained virologic response/cure. The patient has HCV genotype   1A.  1B.  2.  3.  4.  5.  6.  that is not yet defined.     Discussed the treatment alternatives. The SVR/cure rate for HCV now exceeds 97% without significant side effects for most patients with HCV. The specific treatment is dependent upon genotype, viral load and histology. The patient should be treated with   Harvoni (sofosbuvir and ledipasvir)   Mavyret (glecaprevir and piprentasvir)   Epclusa (sofosbuvir and velpatasvir)   Vosevi (sofosbuvir, velpatasvir, voxalaprevir)   and ribavirin for   8 weeks   12 weeks. 16 weeks. 24 weeks. The SVR/cure rate for is over 95%. The patient is not a good candidate for HCV treatment. The patient has other medical problems with significant morbidity and limited life expectancy. Given the mild nature of the liver disease it is very unlikely HCV will have any impact on morbidity and mortality. The patient has advanced [address] We will consider HCV treatment if and when [address] has responded to treatment. The liver disease is too advanced. The best treatment would be to undergo liver transplant with an HCV positive liver and treat HCV after the transplant. Medications utilized to treat other medical problems would interfere with HCV treatment. The patient is still actively using intravenous or inhaling drugs or has only recently stopped. This situation carries a significant risk of reinfection following SVR/cure of HCV. The patient should concentrate on remaining drug free before HCV treatment is addressed. The patient has   severe depression/anxiety   active bipolar disease   active psychiatric disease   which needs to be treated and controlled to ensure compliance with the oral anti-viral regimen prior to initiating HCV treatment. Screening for Hepatocellular Carcinoma  HCC screening   is not necessary if the patient has no evidence of cirrhosis. has not been not been performed   since ***/***.  was performed in ***/*** and   does not suggest [address] demonstrates   an elevation in AFP.     a lesion on ultrasound. AFP was ordered today and ultrasound will be scheduled. Will repeat ultrasound in 6 months. Will perform   triple phase CT scan      dynamic MRI   to further characterize the lesion and help determine if this is HCC. Treatment of other medical problems in patients with chronic liver disease  There are no contraindications for the patient to take most medications that are necessary for treatment of other medical issues. The patient has cirrhrosis and should avoid taking NSAIDs which are associated with a higher rate of developing TAO. The patient had HE and should avoid taking Benzodiazapines which could exacerbate HE. The patient can take   any medications utilized for treatment of DM  statins to treat hypercholesterolemia    The patient has alcohol induced liver disease but has been abstinent from alcohol for greater than 6 months. Normal doses of acetaminophen, as recommended on the label of the bottle, are not hepatotoxic except in the setting of daily alcohol use, even in patients with cirrhosis and can be utilized for pain. The patient consumes alcohol on a daily basis or has recently stopped consuming alcohol. Regular alcohol use increases the risk of toxicity from acetaminophen. This analgesic should be avoided until the patient has been abstinent from alcohol for 6 months. Counseling for alcohol in patients with chronic liver disease  The patient was counseled regarding alcohol consumption and the effect of alcohol on chronic liver disease. The patient has cirrhosis and was advised to be abstinent from all alcohol including non-alcoholic beer which does contain some alcohol. The patient does not consume any significant amount of alcohol. The patient has not consumed alcohol since ***. The patient has continued to consume alcohol   daily. on rare social occasions. The patient was reminded that alcohol can cause fatty liver.   Patients who have undergone obesity surgery are at much greater risk to develop alcohol induced liver injury. The patient does not have a chronic liver disease and does not have to be abstinent from alcohol. The patient consumes too much alcohol and is at risk to develop alcohol induced liver liver injury. It was recommended that all alcohol consumption be stopped and the patient be abstinent from alcohol for at least *** months. If the patient cannot stop consuming alcohol then there is an aclohol use disorder and the patient should consider entering alcohol counseling and/or attending AA. The patient has an alcohol abuse disorder and it was suggested that they enter alcohol counseling and/or attend AA. If the patient cannot stop drinking alcohol they cannot be considered a candidate for liver transplant. The patient will need to attend alcohol counceling prior to being accepted as a liver transplant candidate. Substance Use  The patient was counseled regarding the risk of overdose and death from using opioids and other narcotic drugs. Discussed the risk of becoming reinfected with HCV once they are cured if they resume IV drug use or inhaling drugs nasally. The patient has not used drugs since ***/***. We would like the patient to demonstrate 6 months of abstinence from drug use and to have completed a drug abuse program prior to initiating HCV treatment. The patient is actively using narcotic drugs and was referred to a drug abuse program prior to initiating HCV treatment. There is no contraindication to treating HCV in patients who are actively using drugs and the SVR/cure rate is the same as persons who no not use drugs. Vaccinations   Vaccination for viral hepatitis A and B is recommended since the patient has no serologic evidence of previous exposure or vaccination with immunity. Vaccination for viral hepatitis A and B is not needed.   The patient has serologic evidence of prior exposure or vaccination with immunity. Vaccination for viral hepatitis A and B has been initiated. Vaccination for viral hepatitis A and B has been completed. Serologic studies will be performed to assess response to vaccine. The need for vaccination against viral hepatitis A and B will be assessed with serologic and instituted as appropriate. Since the patient does not have a chronic liver disease which can lead to liver injury screening for HAV and HBV is not needed. The patient has   not   received   2 doses   and the booster dose   of COVID-19 vaccine. The patient should receive a booster dose of COVID-19 vaccine in ***/***. The patient had COVID-19 infection and recovered. The patient does not want to take COVID-19 vaccine. The patient was encouraged to take the COVID-19 vaccine. Routine vaccinations against other bacterial and viral agents can be performed as indicated. Annual flu vaccination should be administered if indicated. ALLERGIES  Allergies   Allergen Reactions    Ampicillin Anaphylaxis    Codeine Anaphylaxis    Morphine Anaphylaxis    Penicillins Anaphylaxis       MEDICATIONS  Current Outpatient Medications   Medication Sig    hydroCHLOROthiazide (HYDRODIURIL) 25 mg tablet TAKE 1 TABLET BY MOUTH EVERY DAY    carvediloL (COREG) 12.5 mg tablet TAKE 1 TABLET BY MOUTH TWICE A DAY    ondansetron (ZOFRAN ODT) 8 mg disintegrating tablet TAKE 1 TABLET BY MOUTH EVERY 8 HOURS AS NEEDED FOR NAUSEA    ALPRAZolam (Xanax) 0.5 mg tablet Take 1 Tablet by mouth three (3) times daily as needed for Anxiety. Max Daily Amount: 1.5 mg. Indications: repeated episodes of anxiety    nitroglycerin (NITROSTAT) 0.4 mg SL tablet 1 Tablet by SubLINGual route every five (5) minutes as needed for Chest Pain. Up to 3 doses. 0.4 mg by SubLINGual route every five (5) minutes as needed for Chest Pain. Up to 3 doses. pantoprazole (PROTONIX) 40 mg tablet TAKE 1 TABLET BY MOUTH TWO TIMES A DAY.     prazosin (MINIPRESS) 2 mg capsule TAKE 1 CAPSULE BY MOUTH NIGHTLY. INDICATIONS: POSTTRAUMATIC STRESS SYNDROME    albuterol (PROVENTIL VENTOLIN) 2.5 mg /3 mL (0.083 %) nebu 3 mL by Nebulization route every four (4) hours as needed for Wheezing. cyclobenzaprine (FLEXERIL) 10 mg tablet TAKE 1 TABLET BY MOUTH 2 TIMES DAILY AS NEEDED FOR MUSCLE SPASM(S). Indications: muscle spasm    Nebulizer Accessories kit Use every 4 hours as needed for wheezing    predniSONE (DELTASONE) 20 mg tablet 5 tablets day for days 1 through 4, then 4 tablets on day 5, then 3 tablets on day 6, then 2 tablets on day 7, then 1 tablet on day 8.    albuterol (PROVENTIL HFA, VENTOLIN HFA, PROAIR HFA) 90 mcg/actuation inhaler INHALE 2 PUFFS BY MOUTH EVERY 4 HOURS AS NEEDED FOR WHEEZE    gabapentin (NEURONTIN) 600 mg tablet Take 1 Tablet by mouth three (3) times daily. Max Daily Amount: 1,800 mg. Nebulizer & Compressor machine Use q 4 hours as needed for dyspnea or wheezing    dronabinoL (MARINOL) 5 mg capsule Take 1 Capsule by mouth as needed. aspirin (ASPIRIN) 325 mg tablet Take 1 Tablet by mouth daily. No current facility-administered medications for this visit. SYSTEM REVIEW NOT RELATED TO LIVER DISEASE OR REVIEWED ABOVE:  Constitution systems: Negative for fever, chills, weight gain, weight loss. Eyes: Negative for visual changes. ENT: Negative for sore throat, painful swallowing. Respiratory: Negative for cough, hemoptysis, SOB. Cardiology: Negative for chest pain, palpitations. GI:  Negative for constipation or diarrhea. : Negative for urinary frequency, dysuria, hematuria, nocturia. Skin: Negative for rash. Hematology: Negative for easy bruising, blood clots. Musculo-skelatal: Negative for back pain, muscle pain, weakness. Neurologic: Negative for headaches, dizziness, vertigo, memory problems not related to HE. Psychology: Negative for anxiety, depression. FAMILY HISTORY:  The father  of AAA.     The mother  of COPD. There is no family history of liver disease. SOCIAL HISTORY:  The patient is . The patient has 2 children, and 5 grandchildren. The patient currently smokes 2 cigarettes daily. The patient has never consumed significant amounts of alcohol. 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 PHYSICAL EXAMINATION:  Visit Vitals  BP (!) 121/90 (BP 1 Location: Left upper arm, BP Patient Position: Sitting, BP Cuff Size: Adult)   Pulse 68   Temp 97.5 °F (36.4 °C) (Temporal)   Ht 5' 2\" (1.575 m)   Wt 160 lb (72.6 kg)   SpO2 92%   BMI 29.26 kg/m²     General: No acute distress. Eyes: Sclera anicteric. ENT: No oral lesions. Thyroid normal.  Nodes: No adenopathy. Skin: No spider angiomata. No jaundice. No palmar erythema. Respiratory: Lungs clear to auscultation. Cardiovascular: Regular heart rate. No murmurs. No JVD. Abdomen: Soft non-tender. Liver size normal to percussion/palpation. Spleen not palpable. No obvious ascites. Extremities: No edema. No muscle wasting. No gross arthritic changes. Neurologic: Alert and oriented. Cranial nerves grossly intact. No asterixis. LABORATORY STUDIES:  Recent liver function panel, CBC with platelet count and BMP are not available. These studies will be performed. SEROLOGIES:  Not available or performed. Testing was performed today. LIVER HISTOLOGY:  Not available or performed    ENDOSCOPIC PROCEDURES:  Not available or performed    RADIOLOGY:  Not available or performed    OTHER TESTING:  Not available or performed    FOLLOW-UP:  All of the issues listed above in the Assessment and Plan were discussed with the patient. All questions were answered. The patient expressed a clear understanding of the above. [address] [facility] [facility] ***   weeks   months   for Fibroscan   for elastography   2 weeks after liver biopsy.   which should be 1-2 weeks after the next imaging study. and to initiate HCV treatment. to assess for the effects of diet changes and weight loss. to assess the effects and tolerability of ***.  for screening and enrollment into a clinical trial for treatment of ***. The patient was given a follow-up appointment for *** months in case she decides not to enter or is excluded from entering the clinical trial.  for routine monitoring. to review all data and determine the treatment plan.

## 2023-04-21 NOTE — PROGRESS NOTES
Identified pt with two pt identifiers(name and ). Reviewed record in preparation for visit and have obtained necessary documentation. Chief Complaint   Patient presents with    Establish Care      Vitals:    23 1317   BP: (!) 121/90   Pulse: 68   Temp: 97.5 °F (36.4 °C)   TempSrc: Temporal   SpO2: 92%   Weight: 160 lb (72.6 kg)   Height: 5' 2\" (1.575 m)   PainSc:   4   PainLoc: Foot       Health Maintenance Review: Patient reminded of \"due or due soon\" health maintenance. I have asked the patient to contact his/her primary care provider (PCP) for follow-up on his/her health maintenance. Coordination of Care Questionnaire:  :   1) Have you been to an emergency room, urgent care, or hospitalized since your last visit? If yes, where when, and reason for visit? Yes per pt she broke her left foot      2. Have seen or consulted any other health care provider since your last visit? If yes, where when, and reason for visit?no      Patient is accompanied by self I have received verbal consent from Jude العلي to discuss any/all medical information while they are present in the room.

## 2023-04-22 LAB
ALBUMIN SERPL-MCNC: 4.5 G/DL (ref 3.5–5)
ALBUMIN/GLOB SERPL: 1.4 (ref 1.1–2.2)
ALP SERPL-CCNC: 62 U/L (ref 45–117)
ALT SERPL-CCNC: 26 U/L (ref 12–78)
ANION GAP SERPL CALC-SCNC: 3 MMOL/L (ref 5–15)
AST SERPL-CCNC: 20 U/L (ref 15–37)
BASOPHILS # BLD: 0.1 K/UL (ref 0–0.1)
BASOPHILS NFR BLD: 1 % (ref 0–1)
BILIRUB DIRECT SERPL-MCNC: 0.1 MG/DL (ref 0–0.2)
BILIRUB SERPL-MCNC: 0.5 MG/DL (ref 0.2–1)
BUN SERPL-MCNC: 15 MG/DL (ref 6–20)
BUN/CREAT SERPL: 21 (ref 12–20)
CALCIUM SERPL-MCNC: 9.6 MG/DL (ref 8.5–10.1)
CHLORIDE SERPL-SCNC: 100 MMOL/L (ref 97–108)
CO2 SERPL-SCNC: 35 MMOL/L (ref 21–32)
CREAT SERPL-MCNC: 0.72 MG/DL (ref 0.55–1.02)
DIFFERENTIAL METHOD BLD: ABNORMAL
EOSINOPHIL # BLD: 0.4 K/UL (ref 0–0.4)
EOSINOPHIL NFR BLD: 4 % (ref 0–7)
ERYTHROCYTE [DISTWIDTH] IN BLOOD BY AUTOMATED COUNT: 14 % (ref 11.5–14.5)
GLOBULIN SER CALC-MCNC: 3.3 G/DL (ref 2–4)
GLUCOSE SERPL-MCNC: 86 MG/DL (ref 65–100)
HCT VFR BLD AUTO: 53.3 % (ref 35–47)
HGB BLD-MCNC: 16 G/DL (ref 11.5–16)
IMM GRANULOCYTES # BLD AUTO: 0 K/UL (ref 0–0.04)
IMM GRANULOCYTES NFR BLD AUTO: 0 % (ref 0–0.5)
LYMPHOCYTES # BLD: 3.2 K/UL (ref 0.8–3.5)
LYMPHOCYTES NFR BLD: 31 % (ref 12–49)
MCH RBC QN AUTO: 29 PG (ref 26–34)
MCHC RBC AUTO-ENTMCNC: 30 G/DL (ref 30–36.5)
MCV RBC AUTO: 96.7 FL (ref 80–99)
MONOCYTES # BLD: 0.5 K/UL (ref 0–1)
MONOCYTES NFR BLD: 4 % (ref 5–13)
NEUTS SEG # BLD: 6.2 K/UL (ref 1.8–8)
NEUTS SEG NFR BLD: 60 % (ref 32–75)
NRBC # BLD: 0 K/UL (ref 0–0.01)
NRBC BLD-RTO: 0 PER 100 WBC
PLATELET # BLD AUTO: 293 K/UL (ref 150–400)
PMV BLD AUTO: 11.3 FL (ref 8.9–12.9)
POTASSIUM SERPL-SCNC: 4.4 MMOL/L (ref 3.5–5.1)
PROT SERPL-MCNC: 7.8 G/DL (ref 6.4–8.2)
RBC # BLD AUTO: 5.51 M/UL (ref 3.8–5.2)
SODIUM SERPL-SCNC: 138 MMOL/L (ref 136–145)
WBC # BLD AUTO: 10.3 K/UL (ref 3.6–11)

## 2023-04-26 LAB
HCV RNA SERPL NAA+PROBE-ACNC: NORMAL IU/ML
TEST INFORMATION: NORMAL

## 2023-05-03 ENCOUNTER — TELEPHONE (OUTPATIENT)
Dept: HEMATOLOGY | Age: 63
End: 2023-05-03

## 2023-05-03 ENCOUNTER — OFFICE VISIT (OUTPATIENT)
Dept: FAMILY MEDICINE CLINIC | Age: 63
End: 2023-05-03
Payer: MEDICARE

## 2023-05-03 VITALS
HEART RATE: 84 BPM | OXYGEN SATURATION: 95 % | RESPIRATION RATE: 18 BRPM | HEIGHT: 62 IN | DIASTOLIC BLOOD PRESSURE: 70 MMHG | WEIGHT: 160 LBS | SYSTOLIC BLOOD PRESSURE: 104 MMHG | TEMPERATURE: 98.4 F | BODY MASS INDEX: 29.44 KG/M2

## 2023-05-03 DIAGNOSIS — G89.29 CHRONIC BILATERAL BACK PAIN, UNSPECIFIED BACK LOCATION: ICD-10-CM

## 2023-05-03 DIAGNOSIS — E78.5 DYSLIPIDEMIA: ICD-10-CM

## 2023-05-03 DIAGNOSIS — F43.10 PTSD (POST-TRAUMATIC STRESS DISORDER): ICD-10-CM

## 2023-05-03 DIAGNOSIS — F41.1 GAD (GENERALIZED ANXIETY DISORDER): ICD-10-CM

## 2023-05-03 DIAGNOSIS — K21.9 GASTROESOPHAGEAL REFLUX DISEASE WITHOUT ESOPHAGITIS: ICD-10-CM

## 2023-05-03 DIAGNOSIS — M54.9 CHRONIC BILATERAL BACK PAIN, UNSPECIFIED BACK LOCATION: ICD-10-CM

## 2023-05-03 DIAGNOSIS — F17.200 SMOKER: ICD-10-CM

## 2023-05-03 DIAGNOSIS — I10 HYPERTENSION, ESSENTIAL: Primary | ICD-10-CM

## 2023-05-03 PROCEDURE — 3078F DIAST BP <80 MM HG: CPT | Performed by: FAMILY MEDICINE

## 2023-05-03 PROCEDURE — 3017F COLORECTAL CA SCREEN DOC REV: CPT | Performed by: FAMILY MEDICINE

## 2023-05-03 PROCEDURE — G8510 SCR DEP NEG, NO PLAN REQD: HCPCS | Performed by: FAMILY MEDICINE

## 2023-05-03 PROCEDURE — G9899 SCRN MAM PERF RSLTS DOC: HCPCS | Performed by: FAMILY MEDICINE

## 2023-05-03 PROCEDURE — 99214 OFFICE O/P EST MOD 30 MIN: CPT | Performed by: FAMILY MEDICINE

## 2023-05-03 PROCEDURE — 3074F SYST BP LT 130 MM HG: CPT | Performed by: FAMILY MEDICINE

## 2023-05-03 PROCEDURE — G8427 DOCREV CUR MEDS BY ELIG CLIN: HCPCS | Performed by: FAMILY MEDICINE

## 2023-05-03 PROCEDURE — G8417 CALC BMI ABV UP PARAM F/U: HCPCS | Performed by: FAMILY MEDICINE

## 2023-05-03 RX ORDER — GABAPENTIN 600 MG/1
600 TABLET ORAL 3 TIMES DAILY
Qty: 270 TABLET | Refills: 0 | Status: SHIPPED | OUTPATIENT
Start: 2023-05-03

## 2023-05-03 RX ORDER — ALPRAZOLAM 0.5 MG/1
0.5 TABLET ORAL
Qty: 270 TABLET | Refills: 0 | Status: SHIPPED | OUTPATIENT
Start: 2023-05-03

## 2023-05-05 ENCOUNTER — TELEPHONE (OUTPATIENT)
Dept: HEMATOLOGY | Age: 63
End: 2023-05-05

## 2023-05-16 DIAGNOSIS — K31.84 GASTROPARESIS: ICD-10-CM

## 2023-05-16 RX ORDER — ONDANSETRON 8 MG/1
TABLET, ORALLY DISINTEGRATING ORAL
Qty: 30 TABLET | Refills: 2 | Status: SHIPPED | OUTPATIENT
Start: 2023-05-16

## 2023-06-01 DIAGNOSIS — K21.9 GASTRO-ESOPHAGEAL REFLUX DISEASE WITHOUT ESOPHAGITIS: ICD-10-CM

## 2023-06-01 DIAGNOSIS — M54.6 PAIN IN THORACIC SPINE: ICD-10-CM

## 2023-06-01 RX ORDER — CYCLOBENZAPRINE HCL 10 MG
TABLET ORAL
Qty: 180 TABLET | Refills: 1 | Status: SHIPPED | OUTPATIENT
Start: 2023-06-01

## 2023-06-01 RX ORDER — PANTOPRAZOLE SODIUM 40 MG/1
TABLET, DELAYED RELEASE ORAL
Qty: 180 TABLET | Refills: 1 | Status: SHIPPED | OUTPATIENT
Start: 2023-06-01

## 2023-06-02 DIAGNOSIS — I10 ESSENTIAL (PRIMARY) HYPERTENSION: ICD-10-CM

## 2023-06-05 RX ORDER — CARVEDILOL 12.5 MG/1
TABLET ORAL
Qty: 180 TABLET | Refills: 0 | Status: SHIPPED | OUTPATIENT
Start: 2023-06-05

## 2023-06-20 DIAGNOSIS — I10 ESSENTIAL (PRIMARY) HYPERTENSION: ICD-10-CM

## 2023-06-20 RX ORDER — HYDROCHLOROTHIAZIDE 25 MG/1
TABLET ORAL
Qty: 90 TABLET | Refills: 0 | Status: SHIPPED | OUTPATIENT
Start: 2023-06-20 | End: 2023-08-01 | Stop reason: SDUPTHER

## 2023-06-20 NOTE — TELEPHONE ENCOUNTER
Office Visit on 06/15/2023   Component Date Value Ref Range Status    Color, Urine, POC 06/15/2023 Yellow   Final    Clarity, Urine, POC 06/15/2023 Clear   Final    Glucose, Urine, POC 06/15/2023 Negative  Negative Final    Bilirubin, Urine, POC 06/15/2023 Negative  Negative Final    Ketones, Urine, POC 06/15/2023 Negative  Negative Final    Specific Gravity, Urine, POC 06/15/2023 1.010  1.001 - 1.035 Final    Blood, Urine, POC 06/15/2023 Moderate  Negative Final    pH, Urine, POC 06/15/2023 6.5  4.6 - 8.0 Final    Protein, Urine, POC 06/15/2023 Negative  Negative Final    Urobilinogen, POC 06/15/2023 0.2 mg/dL   Final    Nitrite, Urine, POC 06/15/2023 Negative  Negative Final    Leukocyte Esterase, Urine, POC 06/15/2023 Negative  Negative Final    Special Requests 06/15/2023 NO SPECIAL REQUESTS    Final    Culture 06/15/2023 No significant growth, <10,000 CFU/mL    Final

## 2023-08-01 ENCOUNTER — OFFICE VISIT (OUTPATIENT)
Age: 63
End: 2023-08-01
Payer: MEDICARE

## 2023-08-01 VITALS
HEIGHT: 62 IN | HEART RATE: 74 BPM | WEIGHT: 157 LBS | OXYGEN SATURATION: 89 % | DIASTOLIC BLOOD PRESSURE: 69 MMHG | RESPIRATION RATE: 18 BRPM | SYSTOLIC BLOOD PRESSURE: 97 MMHG | TEMPERATURE: 98.2 F | BODY MASS INDEX: 28.89 KG/M2

## 2023-08-01 DIAGNOSIS — M89.8X7 EXOSTOSIS OF RIGHT FOOT: Primary | ICD-10-CM

## 2023-08-01 DIAGNOSIS — F41.1 GENERALIZED ANXIETY DISORDER: ICD-10-CM

## 2023-08-01 DIAGNOSIS — I10 ESSENTIAL (PRIMARY) HYPERTENSION: ICD-10-CM

## 2023-08-01 PROCEDURE — 3074F SYST BP LT 130 MM HG: CPT | Performed by: FAMILY MEDICINE

## 2023-08-01 PROCEDURE — 99214 OFFICE O/P EST MOD 30 MIN: CPT | Performed by: FAMILY MEDICINE

## 2023-08-01 PROCEDURE — 3078F DIAST BP <80 MM HG: CPT | Performed by: FAMILY MEDICINE

## 2023-08-01 RX ORDER — HYDROCHLOROTHIAZIDE 25 MG/1
12.5 TABLET ORAL DAILY
Qty: 90 TABLET | Refills: 1
Start: 2023-08-01

## 2023-08-01 RX ORDER — ALPRAZOLAM 0.5 MG/1
0.5 TABLET ORAL 2 TIMES DAILY PRN
Qty: 180 TABLET | Refills: 0 | Status: SHIPPED | OUTPATIENT
Start: 2023-08-01 | End: 2023-10-30

## 2023-08-01 SDOH — ECONOMIC STABILITY: INCOME INSECURITY: HOW HARD IS IT FOR YOU TO PAY FOR THE VERY BASICS LIKE FOOD, HOUSING, MEDICAL CARE, AND HEATING?: NOT HARD AT ALL

## 2023-08-01 SDOH — ECONOMIC STABILITY: HOUSING INSECURITY
IN THE LAST 12 MONTHS, WAS THERE A TIME WHEN YOU DID NOT HAVE A STEADY PLACE TO SLEEP OR SLEPT IN A SHELTER (INCLUDING NOW)?: NO

## 2023-08-01 SDOH — ECONOMIC STABILITY: FOOD INSECURITY: WITHIN THE PAST 12 MONTHS, YOU WORRIED THAT YOUR FOOD WOULD RUN OUT BEFORE YOU GOT MONEY TO BUY MORE.: NEVER TRUE

## 2023-08-01 SDOH — ECONOMIC STABILITY: FOOD INSECURITY: WITHIN THE PAST 12 MONTHS, THE FOOD YOU BOUGHT JUST DIDN'T LAST AND YOU DIDN'T HAVE MONEY TO GET MORE.: NEVER TRUE

## 2023-08-01 ASSESSMENT — ENCOUNTER SYMPTOMS
SINUS PRESSURE: 0
DIARRHEA: 0
BLOOD IN STOOL: 0
FACIAL SWELLING: 0
COLOR CHANGE: 0
EYE REDNESS: 0
ABDOMINAL PAIN: 0
RHINORRHEA: 0
VOICE CHANGE: 0
ABDOMINAL DISTENTION: 0
WHEEZING: 0
BACK PAIN: 0
SHORTNESS OF BREATH: 0
EYE PAIN: 0
NAUSEA: 0
CHEST TIGHTNESS: 0
SORE THROAT: 0
CONSTIPATION: 0
ANAL BLEEDING: 0
COUGH: 0

## 2023-08-01 ASSESSMENT — ANXIETY QUESTIONNAIRES
4. TROUBLE RELAXING: 1
GAD7 TOTAL SCORE: 7
3. WORRYING TOO MUCH ABOUT DIFFERENT THINGS: 1
5. BEING SO RESTLESS THAT IT IS HARD TO SIT STILL: 1
IF YOU CHECKED OFF ANY PROBLEMS ON THIS QUESTIONNAIRE, HOW DIFFICULT HAVE THESE PROBLEMS MADE IT FOR YOU TO DO YOUR WORK, TAKE CARE OF THINGS AT HOME, OR GET ALONG WITH OTHER PEOPLE: NOT DIFFICULT AT ALL
2. NOT BEING ABLE TO STOP OR CONTROL WORRYING: 1
1. FEELING NERVOUS, ANXIOUS, OR ON EDGE: 1
7. FEELING AFRAID AS IF SOMETHING AWFUL MIGHT HAPPEN: 1
6. BECOMING EASILY ANNOYED OR IRRITABLE: 1

## 2023-08-01 ASSESSMENT — PATIENT HEALTH QUESTIONNAIRE - PHQ9
1. LITTLE INTEREST OR PLEASURE IN DOING THINGS: 0
SUM OF ALL RESPONSES TO PHQ QUESTIONS 1-9: 0
SUM OF ALL RESPONSES TO PHQ9 QUESTIONS 1 & 2: 0
2. FEELING DOWN, DEPRESSED OR HOPELESS: 0
SUM OF ALL RESPONSES TO PHQ QUESTIONS 1-9: 0

## 2023-08-01 NOTE — PROGRESS NOTES
Deep Tendon Reflexes: Reflexes normal.   Psychiatric:         Mood and Affect: Mood normal.         Behavior: Behavior normal.         Thought Content: Thought content normal.         Judgment: Judgment normal.         Diagnosis Orders   1. Exostosis of right foot  XR FOOT RIGHT (MIN 3 VIEWS)    REHABILITATION [facility] - Ibrahima Barnes MD, Orthopedic Surgery, [place]      2. Essential (primary) hypertension  hydroCHLOROthiazide (HYDRODIURIL) 25 MG tablet      3. Generalized anxiety disorder  ALPRAZolam (XANAX) 0.5 MG tablet          Orders Placed This Encounter   Procedures    XR FOOT RIGHT (MIN 3 VIEWS)     Standing Status:   Future     Standing Expiration Date:   8/1/2024     Order Specific Question:   Reason for exam:     Answer:   Exostosis right foot    Pratibha Gardiner MD, Orthopedic Surgery, [place]     Referral Priority:   Routine     Referral Type:   Eval and Treat     Referral Reason:   Specialty Services Required     Referred to Provider:   Bishnu Colindres MD     Requested Specialty:   Orthopedic Surgery     Number of Visits Requested:   1       No follow-ups on file.      Jorge Burgos MD

## 2023-08-31 DIAGNOSIS — I10 ESSENTIAL (PRIMARY) HYPERTENSION: ICD-10-CM

## 2023-08-31 RX ORDER — CARVEDILOL 12.5 MG/1
TABLET ORAL
Qty: 180 TABLET | Refills: 1 | Status: SHIPPED | OUTPATIENT
Start: 2023-08-31

## 2023-08-31 NOTE — TELEPHONE ENCOUNTER
Patient requesting refill on     Requested Prescriptions     Pending Prescriptions Disp Refills    carvedilol (COREG) 12.5 MG tablet [Pharmacy Med Name: CARVEDILOL 12.5 MG TABLET] 180 tablet 0     Sig: TAKE 1 TABLET BY MOUTH TWICE A DAY        Last OV 8/1/2023

## 2023-09-22 DIAGNOSIS — I10 ESSENTIAL (PRIMARY) HYPERTENSION: ICD-10-CM

## 2023-09-22 NOTE — TELEPHONE ENCOUNTER
Patient requesting refill on     Requested Prescriptions     Pending Prescriptions Disp Refills    hydroCHLOROthiazide (HYDRODIURIL) 25 MG tablet [Pharmacy Med Name: HYDROCHLOROTHIAZIDE 25 MG TAB] 90 tablet 1     Sig: TAKE 1 TABLET BY MOUTH EVERY DAY        Last OV 8/1/2023

## 2023-09-25 RX ORDER — HYDROCHLOROTHIAZIDE 25 MG/1
25 TABLET ORAL DAILY
Qty: 90 TABLET | Refills: 0 | Status: SHIPPED | OUTPATIENT
Start: 2023-09-25

## 2023-10-03 ENCOUNTER — OFFICE VISIT (OUTPATIENT)
Age: 63
End: 2023-10-03
Payer: MEDICARE

## 2023-10-03 VITALS
HEIGHT: 62 IN | TEMPERATURE: 98.1 F | DIASTOLIC BLOOD PRESSURE: 76 MMHG | RESPIRATION RATE: 20 BRPM | SYSTOLIC BLOOD PRESSURE: 110 MMHG | OXYGEN SATURATION: 95 % | WEIGHT: 155.8 LBS | HEART RATE: 67 BPM | BODY MASS INDEX: 28.67 KG/M2

## 2023-10-03 DIAGNOSIS — G89.29 CHRONIC BILATERAL LOW BACK PAIN WITHOUT SCIATICA: ICD-10-CM

## 2023-10-03 DIAGNOSIS — F41.1 GENERALIZED ANXIETY DISORDER: ICD-10-CM

## 2023-10-03 DIAGNOSIS — M25.511 CHRONIC PAIN OF BOTH SHOULDERS: Primary | ICD-10-CM

## 2023-10-03 DIAGNOSIS — M54.50 CHRONIC BILATERAL LOW BACK PAIN WITHOUT SCIATICA: ICD-10-CM

## 2023-10-03 DIAGNOSIS — M25.512 CHRONIC PAIN OF BOTH SHOULDERS: Primary | ICD-10-CM

## 2023-10-03 DIAGNOSIS — G89.29 CHRONIC PAIN OF BOTH SHOULDERS: Primary | ICD-10-CM

## 2023-10-03 PROCEDURE — 3078F DIAST BP <80 MM HG: CPT | Performed by: FAMILY MEDICINE

## 2023-10-03 PROCEDURE — 99213 OFFICE O/P EST LOW 20 MIN: CPT | Performed by: FAMILY MEDICINE

## 2023-10-03 PROCEDURE — 3074F SYST BP LT 130 MM HG: CPT | Performed by: FAMILY MEDICINE

## 2023-10-03 RX ORDER — ALPRAZOLAM 0.5 MG/1
0.5 TABLET ORAL 2 TIMES DAILY PRN
Qty: 180 TABLET | Refills: 0 | Status: SHIPPED | OUTPATIENT
Start: 2023-10-03 | End: 2024-01-01

## 2023-10-03 RX ORDER — GABAPENTIN 600 MG/1
600 TABLET ORAL 2 TIMES DAILY
Qty: 180 TABLET | Refills: 0 | Status: SHIPPED | OUTPATIENT
Start: 2023-10-03 | End: 2024-01-01

## 2023-10-03 SDOH — ECONOMIC STABILITY: FOOD INSECURITY: WITHIN THE PAST 12 MONTHS, THE FOOD YOU BOUGHT JUST DIDN'T LAST AND YOU DIDN'T HAVE MONEY TO GET MORE.: NEVER TRUE

## 2023-10-03 SDOH — ECONOMIC STABILITY: FOOD INSECURITY: WITHIN THE PAST 12 MONTHS, YOU WORRIED THAT YOUR FOOD WOULD RUN OUT BEFORE YOU GOT MONEY TO BUY MORE.: NEVER TRUE

## 2023-10-03 SDOH — ECONOMIC STABILITY: INCOME INSECURITY: HOW HARD IS IT FOR YOU TO PAY FOR THE VERY BASICS LIKE FOOD, HOUSING, MEDICAL CARE, AND HEATING?: NOT HARD AT ALL

## 2023-10-03 ASSESSMENT — ANXIETY QUESTIONNAIRES
2. NOT BEING ABLE TO STOP OR CONTROL WORRYING: 1
GAD7 TOTAL SCORE: 3
5. BEING SO RESTLESS THAT IT IS HARD TO SIT STILL: 0
4. TROUBLE RELAXING: 0
7. FEELING AFRAID AS IF SOMETHING AWFUL MIGHT HAPPEN: 0
6. BECOMING EASILY ANNOYED OR IRRITABLE: 0
IF YOU CHECKED OFF ANY PROBLEMS ON THIS QUESTIONNAIRE, HOW DIFFICULT HAVE THESE PROBLEMS MADE IT FOR YOU TO DO YOUR WORK, TAKE CARE OF THINGS AT HOME, OR GET ALONG WITH OTHER PEOPLE: NOT DIFFICULT AT ALL
1. FEELING NERVOUS, ANXIOUS, OR ON EDGE: 1
3. WORRYING TOO MUCH ABOUT DIFFERENT THINGS: 1

## 2023-10-03 ASSESSMENT — PATIENT HEALTH QUESTIONNAIRE - PHQ9
SUM OF ALL RESPONSES TO PHQ QUESTIONS 1-9: 0
SUM OF ALL RESPONSES TO PHQ9 QUESTIONS 1 & 2: 0
2. FEELING DOWN, DEPRESSED OR HOPELESS: 0
SUM OF ALL RESPONSES TO PHQ QUESTIONS 1-9: 0
1. LITTLE INTEREST OR PLEASURE IN DOING THINGS: 0
SUM OF ALL RESPONSES TO PHQ QUESTIONS 1-9: 0
SUM OF ALL RESPONSES TO PHQ QUESTIONS 1-9: 0

## 2023-10-03 ASSESSMENT — ENCOUNTER SYMPTOMS
BLOOD IN STOOL: 0
ABDOMINAL DISTENTION: 0
RHINORRHEA: 0
DIARRHEA: 0
FACIAL SWELLING: 0
NAUSEA: 0
SHORTNESS OF BREATH: 0
CONSTIPATION: 0
EYE PAIN: 0
BACK PAIN: 0
COUGH: 0
CHEST TIGHTNESS: 0
ABDOMINAL PAIN: 0
SINUS PRESSURE: 0
WHEEZING: 0
VOICE CHANGE: 0
COLOR CHANGE: 0
ANAL BLEEDING: 0
SORE THROAT: 0
EYE REDNESS: 0

## 2023-10-05 ENCOUNTER — PATIENT MESSAGE (OUTPATIENT)
Dept: OTHER | Facility: CLINIC | Age: 63
End: 2023-10-05

## 2023-10-18 DIAGNOSIS — J45.20 MILD INTERMITTENT ASTHMA, UNCOMPLICATED: ICD-10-CM

## 2023-10-18 RX ORDER — ALBUTEROL SULFATE 90 UG/1
2 AEROSOL, METERED RESPIRATORY (INHALATION) EVERY 4 HOURS PRN
Qty: 6.7 EACH | Refills: 5 | Status: SHIPPED | OUTPATIENT
Start: 2023-10-18

## 2023-11-10 ENCOUNTER — NURSE TRIAGE (OUTPATIENT)
Dept: OTHER | Facility: CLINIC | Age: 63
End: 2023-11-10

## 2023-11-10 ENCOUNTER — SCHEDULED TELEPHONE ENCOUNTER (OUTPATIENT)
Age: 63
End: 2023-11-10

## 2023-11-10 DIAGNOSIS — J45.21 MILD INTERMITTENT ASTHMA WITH ACUTE EXACERBATION: ICD-10-CM

## 2023-11-10 DIAGNOSIS — U07.1 COVID-19: Primary | ICD-10-CM

## 2023-11-10 RX ORDER — IPRATROPIUM BROMIDE 42 UG/1
2 SPRAY, METERED NASAL 4 TIMES DAILY
Qty: 15 ML | Refills: 3 | Status: SHIPPED | OUTPATIENT
Start: 2023-11-10

## 2023-11-10 SDOH — ECONOMIC STABILITY: INCOME INSECURITY: HOW HARD IS IT FOR YOU TO PAY FOR THE VERY BASICS LIKE FOOD, HOUSING, MEDICAL CARE, AND HEATING?: NOT HARD AT ALL

## 2023-11-10 SDOH — ECONOMIC STABILITY: FOOD INSECURITY: WITHIN THE PAST 12 MONTHS, YOU WORRIED THAT YOUR FOOD WOULD RUN OUT BEFORE YOU GOT MONEY TO BUY MORE.: NEVER TRUE

## 2023-11-10 SDOH — ECONOMIC STABILITY: FOOD INSECURITY: WITHIN THE PAST 12 MONTHS, THE FOOD YOU BOUGHT JUST DIDN'T LAST AND YOU DIDN'T HAVE MONEY TO GET MORE.: NEVER TRUE

## 2023-11-10 ASSESSMENT — ENCOUNTER SYMPTOMS
EYE REDNESS: 0
WHEEZING: 0
EYE PAIN: 0
FACIAL SWELLING: 0
NAUSEA: 0
CONSTIPATION: 0
COUGH: 1
RHINORRHEA: 1
COLOR CHANGE: 0
BLOOD IN STOOL: 0
VOICE CHANGE: 0
DIARRHEA: 0
ABDOMINAL PAIN: 0
SHORTNESS OF BREATH: 0
SORE THROAT: 1
ABDOMINAL DISTENTION: 0
BACK PAIN: 0
ANAL BLEEDING: 0
SINUS PRESSURE: 1
CHEST TIGHTNESS: 0

## 2023-11-10 ASSESSMENT — PATIENT HEALTH QUESTIONNAIRE - PHQ9
SUM OF ALL RESPONSES TO PHQ QUESTIONS 1-9: 0
SUM OF ALL RESPONSES TO PHQ QUESTIONS 1-9: 0
1. LITTLE INTEREST OR PLEASURE IN DOING THINGS: 0
SUM OF ALL RESPONSES TO PHQ9 QUESTIONS 1 & 2: 0
SUM OF ALL RESPONSES TO PHQ QUESTIONS 1-9: 0
SUM OF ALL RESPONSES TO PHQ QUESTIONS 1-9: 0
2. FEELING DOWN, DEPRESSED OR HOPELESS: 0

## 2023-11-10 NOTE — TELEPHONE ENCOUNTER
[facility] [facility]    Received call from Alea Clements constantin at [facility] [place], operated by [facility] with [employer] Complaint. Subjective: Caller states \" green congestion, fever, coughing, SOB, loss of appetite. \"     Current Symptoms:   Covid positive home test today. 2-3 days ago had watery diarrhea, had to take a lot of imodium. This has been going on and off for 1 month. SOB when ambulating and coughing. Green discharge thick, out of nose and lungs. HA for the past week. Patient feels very weak and tired and cant stop coughing, states she cant sleep due to the coughing waking her up. Onset: 1 month ago; worsening    Associated Symptoms: reduced activity    Pain Severity: 0/10; N/A; none    Temperature: 101     What has been tried: None    Recommended disposition: Go to ED/UCC Now (Or to Office with PCP Approval)    Care advice provided, patient verbalizes understanding; denies any other questions or concerns; instructed to call back for any new or worsening symptoms. Writer provided warm transfer to [facility] at [facility] for 2nd level triage. Attention Provider: Thank you for allowing me to participate in the care of your patient. The patient was connected to triage in response to information provided to the ECC/PSC. Please do not respond through this encounter as the response is not directed to a shared pool.     Reason for Disposition   Patient sounds very sick or weak to the triager    Protocols used: Coronavirus (COVID-19) Diagnosed or Suspected-ADULT-[place]

## 2023-11-10 NOTE — PROGRESS NOTES
Total Time: minutes: 11-20 minutes     Romelia Mares was evaluated through a synchronous (real-time) audio encounter. Patient identification was verified at the start of the visit. She (or guardian if applicable) is aware that this is a billable service, which includes applicable co-pays. This visit was conducted with the patient's (and/or legal guardian's) verbal consent. She has not had a related appointment within my department in the past 7 days or scheduled within the next 24 hours. The patient was located at Home: [place] [id number]. The provider was located at [facility] (Appt Dept): [address] [place] [address],  [address] [facility]. Note: not billable if this call serves to triage the patient into an appointment for the relevant concern    Romelia Mares is a 58 y.o. female evaluated via telephone on 11/10/2023 for Positive For Covid-19 (11/10/2023), Cough, and Congestion  . Assessment & Plan   1. COVID-19  2. Mild intermittent asthma with acute exacerbation    No follow-ups on file. Subjective     Patient started with symptoms yesterday and had to give herself a couple of treatments with her nebulizer and albuterol to cut down her coughing so she could sleep and today she tested herself for COVID-19 and tested positive after she heard that her grandchildren whom she keeps I come from her class of kids with COVID including the teacher. The patient has some temperature or feels like she has some temperature and is coughing and has a vicious coryza with postnasal drip and frequent cough. Review of Systems   Constitutional:  Negative for activity change, appetite change, chills, diaphoresis, fatigue, fever and unexpected weight change. HENT:  Positive for postnasal drip, rhinorrhea, sinus pressure and sore throat. Negative for congestion, ear pain, facial swelling, hearing loss and voice change. Eyes:  Negative for pain, redness and visual disturbance.    Respiratory:

## 2023-12-01 DIAGNOSIS — K31.84 GASTROPARESIS: ICD-10-CM

## 2023-12-01 RX ORDER — ONDANSETRON 8 MG/1
TABLET, ORALLY DISINTEGRATING ORAL
Qty: 30 TABLET | Refills: 2 | Status: SHIPPED | OUTPATIENT
Start: 2023-12-01

## 2023-12-13 DIAGNOSIS — I10 ESSENTIAL (PRIMARY) HYPERTENSION: ICD-10-CM

## 2023-12-13 RX ORDER — HYDROCHLOROTHIAZIDE 25 MG/1
25 TABLET ORAL DAILY
Qty: 90 TABLET | Refills: 0 | Status: SHIPPED | OUTPATIENT
Start: 2023-12-13

## 2024-01-02 ENCOUNTER — TELEPHONE (OUTPATIENT)
Age: 64
End: 2024-01-02

## 2024-01-02 NOTE — TELEPHONE ENCOUNTER
----- Message from Aide Farooq sent at 1/2/2024  8:52 AM EST -----  Subject: Appointment Request    Reason for Call: Established Patient Appointment needed: Routine Existing   Condition Follow Up    QUESTIONS    Reason for appointment request? No appointments available during search     Additional Information for Provider? patient would like to come on 1/24/24   for medication refills with provider Dar  ---------------------------------------------------------------------------  --------------  CALL BACK INFO  [phone]; Do not leave any message, patient will call back for answer  ---------------------------------------------------------------------------  --------------  SCRIPT ANSWERS

## 2024-01-09 ENCOUNTER — NURSE TRIAGE (OUTPATIENT)
Dept: OTHER | Facility: CLINIC | Age: 64
End: 2024-01-09

## 2024-01-09 NOTE — TELEPHONE ENCOUNTER
[facility]    Received call from Marycarmen at [facility] with [employer] Complaint.    Subjective: Caller states \"Back and right shoulder pain\"     Current Symptoms: Fall, low back pain, limited ROM in the shoulder because of the pain    Onset: 3 days ago    Pain Severity: 5/10    Temperature: NA     What has been tried: Advil, Flexeril, ice, heat - helps some but more sore today than the past 2 days    History related to the current reason for call: Rotator cuff problem in the past    Recommended disposition: See in Office Today or Tomorrow    Care advice provided, patient verbalizes understanding; denies any other questions or concerns; instructed to call back for any new or worsening symptoms.    Patient/Caller agrees with recommended disposition; writer provided warm transfer to Lillian at [facility] for appointment scheduling     Attention Provider:  Thank you for allowing me to participate in the care of your patient.  The patient was connected to triage in response to information provided to the [facility].  Please do not respond through this encounter as the response is not directed to a shared pool.    Reason for Disposition   MODERATE pain (e.g., interferes with normal activities) and high-risk adult (e.g., age > 60 years, osteoporosis, chronic steroid use)    Protocols used: Back Injury-ADULT-[place]

## 2024-01-22 ENCOUNTER — TELEPHONE (OUTPATIENT)
Age: 64
End: 2024-01-22

## 2024-01-22 DIAGNOSIS — M54.6 PAIN IN THORACIC SPINE: ICD-10-CM

## 2024-01-22 DIAGNOSIS — K21.9 GASTRO-ESOPHAGEAL REFLUX DISEASE WITHOUT ESOPHAGITIS: ICD-10-CM

## 2024-01-22 DIAGNOSIS — F41.1 GENERALIZED ANXIETY DISORDER: ICD-10-CM

## 2024-01-22 NOTE — TELEPHONE ENCOUNTER
Patient requesting refill on     Requested Prescriptions     Pending Prescriptions Disp Refills    ALPRAZolam (XANAX) 0.5 MG tablet [Pharmacy Med Name: ALPRAZOLAM 0.5 MG TABLET] 180 tablet 0     Sig: Take 1 tablet by mouth 2 times daily as needed for Anxiety for up to 90 days. Max Daily Amount: 1 mg        Last OV 11/10/2023     Toxassure 1/20/2022

## 2024-01-22 NOTE — TELEPHONE ENCOUNTER
Patient requesting refill on     Requested Prescriptions     Pending Prescriptions Disp Refills    cyclobenzaprine (FLEXERIL) 10 MG tablet [Pharmacy Med Name: CYCLOBENZAPRINE 10 MG TABLET] 180 tablet 1     Sig: TAKE 1 TABLET BY MOUTH 2 TIMES DAILY AS NEEDED FOR MUSCLE SPASM(S). INDICATIONS: MUSCLE SPASM    pantoprazole (PROTONIX) 40 MG tablet [Pharmacy Med Name: PANTOPRAZOLE SOD DR 40 MG TAB] 180 tablet 1     Sig: TAKE 1 TABLET BY MOUTH TWICE A DAY        Last OV 11/10/2023

## 2024-01-23 RX ORDER — ALPRAZOLAM 0.5 MG/1
0.5 TABLET ORAL 2 TIMES DAILY PRN
Qty: 60 TABLET | Refills: 0 | Status: SHIPPED | OUTPATIENT
Start: 2024-01-23 | End: 2024-04-22

## 2024-01-23 RX ORDER — PANTOPRAZOLE SODIUM 40 MG/1
TABLET, DELAYED RELEASE ORAL
Qty: 180 TABLET | Refills: 1 | Status: SHIPPED | OUTPATIENT
Start: 2024-01-23

## 2024-01-23 RX ORDER — CYCLOBENZAPRINE HCL 10 MG
TABLET ORAL
Qty: 180 TABLET | Refills: 1 | Status: SHIPPED | OUTPATIENT
Start: 2024-01-23

## 2024-01-29 ENCOUNTER — OFFICE VISIT (OUTPATIENT)
Age: 64
End: 2024-01-29
Payer: MEDICARE

## 2024-01-29 VITALS
SYSTOLIC BLOOD PRESSURE: 130 MMHG | RESPIRATION RATE: 16 BRPM | OXYGEN SATURATION: 91 % | TEMPERATURE: 97.8 F | WEIGHT: 149 LBS | HEIGHT: 62 IN | DIASTOLIC BLOOD PRESSURE: 87 MMHG | BODY MASS INDEX: 27.42 KG/M2 | HEART RATE: 72 BPM

## 2024-01-29 DIAGNOSIS — R19.4 CHANGE IN BOWEL HABITS: Primary | ICD-10-CM

## 2024-01-29 PROCEDURE — G8419 CALC BMI OUT NRM PARAM NOF/U: HCPCS | Performed by: SURGERY

## 2024-01-29 PROCEDURE — 99204 OFFICE O/P NEW MOD 45 MIN: CPT | Performed by: SURGERY

## 2024-01-29 PROCEDURE — 3017F COLORECTAL CA SCREEN DOC REV: CPT | Performed by: SURGERY

## 2024-01-29 PROCEDURE — 3075F SYST BP GE 130 - 139MM HG: CPT | Performed by: SURGERY

## 2024-01-29 PROCEDURE — 4004F PT TOBACCO SCREEN RCVD TLK: CPT | Performed by: SURGERY

## 2024-01-29 PROCEDURE — 3079F DIAST BP 80-89 MM HG: CPT | Performed by: SURGERY

## 2024-01-29 PROCEDURE — G8427 DOCREV CUR MEDS BY ELIG CLIN: HCPCS | Performed by: SURGERY

## 2024-01-29 PROCEDURE — G8484 FLU IMMUNIZE NO ADMIN: HCPCS | Performed by: SURGERY

## 2024-01-29 ASSESSMENT — PATIENT HEALTH QUESTIONNAIRE - PHQ9
2. FEELING DOWN, DEPRESSED OR HOPELESS: 0
SUM OF ALL RESPONSES TO PHQ QUESTIONS 1-9: 0
SUM OF ALL RESPONSES TO PHQ9 QUESTIONS 1 & 2: 0
SUM OF ALL RESPONSES TO PHQ QUESTIONS 1-9: 0
1. LITTLE INTEREST OR PLEASURE IN DOING THINGS: 0
SUM OF ALL RESPONSES TO PHQ QUESTIONS 1-9: 0
SUM OF ALL RESPONSES TO PHQ QUESTIONS 1-9: 0

## 2024-01-29 ASSESSMENT — ENCOUNTER SYMPTOMS: ABDOMINAL PAIN: 1

## 2024-01-29 NOTE — PROGRESS NOTES
Kim London is a 63 y.o. female who presents today with the following:  Chief Complaint   Patient presents with    New Patient    Abdominal Pain       Abdominal Pain        63-year-old female who I had seen in the remote past for separate issue who presents with change in bowel habits fluctuating between diarrhea and constipation.  She states that she contracted COVID in the fall and felt like most of her symptoms had resolved by December but during that time with COVID she developed fluctuations between diarrhea and constipation.  She was treated with an antiviral and most of her COVID symptoms have resolved but the bowel changes have not.  She may go a day or 2 without a bowel movement even taking Senokot every day and then after a bowel movement she will have watery bowel movements for days and occasionally seeing some passage of mucus.  She will then take an Imodium which will stop her diarrhea but then she will switch back to constipation.  She denies any melena or hematochezia with this.  She states that she has noticed red blood 1 time when she had a flareup of hemorrhoids with the constipation.  She also states that she develops abdominal cramping particularly when she is having the diarrheal type stools.  She has tried eating yogurt and this has not helped.  She has not tried a probiotic.  During her time with COVID she states that she lost 25 pounds.    She has a somewhat complicated GI history.  When she was in [place] she was diagnosed with gastroparesis.  She also was treated for hepatitis C.  Finally she shares that when she had her last colonoscopy in 2015 she developed nausea and vomiting from the sedation and states that she needed to be hospitalized overnight.  She does not recall what sedative she was given we do not have the records present at this point.    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  She also had an abnormal Pap smear and after the identification of the

## 2024-01-29 NOTE — PATIENT INSTRUCTIONS
Learning About Colonoscopy  What is a colonoscopy?     A colonoscopy is a test (also called a procedure) that lets a doctor look inside your large intestine. The doctor uses a thin, lighted tube called a colonoscope. The doctor uses it to look for small growths called polyps, colon or rectal cancer (colorectal cancer), or other problems like bleeding.  During the procedure, the doctor can take samples of tissue. The samples can then be checked for cancer or other conditions. The doctor can also take out polyps.  How is a colonoscopy done?  This procedure is done in a doctor's office or a clinic or hospital. You will get medicine to help you relax and not feel pain. Some people find that they don't remember having the test because of the medicine.  The doctor gently moves the colonoscope, or scope, through the colon. The scope is also a small video camera. It lets the doctor see the colon and take pictures.  How do you prepare for the procedure?  You need to clean out your colon before the procedure so the doctor can see your colon. This depends on which \"colon prep\" your doctor recommends.  To clean out your colon, you'll do a \"colon prep\" before the test. This means you stop eating solid foods and drink only clear liquids. You can have water, tea, coffee, clear juices, clear broths, flavored ice pops, and gelatin (such as Jell-O). Do not drink anything red or purple.  The day or night before the procedure, you drink a large amount of a special liquid. This causes loose, frequent stools. You will go to the bathroom a lot. Your doctor may have you drink part of the liquid the evening before and the rest on the day of the test. It's very important to drink all of the liquid. If you have problems drinking it, call your doctor.  Arrange to have someone take you home after the test.  What can you expect after a colonoscopy?  Your doctor will tell you when you can eat and do your usual activities.  Drink a lot of

## 2024-01-29 NOTE — PROGRESS NOTES
Identified pt with two pt identifiers (name and ). Reviewed chart in preparation for visit and have obtained necessary documentation.    Kim London is a 63 y.o. female New Patient and Abdominal Pain  .    There were no vitals filed for this visit.       1. Have you been to the ER, urgent care clinic since your last visit?  Hospitalized since your last visit?  no     2. Have you seen or consulted any other health care providers outside of the [facility] since your last visit?  Include any pap smears or colon screening.  no

## 2024-02-27 ENCOUNTER — OFFICE VISIT (OUTPATIENT)
Age: 64
End: 2024-02-27
Payer: MEDICARE

## 2024-02-27 ENCOUNTER — PREP FOR PROCEDURE (OUTPATIENT)
Age: 64
End: 2024-02-27

## 2024-02-27 VITALS
TEMPERATURE: 98 F | HEIGHT: 62 IN | OXYGEN SATURATION: 95 % | BODY MASS INDEX: 27.94 KG/M2 | RESPIRATION RATE: 16 BRPM | WEIGHT: 151.8 LBS | DIASTOLIC BLOOD PRESSURE: 72 MMHG | SYSTOLIC BLOOD PRESSURE: 110 MMHG | HEART RATE: 71 BPM

## 2024-02-27 VITALS
SYSTOLIC BLOOD PRESSURE: 115 MMHG | DIASTOLIC BLOOD PRESSURE: 76 MMHG | RESPIRATION RATE: 16 BRPM | BODY MASS INDEX: 27.79 KG/M2 | HEART RATE: 80 BPM | WEIGHT: 151 LBS | OXYGEN SATURATION: 95 % | TEMPERATURE: 98.3 F | HEIGHT: 62 IN

## 2024-02-27 DIAGNOSIS — R19.4 CHANGE IN BOWEL HABITS: ICD-10-CM

## 2024-02-27 DIAGNOSIS — M25.511 CHRONIC RIGHT SHOULDER PAIN: ICD-10-CM

## 2024-02-27 DIAGNOSIS — I10 ESSENTIAL (PRIMARY) HYPERTENSION: ICD-10-CM

## 2024-02-27 DIAGNOSIS — R19.4 CHANGE IN BOWEL HABITS: Primary | ICD-10-CM

## 2024-02-27 DIAGNOSIS — I10 HYPERTENSION, ESSENTIAL: ICD-10-CM

## 2024-02-27 DIAGNOSIS — G89.29 CHRONIC RIGHT SHOULDER PAIN: ICD-10-CM

## 2024-02-27 DIAGNOSIS — E78.5 DYSLIPIDEMIA: ICD-10-CM

## 2024-02-27 DIAGNOSIS — F41.1 GAD (GENERALIZED ANXIETY DISORDER): Primary | ICD-10-CM

## 2024-02-27 DIAGNOSIS — Z00.00 MEDICARE ANNUAL WELLNESS VISIT, SUBSEQUENT: ICD-10-CM

## 2024-02-27 DIAGNOSIS — F43.10 PTSD (POST-TRAUMATIC STRESS DISORDER): ICD-10-CM

## 2024-02-27 DIAGNOSIS — Z72.0 TOBACCO USE: ICD-10-CM

## 2024-02-27 DIAGNOSIS — M65.331 TRIGGER FINGER, RIGHT MIDDLE FINGER: ICD-10-CM

## 2024-02-27 PROCEDURE — 3074F SYST BP LT 130 MM HG: CPT | Performed by: SURGERY

## 2024-02-27 PROCEDURE — G8419 CALC BMI OUT NRM PARAM NOF/U: HCPCS | Performed by: SURGERY

## 2024-02-27 PROCEDURE — 4004F PT TOBACCO SCREEN RCVD TLK: CPT | Performed by: NURSE PRACTITIONER

## 2024-02-27 PROCEDURE — 3017F COLORECTAL CA SCREEN DOC REV: CPT | Performed by: SURGERY

## 2024-02-27 PROCEDURE — 3074F SYST BP LT 130 MM HG: CPT | Performed by: NURSE PRACTITIONER

## 2024-02-27 PROCEDURE — G8427 DOCREV CUR MEDS BY ELIG CLIN: HCPCS | Performed by: SURGERY

## 2024-02-27 PROCEDURE — G0439 PPPS, SUBSEQ VISIT: HCPCS | Performed by: NURSE PRACTITIONER

## 2024-02-27 PROCEDURE — 3078F DIAST BP <80 MM HG: CPT | Performed by: SURGERY

## 2024-02-27 PROCEDURE — G8484 FLU IMMUNIZE NO ADMIN: HCPCS | Performed by: SURGERY

## 2024-02-27 PROCEDURE — 3078F DIAST BP <80 MM HG: CPT | Performed by: NURSE PRACTITIONER

## 2024-02-27 PROCEDURE — G8484 FLU IMMUNIZE NO ADMIN: HCPCS | Performed by: NURSE PRACTITIONER

## 2024-02-27 PROCEDURE — 20610 DRAIN/INJ JOINT/BURSA W/O US: CPT | Performed by: NURSE PRACTITIONER

## 2024-02-27 PROCEDURE — 36415 COLL VENOUS BLD VENIPUNCTURE: CPT | Performed by: NURSE PRACTITIONER

## 2024-02-27 PROCEDURE — G8427 DOCREV CUR MEDS BY ELIG CLIN: HCPCS | Performed by: NURSE PRACTITIONER

## 2024-02-27 PROCEDURE — G8419 CALC BMI OUT NRM PARAM NOF/U: HCPCS | Performed by: NURSE PRACTITIONER

## 2024-02-27 PROCEDURE — 99214 OFFICE O/P EST MOD 30 MIN: CPT | Performed by: NURSE PRACTITIONER

## 2024-02-27 PROCEDURE — 99213 OFFICE O/P EST LOW 20 MIN: CPT | Performed by: SURGERY

## 2024-02-27 PROCEDURE — 4004F PT TOBACCO SCREEN RCVD TLK: CPT | Performed by: SURGERY

## 2024-02-27 PROCEDURE — 3017F COLORECTAL CA SCREEN DOC REV: CPT | Performed by: NURSE PRACTITIONER

## 2024-02-27 RX ORDER — ALPRAZOLAM 0.5 MG/1
0.5 TABLET ORAL 3 TIMES DAILY PRN
Qty: 270 TABLET | Refills: 0 | Status: SHIPPED | OUTPATIENT
Start: 2024-02-27 | End: 2024-05-27

## 2024-02-27 RX ORDER — TRIAMCINOLONE ACETONIDE 40 MG/ML
40 INJECTION, SUSPENSION INTRA-ARTICULAR; INTRAMUSCULAR ONCE
Status: COMPLETED | OUTPATIENT
Start: 2024-02-27 | End: 2024-02-27

## 2024-02-27 RX ORDER — NICOTINE 21 MG/24HR
PATCH, TRANSDERMAL 24 HOURS TRANSDERMAL
Qty: 30 PATCH | Refills: 3 | Status: SHIPPED | OUTPATIENT
Start: 2024-02-27 | End: 2024-02-27 | Stop reason: CLARIF

## 2024-02-27 RX ORDER — HYDROCHLOROTHIAZIDE 25 MG/1
25 TABLET ORAL DAILY
Qty: 90 TABLET | Refills: 1 | Status: SHIPPED | OUTPATIENT
Start: 2024-02-27

## 2024-02-27 RX ORDER — LIDOCAINE HYDROCHLORIDE 10 MG/ML
1 INJECTION, SOLUTION INFILTRATION; PERINEURAL ONCE
Status: COMPLETED | OUTPATIENT
Start: 2024-02-27 | End: 2024-02-27

## 2024-02-27 RX ORDER — NICOTINE 21 MG/24HR
PATCH, TRANSDERMAL 24 HOURS TRANSDERMAL
Qty: 30 PATCH | Refills: 0 | Status: SHIPPED | OUTPATIENT
Start: 2024-02-27

## 2024-02-27 RX ORDER — CARVEDILOL 12.5 MG/1
TABLET ORAL
Qty: 180 TABLET | Refills: 1 | Status: SHIPPED | OUTPATIENT
Start: 2024-02-27

## 2024-02-27 RX ADMIN — TRIAMCINOLONE ACETONIDE 40 MG: 40 INJECTION, SUSPENSION INTRA-ARTICULAR; INTRAMUSCULAR at 12:00

## 2024-02-27 RX ADMIN — LIDOCAINE HYDROCHLORIDE 1 ML: 10 INJECTION, SOLUTION INFILTRATION; PERINEURAL at 12:00

## 2024-02-27 SDOH — ECONOMIC STABILITY: FOOD INSECURITY: WITHIN THE PAST 12 MONTHS, YOU WORRIED THAT YOUR FOOD WOULD RUN OUT BEFORE YOU GOT MONEY TO BUY MORE.: NEVER TRUE

## 2024-02-27 SDOH — ECONOMIC STABILITY: FOOD INSECURITY: WITHIN THE PAST 12 MONTHS, THE FOOD YOU BOUGHT JUST DIDN'T LAST AND YOU DIDN'T HAVE MONEY TO GET MORE.: NEVER TRUE

## 2024-02-27 SDOH — ECONOMIC STABILITY: INCOME INSECURITY: HOW HARD IS IT FOR YOU TO PAY FOR THE VERY BASICS LIKE FOOD, HOUSING, MEDICAL CARE, AND HEATING?: NOT HARD AT ALL

## 2024-02-27 ASSESSMENT — PATIENT HEALTH QUESTIONNAIRE - PHQ9
SUM OF ALL RESPONSES TO PHQ QUESTIONS 1-9: 0
SUM OF ALL RESPONSES TO PHQ9 QUESTIONS 1 & 2: 0
1. LITTLE INTEREST OR PLEASURE IN DOING THINGS: 0
SUM OF ALL RESPONSES TO PHQ QUESTIONS 1-9: 0
2. FEELING DOWN, DEPRESSED OR HOPELESS: 0
SUM OF ALL RESPONSES TO PHQ9 QUESTIONS 1 & 2: 0
SUM OF ALL RESPONSES TO PHQ QUESTIONS 1-9: 0
SUM OF ALL RESPONSES TO PHQ QUESTIONS 1-9: 0
1. LITTLE INTEREST OR PLEASURE IN DOING THINGS: 0
SUM OF ALL RESPONSES TO PHQ QUESTIONS 1-9: 0
SUM OF ALL RESPONSES TO PHQ QUESTIONS 1-9: 0
2. FEELING DOWN, DEPRESSED OR HOPELESS: 0
SUM OF ALL RESPONSES TO PHQ QUESTIONS 1-9: 0
SUM OF ALL RESPONSES TO PHQ QUESTIONS 1-9: 0

## 2024-02-27 ASSESSMENT — LIFESTYLE VARIABLES
HOW MANY STANDARD DRINKS CONTAINING ALCOHOL DO YOU HAVE ON A TYPICAL DAY: PATIENT DOES NOT DRINK
HOW OFTEN DO YOU HAVE A DRINK CONTAINING ALCOHOL: NEVER

## 2024-02-27 NOTE — PROGRESS NOTES
Kim London is a 63 y.o. female who presents today with the following:  Chief Complaint   Patient presents with    Follow-up       HPI    63-year-old female who I had seen in the remote past for separate issue who presents with change in bowel habits fluctuating between diarrhea and constipation.  She states that she contracted COVID in the fall and felt like most of her symptoms had resolved by December but during that time with COVID she developed fluctuations between diarrhea and constipation.  She was treated with an antiviral and most of her COVID symptoms have resolved but the bowel changes have not.  She may go a day or 2 without a bowel movement even taking Senokot every day and then after a bowel movement she will have watery bowel movements for days and occasionally seeing some passage of mucus.  She will then take an Imodium which will stop her diarrhea but then she will switch back to constipation.  She denies any melena or hematochezia with this.  She states that she has noticed red blood 1 time when she had a flareup of hemorrhoids with the constipation.  She also states that she develops abdominal cramping particularly when she is having the diarrheal type stools.  She has tried eating yogurt and this has not helped.  She has not tried a probiotic.  During her time with COVID she states that she lost 25 pounds.  After seeing her in the initial visit in January we encouraged her to try a probiotic which she has used and has noticed marked improvement in her symptoms.  Her bowels are becoming more regular.  She has not had to use the Imodium anymore.  We also reviewed her records and it appears that her last colonoscopy was in August 2014 and was reported as normal however I am unable to obtain the report.     She has a somewhat complicated GI history.  When she was in [place] she was diagnosed with gastroparesis.  She also was treated for hepatitis C.  Finally she shares that when she had her last

## 2024-02-27 NOTE — PROGRESS NOTES
Chief Complaint   Patient presents with    Arm Problem     Can't lift right arm above shoulder    Medicare AWV       Vitals:    02/27/24 1106   BP: 110/72   Pulse: 71   Resp: 16   Temp: 98 °F (36.7 °C)   SpO2: 95%   \"Have you been to the ER, urgent care clinic since your last visit?  Hospitalized since your last visit?\"    NO    “Have you seen or consulted any other health care providers outside of  since your last visit?”    NO    “Have you had a colorectal cancer screening such as a colonoscopy/FIT/Cologuard?    NO     Have you had a mammogram?”   NO        
Patient labs drawn in right arm per NP orders. Patient tolerated well.    
Wrap up for joint injection compete  
  carvedilol (COREG) 12.5 MG tablet TAKE 1 TABLET BY MOUTH TWICE A DAY  Delmar Dodge Jr., MD   nitrofurantoin (MACRODANTIN) 50 MG capsule Take 1 capsule by mouth 4 times daily  Delmar Dodge Jr., MD   albuterol (PROVENTIL) (2.5 MG/3ML) 0.083% nebulizer solution Inhale 3 mLs into the lungs every 4 hours as needed  Automatic Reconciliation, Ar   aspirin 325 MG tablet Take 1 tablet by mouth daily  Automatic Reconciliation, Ar   dronabinol (MARINOL) 5 MG capsule Take 1 capsule by mouth as needed.  Automatic Reconciliation, Ar   nitroGLYCERIN (NITROSTAT) 0.4 MG SL tablet Place 1 tablet under the tongue  Automatic Reconciliation, Ar   prazosin (MINIPRESS) 2 MG capsule Take 1 capsule by mouth  Automatic Reconciliation, Ar       CareTeam (Including outside providers/suppliers regularly involved in providing care):   Patient Care Team:  Delmar Dodge Jr., MD as PCP - General  Delmar Dodge Jr., MD as PCP - Empaneled Provider  Feliberto Eldridge MD as Surgeon     Reviewed and updated this visit:  Tobacco  Med Hx  Surg Hx  Soc Hx  Fam Hx

## 2024-02-27 NOTE — PATIENT INSTRUCTIONS
illnesses that may run in your family, and various assessments and screenings as appropriate.    After reviewing your medical record and screening and assessments performed today your provider may have ordered immunizations, labs, imaging, and/or referrals for you.  A list of these orders (if applicable) as well as your Preventive Care list are included within your After Visit Summary for your review.    Other Preventive Recommendations:    A preventive eye exam performed by an eye specialist is recommended every 1-2 years to screen for glaucoma; cataracts, macular degeneration, and other eye disorders.  A preventive dental visit is recommended every 6 months.  Try to get at least 150 minutes of exercise per week or 10,000 steps per day on a pedometer .  Order or download the FREE \"Exercise & Physical Activity: Your Everyday Guide\" from [employer]. Call 1-724.347.7764 or search [employer] online.  You need 5071-6433 mg of calcium and 8453-8620 IU of vitamin D per day. It is possible to meet your calcium requirement with diet alone, but a vitamin D supplement is usually necessary to meet this goal.  When exposed to the sun, use a sunscreen that protects against both UVA and UVB radiation with an SPF of 30 or greater. Reapply every 2 to 3 hours or after sweating, drying off with a towel, or swimming.  Always wear a seat belt when traveling in a car. Always wear a helmet when riding a bicycle or motorcycle.

## 2024-02-27 NOTE — PATIENT INSTRUCTIONS
doctor's directions about when to stop eating solid foods and drink only clear liquids. You can drink water, clear juices, clear broths, flavored ice pops, and gelatin (such as Jell-O). Do not eat or drink anything red or purple. This includes grape juice and grape-flavored ice pops. It also includes fruit punch and cherry gelatin.     Drink the \"colon prep\" liquid as your doctor tells you. You will want to stay home, because the liquid will make you go to the bathroom a lot. Your stools will be loose and watery. It's very important to drink all of the liquid. If you have problems drinking it, call your doctor.     Do not eat any solid foods after you drink the colon prep.     Stop drinking clear liquids for a few hours before the test. Your doctor will tell you how many hours this will be.   What happens on the day of the procedure?   Follow the instructions exactly about when to stop eating and drinking. If you don't, your procedure may be canceled. If your doctor told you to take your medicines on the day of the procedure, take them with only a sip of water.     Take a bath or shower before you come in for your procedure. Do not apply lotions, perfumes, deodorants, or nail polish.     Take off all jewelry and piercings. And take out contact lenses, if you wear them.   At the doctor's office or hospital   Bring a picture ID.     You will be kept comfortable and safe by your anesthesia provider. The anesthesia may make you sleep.     You will lie on your back or your side with your knees drawn up toward your belly. The doctor will gently put a gloved finger into your anus. Then the doctor puts the scope in and moves it into your colon. The scope goes in easily because it is lubricated.     The doctor may also use small tools to take tissue samples for a biopsy or to remove polyps. This does not hurt.     The test usually takes 30 to 45 minutes. But it may take longer. It depends on what is found and what is done.

## 2024-02-28 LAB
ALBUMIN SERPL-MCNC: 4.4 G/DL (ref 3.5–5)
ALBUMIN/GLOB SERPL: 1.3 (ref 1.1–2.2)
ALP SERPL-CCNC: 64 U/L (ref 45–117)
ALT SERPL-CCNC: 31 U/L (ref 12–78)
ANION GAP SERPL CALC-SCNC: 5 MMOL/L (ref 5–15)
AST SERPL-CCNC: 20 U/L (ref 15–37)
BASOPHILS # BLD: 0.1 K/UL (ref 0–0.1)
BASOPHILS NFR BLD: 1 % (ref 0–1)
BILIRUB SERPL-MCNC: 0.5 MG/DL (ref 0.2–1)
BUN SERPL-MCNC: 21 MG/DL (ref 6–20)
BUN/CREAT SERPL: 30 (ref 12–20)
CALCIUM SERPL-MCNC: 9.6 MG/DL (ref 8.5–10.1)
CHLORIDE SERPL-SCNC: 103 MMOL/L (ref 97–108)
CHOLEST SERPL-MCNC: 203 MG/DL
CO2 SERPL-SCNC: 32 MMOL/L (ref 21–32)
CREAT SERPL-MCNC: 0.71 MG/DL (ref 0.55–1.02)
DIFFERENTIAL METHOD BLD: ABNORMAL
EOSINOPHIL # BLD: 0.3 K/UL (ref 0–0.4)
EOSINOPHIL NFR BLD: 4 % (ref 0–7)
ERYTHROCYTE [DISTWIDTH] IN BLOOD BY AUTOMATED COUNT: 14.6 % (ref 11.5–14.5)
GLOBULIN SER CALC-MCNC: 3.3 G/DL (ref 2–4)
GLUCOSE SERPL-MCNC: 97 MG/DL (ref 65–100)
HCT VFR BLD AUTO: 48.8 % (ref 35–47)
HDLC SERPL-MCNC: 56 MG/DL
HDLC SERPL: 3.6 (ref 0–5)
HGB BLD-MCNC: 16.2 G/DL (ref 11.5–16)
IMM GRANULOCYTES # BLD AUTO: 0 K/UL (ref 0–0.04)
IMM GRANULOCYTES NFR BLD AUTO: 0 % (ref 0–0.5)
LDLC SERPL CALC-MCNC: 122.2 MG/DL (ref 0–100)
LYMPHOCYTES # BLD: 2.8 K/UL (ref 0.8–3.5)
LYMPHOCYTES NFR BLD: 29 % (ref 12–49)
MCH RBC QN AUTO: 30.2 PG (ref 26–34)
MCHC RBC AUTO-ENTMCNC: 33.2 G/DL (ref 30–36.5)
MCV RBC AUTO: 90.9 FL (ref 80–99)
MONOCYTES # BLD: 0.5 K/UL (ref 0–1)
MONOCYTES NFR BLD: 5 % (ref 5–13)
NEUTS SEG # BLD: 5.9 K/UL (ref 1.8–8)
NEUTS SEG NFR BLD: 61 % (ref 32–75)
NRBC # BLD: 0 K/UL (ref 0–0.01)
NRBC BLD-RTO: 0 PER 100 WBC
PLATELET # BLD AUTO: 261 K/UL (ref 150–400)
PMV BLD AUTO: 11.2 FL (ref 8.9–12.9)
POTASSIUM SERPL-SCNC: 4 MMOL/L (ref 3.5–5.1)
PROT SERPL-MCNC: 7.7 G/DL (ref 6.4–8.2)
RBC # BLD AUTO: 5.37 M/UL (ref 3.8–5.2)
SODIUM SERPL-SCNC: 140 MMOL/L (ref 136–145)
TRIGL SERPL-MCNC: 124 MG/DL
VLDLC SERPL CALC-MCNC: 24.8 MG/DL
WBC # BLD AUTO: 9.6 K/UL (ref 3.6–11)

## 2024-03-02 LAB — DRUGS UR: NORMAL

## 2024-04-10 ENCOUNTER — TELEPHONE (OUTPATIENT)
Age: 64
End: 2024-04-10

## 2024-04-10 NOTE — TELEPHONE ENCOUNTER
Patient called stating that you told her to take Align over the counter and she wants to know which one she needs to purchase because they have different types. I told the patient that I would check with you and get back with her.

## 2024-04-17 DIAGNOSIS — K31.84 GASTROPARESIS: ICD-10-CM

## 2024-04-17 RX ORDER — ONDANSETRON 8 MG/1
TABLET, ORALLY DISINTEGRATING ORAL
Qty: 30 TABLET | Refills: 0 | Status: SHIPPED | OUTPATIENT
Start: 2024-04-17

## 2024-04-18 ENCOUNTER — OFFICE VISIT (OUTPATIENT)
Age: 64
End: 2024-04-18
Payer: MEDICARE

## 2024-04-18 VITALS
HEART RATE: 78 BPM | BODY MASS INDEX: 28.05 KG/M2 | HEIGHT: 62 IN | WEIGHT: 152.4 LBS | OXYGEN SATURATION: 94 % | RESPIRATION RATE: 16 BRPM | TEMPERATURE: 99 F | DIASTOLIC BLOOD PRESSURE: 62 MMHG | SYSTOLIC BLOOD PRESSURE: 100 MMHG

## 2024-04-18 DIAGNOSIS — R45.4 IRRITABILITY AND ANGER: ICD-10-CM

## 2024-04-18 DIAGNOSIS — Z87.891 PERSONAL HISTORY OF TOBACCO USE: ICD-10-CM

## 2024-04-18 DIAGNOSIS — F41.1 GAD (GENERALIZED ANXIETY DISORDER): ICD-10-CM

## 2024-04-18 DIAGNOSIS — J02.0 ACUTE STREPTOCOCCAL PHARYNGITIS: ICD-10-CM

## 2024-04-18 DIAGNOSIS — I10 HYPERTENSION, ESSENTIAL: ICD-10-CM

## 2024-04-18 DIAGNOSIS — F17.200 SMOKER: ICD-10-CM

## 2024-04-18 DIAGNOSIS — J02.9 SORE THROAT: Primary | ICD-10-CM

## 2024-04-18 DIAGNOSIS — I10 ESSENTIAL (PRIMARY) HYPERTENSION: ICD-10-CM

## 2024-04-18 DIAGNOSIS — F43.10 PTSD (POST-TRAUMATIC STRESS DISORDER): ICD-10-CM

## 2024-04-18 LAB
STREP PYOGENES DNA, POC: POSITIVE
VALID INTERNAL CONTROL, POC: YES

## 2024-04-18 PROCEDURE — 87651 STREP A DNA AMP PROBE: CPT | Performed by: FAMILY MEDICINE

## 2024-04-18 RX ORDER — FLUOXETINE HYDROCHLORIDE 20 MG/1
CAPSULE ORAL
Qty: 90 CAPSULE | Refills: 1 | Status: SHIPPED | OUTPATIENT
Start: 2024-04-18

## 2024-04-18 RX ORDER — CARVEDILOL 12.5 MG/1
6.25 TABLET ORAL 2 TIMES DAILY
Qty: 90 TABLET | Refills: 1
Start: 2024-04-18

## 2024-04-18 RX ORDER — AZITHROMYCIN 250 MG/1
TABLET, FILM COATED ORAL
Qty: 6 TABLET | Refills: 0 | Status: SHIPPED | OUTPATIENT
Start: 2024-04-18 | End: 2024-04-28

## 2024-04-18 RX ORDER — HYDROCHLOROTHIAZIDE 25 MG/1
12.5 TABLET ORAL DAILY
Qty: 45 TABLET | Refills: 1
Start: 2024-04-18

## 2024-04-18 RX ORDER — ALPRAZOLAM 0.5 MG/1
0.5 TABLET ORAL 3 TIMES DAILY PRN
Qty: 270 TABLET | Refills: 0 | Status: SHIPPED | OUTPATIENT
Start: 2024-04-18 | End: 2024-07-17

## 2024-04-18 SDOH — ECONOMIC STABILITY: FOOD INSECURITY: WITHIN THE PAST 12 MONTHS, THE FOOD YOU BOUGHT JUST DIDN'T LAST AND YOU DIDN'T HAVE MONEY TO GET MORE.: OFTEN TRUE

## 2024-04-18 SDOH — ECONOMIC STABILITY: HOUSING INSECURITY
IN THE LAST 12 MONTHS, WAS THERE A TIME WHEN YOU DID NOT HAVE A STEADY PLACE TO SLEEP OR SLEPT IN A SHELTER (INCLUDING NOW)?: YES

## 2024-04-18 SDOH — ECONOMIC STABILITY: FOOD INSECURITY: WITHIN THE PAST 12 MONTHS, YOU WORRIED THAT YOUR FOOD WOULD RUN OUT BEFORE YOU GOT MONEY TO BUY MORE.: OFTEN TRUE

## 2024-04-18 SDOH — ECONOMIC STABILITY: INCOME INSECURITY: HOW HARD IS IT FOR YOU TO PAY FOR THE VERY BASICS LIKE FOOD, HOUSING, MEDICAL CARE, AND HEATING?: SOMEWHAT HARD

## 2024-04-18 ASSESSMENT — ENCOUNTER SYMPTOMS
FACIAL SWELLING: 0
COLOR CHANGE: 0
EYE PAIN: 0
DIARRHEA: 0
ABDOMINAL PAIN: 0
COUGH: 0
BLOOD IN STOOL: 0
RHINORRHEA: 1
EYE REDNESS: 0
CHEST TIGHTNESS: 0
NAUSEA: 0
CONSTIPATION: 0
ABDOMINAL DISTENTION: 0
SINUS PRESSURE: 1
BACK PAIN: 0
ANAL BLEEDING: 0
VOICE CHANGE: 0
WHEEZING: 0
SORE THROAT: 1
SHORTNESS OF BREATH: 0

## 2024-04-18 ASSESSMENT — PATIENT HEALTH QUESTIONNAIRE - PHQ9
SUM OF ALL RESPONSES TO PHQ9 QUESTIONS 1 & 2: 0
SUM OF ALL RESPONSES TO PHQ QUESTIONS 1-9: 0
2. FEELING DOWN, DEPRESSED OR HOPELESS: NOT AT ALL
1. LITTLE INTEREST OR PLEASURE IN DOING THINGS: NOT AT ALL
SUM OF ALL RESPONSES TO PHQ QUESTIONS 1-9: 0

## 2024-04-18 NOTE — PROGRESS NOTES
Kim London is a 63 y.o. female who presents with the following:  Chief Complaint   Patient presents with    Pharyngitis     All 5 children have strep    Anxiety    Hypertension    Cholesterol Problem       Patient who is working hard at trying to stop smoking has not smoked in 2 months but she is still using the NicoDerm patches.  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  The patient's essential hypertension is doing well but she has frequent episodes of being lightheaded and would like to try to cut back on her hydrochlorothiazide and her [place] (Coreg).  Patient also suffers from generalized anxiety disorder and also has PTSD related to experienced childhood situations.  Patient would like to have her alprazolam refilled for 3 months.  She states that this caused her considerably less then getting it each month for 3 months.  We did speak today regarding low-dose CT for lung cancer screening and the patient is extremely interested in doing this.  She states that she is willing to do this yearly and she is more than willing to have it worked up if something is found.  The patient has noted though that since she has stopped smoking she is very easily angered and she is irritable.        Allergies   Allergen Reactions    Codeine Anaphylaxis    Morphine Anaphylaxis    Penicillins Anaphylaxis       Current Outpatient Medications   Medication Sig Dispense Refill    FLUoxetine (PROZAC) 20 MG capsule Take half a capsule daily for 5 days then increase to 1 capsule daily 90 capsule 1    ALPRAZolam (XANAX) 0.5 MG tablet Take 1 tablet by mouth 3 times daily as needed for Anxiety for up to 90 days. Max Daily Amount: 1.5 mg 270 tablet 0    azithromycin (ZITHROMAX) 250 MG tablet 500mg on day 1 followed by 250mg on days 2 - 5 6 tablet 0    carvedilol (COREG) 12.5 MG tablet Take 0.5 tablets by mouth 2 times daily 90 tablet 1

## 2024-04-18 NOTE — PROGRESS NOTES
Per Dr. Dodge's instructions:  Left detailed VM for [facility] pharmacy to cancel the previous rx for xanax by Heavenly Banda NP and only fill using the Xanax rx that was sent today by Dr. Dodge.

## 2024-04-18 NOTE — PROGRESS NOTES
Chief Complaint   Patient presents with    Pharyngitis     All 5 children have strep       Vitals:    04/18/24 0814   BP: 100/62   Pulse: 78   Resp: 16   Temp: 99 °F (37.2 °C)   SpO2: 94%   \"Have you been to the ER, urgent care clinic since your last visit?  Hospitalized since your last visit?\"    NO    “Have you seen or consulted any other health care providers outside of [facility] since your last visit?”    NO    Have you had a mammogram?”   NO    Date of last Mammogram: 6/23/2022         “Have you had a colorectal cancer screening such as a colonoscopy/FIT/Cologuard?    NO - scheduled      No colonoscopy on file  No cologuard on file  No FIT/FOBT on file   No flexible sigmoidoscopy on file         Click Here for Release of Records Request

## 2024-04-18 NOTE — PATIENT INSTRUCTIONS
[facility] Financial Resources*  (Call 211 if need more resources.)    Medical Care  [facility] Financial [facility]  What they offer: The [employer] Financial Assistance Program helps uninsured patients who do not qualify for government-sponsored health insurance and cannot afford to pay for their medical care. Insured patients may also qualify for assistance based on family income, family size, and medical needs.  Phone Number: [phone]  How to apply for the [employer] Financial Assistance Program:  Option 1: To apply for financial assistance, a patient (or their family or other provider) should fill out the Financial Assistance Application. Copies of the Financial Assistance Application and the FAP may be obtained for free by calling the [facility] customer service department at [phone].  Option 2: The Financial Assistance Application and policy may be obtained for free by downloading a copy from the [employer] website:  https://www.[employer]/patient-resources/financial-assistance  Applications are available in several languages on the website    [facility] Financial Assistance  What they offer: [facility] has a Financial Assistance Policy that provides free or discounted health care to qualified patients.  Website:  https://[employer]/patient-financial/pricing  Phone Number for Patient Benefit Advisors: [phone]    [facility] Financial Assistance  What they offer: Help with understanding a bill, finding out what insurance pays, applying for financial aid, or setting up a payment plan.  Website:  https://[employer].com/services/billing-insurance/qdklppdqs-ijjaapxbdj-eswvjfycdza  Financial Counseling Call Center: [phone]    [facility]  What they offer:   Mobile healthcare resources for uninsured patients.  Contact: [phone]        [facility]  What they offer:  Healthcare screenings and vaccines.  Contact: [phone]        [facility] ([facility])  What they offer:  Mammograms

## 2024-05-01 ENCOUNTER — HOSPITAL ENCOUNTER (OUTPATIENT)
Facility: HOSPITAL | Age: 64
Discharge: HOME | End: 2024-05-04
Attending: ORTHOPAEDIC SURGERY
Payer: MEDICARE

## 2024-05-01 DIAGNOSIS — M79.671 RIGHT FOOT PAIN: ICD-10-CM

## 2024-05-01 PROCEDURE — 73718 MRI LOWER EXTREMITY W/O DYE: CPT

## 2024-06-22 DIAGNOSIS — K31.84 GASTROPARESIS: ICD-10-CM

## 2024-06-24 RX ORDER — ONDANSETRON 8 MG/1
TABLET, ORALLY DISINTEGRATING ORAL
Qty: 30 TABLET | Refills: 0 | Status: SHIPPED | OUTPATIENT
Start: 2024-06-24

## 2024-07-18 ENCOUNTER — OFFICE VISIT (OUTPATIENT)
Age: 64
End: 2024-07-18
Payer: MEDICARE

## 2024-07-18 VITALS
DIASTOLIC BLOOD PRESSURE: 79 MMHG | WEIGHT: 154.8 LBS | OXYGEN SATURATION: 91 % | BODY MASS INDEX: 28.49 KG/M2 | TEMPERATURE: 98.4 F | SYSTOLIC BLOOD PRESSURE: 112 MMHG | HEART RATE: 74 BPM | RESPIRATION RATE: 20 BRPM | HEIGHT: 62 IN

## 2024-07-18 DIAGNOSIS — F41.1 GAD (GENERALIZED ANXIETY DISORDER): ICD-10-CM

## 2024-07-18 DIAGNOSIS — B37.31 MONILIAL VULVITIS: ICD-10-CM

## 2024-07-18 DIAGNOSIS — F43.10 PTSD (POST-TRAUMATIC STRESS DISORDER): Primary | Chronic | ICD-10-CM

## 2024-07-18 DIAGNOSIS — K21.9 GASTRO-ESOPHAGEAL REFLUX DISEASE WITHOUT ESOPHAGITIS: ICD-10-CM

## 2024-07-18 DIAGNOSIS — K31.84 GASTROPARESIS: ICD-10-CM

## 2024-07-18 DIAGNOSIS — J01.00 SUBACUTE MAXILLARY SINUSITIS: ICD-10-CM

## 2024-07-18 DIAGNOSIS — Z12.31 BREAST CANCER SCREENING BY MAMMOGRAM: ICD-10-CM

## 2024-07-18 DIAGNOSIS — J30.9 CHRONIC ALLERGIC RHINITIS: ICD-10-CM

## 2024-07-18 DIAGNOSIS — N90.7 SEBACEOUS CYST OF LABIA: ICD-10-CM

## 2024-07-18 PROCEDURE — 3078F DIAST BP <80 MM HG: CPT | Performed by: FAMILY MEDICINE

## 2024-07-18 PROCEDURE — 3074F SYST BP LT 130 MM HG: CPT | Performed by: FAMILY MEDICINE

## 2024-07-18 PROCEDURE — 4004F PT TOBACCO SCREEN RCVD TLK: CPT | Performed by: FAMILY MEDICINE

## 2024-07-18 PROCEDURE — 3017F COLORECTAL CA SCREEN DOC REV: CPT | Performed by: FAMILY MEDICINE

## 2024-07-18 PROCEDURE — 99214 OFFICE O/P EST MOD 30 MIN: CPT | Performed by: FAMILY MEDICINE

## 2024-07-18 PROCEDURE — G8419 CALC BMI OUT NRM PARAM NOF/U: HCPCS | Performed by: FAMILY MEDICINE

## 2024-07-18 PROCEDURE — G8427 DOCREV CUR MEDS BY ELIG CLIN: HCPCS | Performed by: FAMILY MEDICINE

## 2024-07-18 RX ORDER — ALPRAZOLAM 0.5 MG/1
0.5 TABLET ORAL 3 TIMES DAILY PRN
Qty: 270 TABLET | Refills: 0 | Status: SHIPPED | OUTPATIENT
Start: 2024-07-18 | End: 2024-10-16

## 2024-07-18 RX ORDER — PSEUDOEPHEDRINE HYDROCHLORIDE 60 MG/1
60 TABLET, FILM COATED ORAL EVERY 6 HOURS PRN
Qty: 40 TABLET | Refills: 0 | Status: SHIPPED | OUTPATIENT
Start: 2024-07-18

## 2024-07-18 RX ORDER — PREDNISONE 20 MG/1
TABLET ORAL
Qty: 15 TABLET | Refills: 0 | Status: SHIPPED | OUTPATIENT
Start: 2024-07-18

## 2024-07-18 RX ORDER — CLINDAMYCIN HYDROCHLORIDE 300 MG/1
300 CAPSULE ORAL 3 TIMES DAILY
Qty: 21 CAPSULE | Refills: 0 | Status: SHIPPED | OUTPATIENT
Start: 2024-07-18 | End: 2024-07-25

## 2024-07-18 RX ORDER — FLUCONAZOLE 150 MG/1
150 TABLET ORAL DAILY
Qty: 3 TABLET | Refills: 1 | Status: SHIPPED | OUTPATIENT
Start: 2024-07-18

## 2024-07-18 SDOH — ECONOMIC STABILITY: FOOD INSECURITY: WITHIN THE PAST 12 MONTHS, THE FOOD YOU BOUGHT JUST DIDN'T LAST AND YOU DIDN'T HAVE MONEY TO GET MORE.: NEVER TRUE

## 2024-07-18 SDOH — ECONOMIC STABILITY: FOOD INSECURITY: WITHIN THE PAST 12 MONTHS, YOU WORRIED THAT YOUR FOOD WOULD RUN OUT BEFORE YOU GOT MONEY TO BUY MORE.: NEVER TRUE

## 2024-07-18 SDOH — ECONOMIC STABILITY: INCOME INSECURITY: HOW HARD IS IT FOR YOU TO PAY FOR THE VERY BASICS LIKE FOOD, HOUSING, MEDICAL CARE, AND HEATING?: NOT HARD AT ALL

## 2024-07-18 ASSESSMENT — ENCOUNTER SYMPTOMS
SINUS PAIN: 1
COUGH: 0
ABDOMINAL DISTENTION: 0
BLOOD IN STOOL: 0
CHEST TIGHTNESS: 0
CONSTIPATION: 0
EYE PAIN: 0
COLOR CHANGE: 0
VOICE CHANGE: 0
SORE THROAT: 0
SINUS PRESSURE: 1
ANAL BLEEDING: 0
BACK PAIN: 0
RHINORRHEA: 1
WHEEZING: 0
FACIAL SWELLING: 0
EYE REDNESS: 0
ABDOMINAL PAIN: 0
SHORTNESS OF BREATH: 0
DIARRHEA: 0
NAUSEA: 1

## 2024-07-18 ASSESSMENT — PATIENT HEALTH QUESTIONNAIRE - PHQ9
SUM OF ALL RESPONSES TO PHQ9 QUESTIONS 1 & 2: 0
SUM OF ALL RESPONSES TO PHQ QUESTIONS 1-9: 0
SUM OF ALL RESPONSES TO PHQ QUESTIONS 1-9: 0
2. FEELING DOWN, DEPRESSED OR HOPELESS: NOT AT ALL
1. LITTLE INTEREST OR PLEASURE IN DOING THINGS: NOT AT ALL
SUM OF ALL RESPONSES TO PHQ QUESTIONS 1-9: 0
SUM OF ALL RESPONSES TO PHQ QUESTIONS 1-9: 0

## 2024-07-18 ASSESSMENT — ANXIETY QUESTIONNAIRES
2. NOT BEING ABLE TO STOP OR CONTROL WORRYING: SEVERAL DAYS
GAD7 TOTAL SCORE: 4
4. TROUBLE RELAXING: SEVERAL DAYS
7. FEELING AFRAID AS IF SOMETHING AWFUL MIGHT HAPPEN: NOT AT ALL
IF YOU CHECKED OFF ANY PROBLEMS ON THIS QUESTIONNAIRE, HOW DIFFICULT HAVE THESE PROBLEMS MADE IT FOR YOU TO DO YOUR WORK, TAKE CARE OF THINGS AT HOME, OR GET ALONG WITH OTHER PEOPLE: SOMEWHAT DIFFICULT
3. WORRYING TOO MUCH ABOUT DIFFERENT THINGS: SEVERAL DAYS
1. FEELING NERVOUS, ANXIOUS, OR ON EDGE: SEVERAL DAYS
5. BEING SO RESTLESS THAT IT IS HARD TO SIT STILL: NOT AT ALL
6. BECOMING EASILY ANNOYED OR IRRITABLE: NOT AT ALL

## 2024-07-18 NOTE — PROGRESS NOTES
Kim London is a 63 y.o. female who presents with the following:  Chief Complaint   Patient presents with    Anxiety    Nausea    Sexual Problem     Labial cyst on right    Sinusitis       Patient has generalized anxiety disorder borne from PTSD and is doing well on her fluoxetine 20 mg daily and her alprazolam 0.5 3 times a day especially while she is watching all of her grandchildren.  Patient has periodic problems with nausea for which she uses as needed Zofran 8 mg every 8 hours and this was recently filled however a pit bull 8 most of her bottle and she needs another refill.  She states that the dog did not die.  The patient's blood pressure is doing well as is her GERD and her bronchospasm and the good news is that she stop smoking and hopefully can maintain that.  She is using align and trying to drop some weight and she is taking a multivitamin.  She has been bothered chronically by allergy symptoms throughout the summer especially the past 3 weeks and she notes that she has been having more maxillary pressure and pain and is now having greenish mucus blowing out of her nose.  She also notes that she is having some pain in the maxillary sinus areas to.  The patient reports that she is having a relationship with a gentleman and she is very self-conscious about a sebaceous cyst on her right labia which she would like removed.        Allergies   Allergen Reactions    Codeine Anaphylaxis    Morphine Anaphylaxis    Penicillins Anaphylaxis       Current Outpatient Medications   Medication Sig Dispense Refill    clindamycin (CLEOCIN) 300 MG capsule Take 1 capsule by mouth 3 times daily for 7 days 21 capsule 0    pseudoephedrine (SUDAFED) 60 MG tablet Take 1 tablet by mouth every 6 hours as needed for Congestion 40 tablet 0    predniSONE (DELTASONE) 20 MG tablet Take 5 day 1 then 4 on day 2 and 3 on day 3 and 2 on day 4 and 1 on day 5 15 tablet 0    fluconazole (DIFLUCAN) 150 MG tablet Take 1 tablet by mouth

## 2024-07-18 NOTE — PROGRESS NOTES
\"Have you been to the ER, urgent care clinic since your last visit?  Hospitalized since your last visit?\"    NO    “Have you seen or consulted any other health care providers outside of [facility] since your last visit?”    NO    Have you had a mammogram?”   NO    Date of last Mammogram: 6/23/2022         “Have you had a colorectal cancer screening such as a colonoscopy/FIT/Cologuard?    NO    No colonoscopy on file  No cologuard on file  No FIT/FOBT on file   No flexible sigmoidoscopy on file         Click Here for Release of Records Request

## 2024-07-19 RX ORDER — PANTOPRAZOLE SODIUM 40 MG/1
TABLET, DELAYED RELEASE ORAL
Qty: 180 TABLET | Refills: 1 | Status: SHIPPED | OUTPATIENT
Start: 2024-07-19

## 2024-07-19 RX ORDER — ONDANSETRON 8 MG/1
TABLET, ORALLY DISINTEGRATING ORAL
Qty: 30 TABLET | Refills: 0 | Status: SHIPPED | OUTPATIENT
Start: 2024-07-19

## 2024-07-24 ENCOUNTER — PROCEDURE VISIT (OUTPATIENT)
Age: 64
End: 2024-07-24

## 2024-07-24 VITALS
BODY MASS INDEX: 28.34 KG/M2 | TEMPERATURE: 98.4 F | WEIGHT: 154 LBS | DIASTOLIC BLOOD PRESSURE: 72 MMHG | SYSTOLIC BLOOD PRESSURE: 109 MMHG | OXYGEN SATURATION: 92 % | HEART RATE: 70 BPM | HEIGHT: 62 IN | RESPIRATION RATE: 20 BRPM

## 2024-07-24 DIAGNOSIS — N90.7 SEBACEOUS CYST OF LABIA: Primary | ICD-10-CM

## 2024-07-24 ASSESSMENT — ENCOUNTER SYMPTOMS
BLOOD IN STOOL: 0
DIARRHEA: 0
ABDOMINAL DISTENTION: 0
WHEEZING: 0
SINUS PRESSURE: 0
SHORTNESS OF BREATH: 0
RHINORRHEA: 0
CONSTIPATION: 0
BACK PAIN: 0
CHEST TIGHTNESS: 0
VOICE CHANGE: 0
ABDOMINAL PAIN: 0
EYE PAIN: 0
COUGH: 0
EYE REDNESS: 0
NAUSEA: 0
ANAL BLEEDING: 0
SORE THROAT: 0
FACIAL SWELLING: 0
COLOR CHANGE: 0

## 2024-07-24 NOTE — PROGRESS NOTES
Kim London is a 63 y.o. female who presents with the following:  Chief Complaint   Patient presents with    Procedure     Remove vaginal cyst off labia       Patient is coming in today to have a sebaceous cyst is moderately large on the right labia majora drained.        Allergies   Allergen Reactions    Codeine Anaphylaxis    Morphine Anaphylaxis    Penicillins Anaphylaxis       Current Outpatient Medications   Medication Sig Dispense Refill    ondansetron (ZOFRAN-ODT) 8 MG TBDP disintegrating tablet TAKE 1 TABLET BY MOUTH EVERY 8 HOURS AS NEEDED FOR NAUSEA 30 tablet 0    pantoprazole (PROTONIX) 40 MG tablet TAKE 1 TABLET BY MOUTH TWICE A  tablet 1    clindamycin (CLEOCIN) 300 MG capsule Take 1 capsule by mouth 3 times daily for 7 days 21 capsule 0    pseudoephedrine (SUDAFED) 60 MG tablet Take 1 tablet by mouth every 6 hours as needed for Congestion 40 tablet 0    predniSONE (DELTASONE) 20 MG tablet Take 5 day 1 then 4 on day 2 and 3 on day 3 and 2 on day 4 and 1 on day 5 15 tablet 0    fluconazole (DIFLUCAN) 150 MG tablet Take 1 tablet by mouth daily 3 tablet 1    ALPRAZolam (XANAX) 0.5 MG tablet Take 1 tablet by mouth 3 times daily as needed for Sleep for up to 90 days. Max Daily Amount: 1.5 mg 270 tablet 0    FLUoxetine (PROZAC) 20 MG capsule Take half a capsule daily for 5 days then increase to 1 capsule daily 90 capsule 1    carvedilol (COREG) 12.5 MG tablet Take 0.5 tablets by mouth 2 times daily (Patient taking differently: Take 1 tablet by mouth 2 times daily) 90 tablet 1    hydroCHLOROthiazide (HYDRODIURIL) 25 MG tablet Take 0.5 tablets by mouth daily 45 tablet 1    nicotine (NICODERM CQ) 14 MG/24HR Apply 1 new patch to the skin every 24 hours. May remove at nighttime if skin irritation occurs. Try to reduce to 7mg patch after 2-4 weeks. 30 patch 0    cyclobenzaprine (FLEXERIL) 10 MG tablet TAKE 1 TABLET BY MOUTH 2 TIMES DAILY AS NEEDED FOR MUSCLE SPASM(S). INDICATIONS: MUSCLE SPASM 180 tablet  Yes

## 2024-07-24 NOTE — PROGRESS NOTES
\"Have you been to the ER, urgent care clinic since your last visit?  Hospitalized since your last visit?\"    NO    “Have you seen or consulted any other health care providers outside of [facility] since your last visit?”    NO    Have you had a mammogram?”   NO    Date of last Mammogram: 6/23/2022         “Have you had a colorectal cancer screening such as a colonoscopy/FIT/Cologuard?    NO    No colonoscopy on file  No cologuard on file  No FIT/FOBT on file   No flexible sigmoidoscopy on file         Click Here for Release of Records Request

## 2024-07-24 NOTE — PROGRESS NOTES
[facility]  OFFICE PROCEDURE TIMEOUT NOTE        Chart reviewed for the following:   Jessy CARRERA LPN, have reviewed the History, Physical and updated the Allergic reactions for Kim A Green     TIME OUT performed immediately prior to start of procedure:   Jessy CARRERA LPN, have performed the following reviews on Kim A Green prior to the start of the procedure:            * Patient was identified by name and date of birth   * Agreement on procedure being performed was verified  * Risks and Benefits explained to the patient by Delmar Dodge MD  * Procedure site verified and marked as necessary  * Patient was positioned for comfort  * Consent was signed and verified     Time: 0320      Date of procedure: 7/24/2024    Procedure performed by:  Delmar Dodge MD    Provider assisted by:   soheila    Patient assisted by: self    Pre Procedural Pain Scale: 2    Procedure start time:  0325    Procedure end time:  0330    How tolerated by patient: tolerated the procedure well with no complications    Post Procedural Pain Scale: 0 - No Hurt    Comments: none    Post op instructions and patient education reviewed.  Patient states understanding.    Copy of discharge instructions given to patient in AVS.

## 2024-07-30 DIAGNOSIS — J01.00 SUBACUTE MAXILLARY SINUSITIS: ICD-10-CM

## 2024-07-30 RX ORDER — CLINDAMYCIN HCL 300 MG
CAPSULE ORAL
Qty: 21 CAPSULE | Refills: 0 | OUTPATIENT
Start: 2024-07-30

## 2024-08-20 DIAGNOSIS — K31.84 GASTROPARESIS: ICD-10-CM

## 2024-08-20 RX ORDER — ONDANSETRON 8 MG/1
TABLET, ORALLY DISINTEGRATING ORAL
Qty: 30 TABLET | Refills: 0 | Status: SHIPPED | OUTPATIENT
Start: 2024-08-20

## 2024-09-25 DIAGNOSIS — M54.6 PAIN IN THORACIC SPINE: ICD-10-CM

## 2024-09-25 DIAGNOSIS — K31.84 GASTROPARESIS: ICD-10-CM

## 2024-09-25 RX ORDER — CYCLOBENZAPRINE HCL 10 MG
TABLET ORAL
Qty: 180 TABLET | Refills: 1 | Status: SHIPPED | OUTPATIENT
Start: 2024-09-25

## 2024-09-25 RX ORDER — ONDANSETRON 8 MG/1
TABLET, ORALLY DISINTEGRATING ORAL
Qty: 30 TABLET | Refills: 0 | Status: SHIPPED | OUTPATIENT
Start: 2024-09-25

## 2024-10-14 ENCOUNTER — OFFICE VISIT (OUTPATIENT)
Age: 64
End: 2024-10-14
Payer: MEDICARE

## 2024-10-14 VITALS
SYSTOLIC BLOOD PRESSURE: 94 MMHG | TEMPERATURE: 97.5 F | HEIGHT: 62 IN | OXYGEN SATURATION: 97 % | BODY MASS INDEX: 26.71 KG/M2 | WEIGHT: 145.13 LBS | RESPIRATION RATE: 18 BRPM | HEART RATE: 74 BPM | DIASTOLIC BLOOD PRESSURE: 60 MMHG

## 2024-10-14 DIAGNOSIS — F43.10 PTSD (POST-TRAUMATIC STRESS DISORDER): Chronic | ICD-10-CM

## 2024-10-14 DIAGNOSIS — Z87.891 PERSONAL HISTORY OF TOBACCO USE: ICD-10-CM

## 2024-10-14 DIAGNOSIS — M54.50 CHRONIC BILATERAL LOW BACK PAIN WITHOUT SCIATICA: ICD-10-CM

## 2024-10-14 DIAGNOSIS — J45.20 MILD INTERMITTENT ASTHMA, UNCOMPLICATED: ICD-10-CM

## 2024-10-14 DIAGNOSIS — I10 HYPERTENSION, ESSENTIAL: ICD-10-CM

## 2024-10-14 DIAGNOSIS — F41.1 GAD (GENERALIZED ANXIETY DISORDER): Primary | ICD-10-CM

## 2024-10-14 DIAGNOSIS — G89.29 CHRONIC BILATERAL LOW BACK PAIN WITHOUT SCIATICA: ICD-10-CM

## 2024-10-14 DIAGNOSIS — Z23 ENCOUNTER FOR ADMINISTRATION OF VACCINE: ICD-10-CM

## 2024-10-14 DIAGNOSIS — K31.84 GASTROPARESIS: ICD-10-CM

## 2024-10-14 DIAGNOSIS — K21.9 GASTRO-ESOPHAGEAL REFLUX DISEASE WITHOUT ESOPHAGITIS: ICD-10-CM

## 2024-10-14 PROCEDURE — G0009 ADMIN PNEUMOCOCCAL VACCINE: HCPCS | Performed by: NURSE PRACTITIONER

## 2024-10-14 PROCEDURE — 3017F COLORECTAL CA SCREEN DOC REV: CPT | Performed by: NURSE PRACTITIONER

## 2024-10-14 PROCEDURE — 99214 OFFICE O/P EST MOD 30 MIN: CPT | Performed by: NURSE PRACTITIONER

## 2024-10-14 PROCEDURE — 3078F DIAST BP <80 MM HG: CPT | Performed by: NURSE PRACTITIONER

## 2024-10-14 PROCEDURE — G8419 CALC BMI OUT NRM PARAM NOF/U: HCPCS | Performed by: NURSE PRACTITIONER

## 2024-10-14 PROCEDURE — G8482 FLU IMMUNIZE ORDER/ADMIN: HCPCS | Performed by: NURSE PRACTITIONER

## 2024-10-14 PROCEDURE — 1123F ACP DISCUSS/DSCN MKR DOCD: CPT | Performed by: NURSE PRACTITIONER

## 2024-10-14 PROCEDURE — 90677 PCV20 VACCINE IM: CPT | Performed by: NURSE PRACTITIONER

## 2024-10-14 PROCEDURE — 90653 IIV ADJUVANT VACCINE IM: CPT | Performed by: NURSE PRACTITIONER

## 2024-10-14 PROCEDURE — 1090F PRES/ABSN URINE INCON ASSESS: CPT | Performed by: NURSE PRACTITIONER

## 2024-10-14 PROCEDURE — G0008 ADMIN INFLUENZA VIRUS VAC: HCPCS | Performed by: NURSE PRACTITIONER

## 2024-10-14 PROCEDURE — 3074F SYST BP LT 130 MM HG: CPT | Performed by: NURSE PRACTITIONER

## 2024-10-14 PROCEDURE — G8427 DOCREV CUR MEDS BY ELIG CLIN: HCPCS | Performed by: NURSE PRACTITIONER

## 2024-10-14 PROCEDURE — G8400 PT W/DXA NO RESULTS DOC: HCPCS | Performed by: NURSE PRACTITIONER

## 2024-10-14 PROCEDURE — 4004F PT TOBACCO SCREEN RCVD TLK: CPT | Performed by: NURSE PRACTITIONER

## 2024-10-14 PROCEDURE — 36415 COLL VENOUS BLD VENIPUNCTURE: CPT | Performed by: NURSE PRACTITIONER

## 2024-10-14 RX ORDER — ONDANSETRON 8 MG/1
8 TABLET, ORALLY DISINTEGRATING ORAL EVERY 8 HOURS PRN
Qty: 30 TABLET | Refills: 0 | Status: SHIPPED | OUTPATIENT
Start: 2024-10-14

## 2024-10-14 RX ORDER — CARVEDILOL 12.5 MG/1
6.25 TABLET ORAL DAILY
Qty: 45 TABLET | Refills: 1
Start: 2024-10-14

## 2024-10-14 RX ORDER — GABAPENTIN 600 MG/1
600 TABLET ORAL 2 TIMES DAILY
Qty: 180 TABLET | Refills: 0 | Status: SHIPPED | OUTPATIENT
Start: 2024-10-14 | End: 2025-01-12

## 2024-10-14 RX ORDER — ALPRAZOLAM 0.5 MG
0.5 TABLET ORAL 3 TIMES DAILY PRN
Qty: 270 TABLET | Refills: 0 | Status: SHIPPED | OUTPATIENT
Start: 2024-10-14 | End: 2025-01-12

## 2024-10-14 ASSESSMENT — PATIENT HEALTH QUESTIONNAIRE - PHQ9
SUM OF ALL RESPONSES TO PHQ QUESTIONS 1-9: 0
SUM OF ALL RESPONSES TO PHQ QUESTIONS 1-9: 0
1. LITTLE INTEREST OR PLEASURE IN DOING THINGS: NOT AT ALL
SUM OF ALL RESPONSES TO PHQ9 QUESTIONS 1 & 2: 0
2. FEELING DOWN, DEPRESSED OR HOPELESS: NOT AT ALL
SUM OF ALL RESPONSES TO PHQ QUESTIONS 1-9: 0
SUM OF ALL RESPONSES TO PHQ QUESTIONS 1-9: 0

## 2024-10-14 ASSESSMENT — ENCOUNTER SYMPTOMS
RESPIRATORY NEGATIVE: 1
BACK PAIN: 1

## 2024-10-14 NOTE — PATIENT INSTRUCTIONS
provider.  Adverse reactions should be reported to the Vaccine Adverse Event Reporting System (VAERS). Your health care provider will usually file this report, or you can do it yourself. Visit the VAERS website at www.vaers.[facility].gov or call 1-951.338.8011. VAERS is only for reporting reactions, and VAERS staff members do not give medical advice.  The National Vaccine Injury Compensation Program  The National Vaccine Injury Compensation Program (VICP) is a federal program that was created to compensate people who may have been injured by certain vaccines. Claims regarding alleged injury or death due to vaccination have a time limit for filing, which may be as short as two years. Visit the VICP website at www.[facility].gov/vaccinecompensation or call 1-142.843.5005 to learn about the program and about filing a claim.  How can I learn more?  Ask your health care provider.  Call your local or state health department.  Visit the website of the Food and Drug Administration (FDA) for vaccine package inserts and additional information at www.fda.gov/vaccines-blood-biologics/vaccines.  Contact the Centers for Disease Control and Prevention (CDC):  Call 1-428.368.3095 (8-919-TJR-INFO) or  Visit CDC's website at www.cdc.gov/flu.  Vaccine Information Statement  Inactivated Influenza Vaccine  8/6/2021  42 U.S.C. § 300aa-26  Department of Health and Human Services  Centers for Disease Control and Prevention  Many vaccine information statements are available in Hebrew and other languages. See www.immunize.org/vis.  Hojas de información sobre vacunas están disponibles en español y en muchos otros idiomas. Visite www.immunize.org/vis.  Care instructions adapted under license by [employer]. If you have questions about a medical condition or this instruction, always ask your healthcare professional. [employer] disclaims any warranty or liability for your use of this information.

## 2024-10-14 NOTE — PROGRESS NOTES
\"Have you been to the ER, urgent care clinic since your last visit?  Hospitalized since your last visit?\"    NO    “Have you seen or consulted any other health care providers outside our system since your last visit?”    NO    Have you had a mammogram?”   NO    Date of last Mammogram: 6/23/2022       “Have you had a colorectal cancer screening such as a colonoscopy/FIT/Cologuard?    NO will reschedule     No colonoscopy on file  No cologuard on file  No FIT/FOBT on file   No flexible sigmoidoscopy on file

## 2024-10-14 NOTE — PROGRESS NOTES
Kim London is a 64 y.o. female who presents today with c/o   Chief Complaint   Patient presents with    Follow-up     Anxiety, GERD, hypertension, asthma, gastroparesis, PTSD, wants flu and pneumonia shot           Assessment/ Plan:       ASSESSMENT AND PLAN  1. NEREIDA (generalized anxiety disorder)  Continue xanax--refilled  Substance agreement signed  Toxassure complete for 2024  - ALPRAZolam (XANAX) 0.5 MG tablet; Take 1 tablet by mouth 3 times daily as needed for Sleep for up to 90 days. Max Daily Amount: 1.5 mg  Dispense: 270 tablet; Refill: 0    2. Gastro-esophageal reflux disease without esophagitis  Stable    3. Hypertension, essential  Decrease coreg as discussed  Stop hctz  Continue to monitor B/P at home  - CBC with Auto Differential; Future  - Comprehensive Metabolic Panel; Future  - Lipid Panel; Future  - TSH; Future  - COLLECTION VENOUS BLOOD,VENIPUNCTURE    4. Mild intermittent asthma, uncomplicated  Stable     Encounter for administration of vaccine  Admin as requested  - Influenza, FLUAD Trivalent, (age 65 y+), IM, Preservative Free, 0.5mL  - Pneumococcal, PCV20, PREVNAR 20, (age 6w+), IM, PF    Gastroparesis  Continue zofran  - ondansetron (ZOFRAN-ODT) 8 MG TBDP disintegrating tablet; Take 1 tablet by mouth every 8 hours as needed for Nausea for nausea  Dispense: 30 tablet; Refill: 0    PTSD (post-traumatic stress disorder)  Continue xanax  - ALPRAZolam (XANAX) 0.5 MG tablet; Take 1 tablet by mouth 3 times daily as needed for Sleep for up to 90 days. Max Daily Amount: 1.5 mg  Dispense: 270 tablet; Refill: 0    Back pain  Continue flexeril and gabapentin since these are working well  - gabapentin (NEURONTIN) 600 MG tablet; Take 1 tablet by mouth 2 times daily for 90 days. Max Daily Amount: 1,200 mg  Dispense: 180 tablet; Refill: 0      Return in about 3 months (around 1/14/2025) for Xanax refill.   Check labs today return in 3 months as discussed    Subjective:  Kim London is a 64 y.o. female

## 2024-10-15 LAB
ALBUMIN SERPL-MCNC: 3.6 G/DL (ref 3.5–5)
ALBUMIN/GLOB SERPL: 0.9 (ref 1.1–2.2)
ALP SERPL-CCNC: 72 U/L (ref 45–117)
ALT SERPL-CCNC: 25 U/L (ref 12–78)
ANION GAP SERPL CALC-SCNC: 7 MMOL/L (ref 2–12)
AST SERPL-CCNC: 11 U/L (ref 15–37)
BASOPHILS # BLD: 0.1 K/UL (ref 0–0.1)
BASOPHILS NFR BLD: 1 % (ref 0–1)
BILIRUB SERPL-MCNC: 0.3 MG/DL (ref 0.2–1)
BUN SERPL-MCNC: 11 MG/DL (ref 6–20)
BUN/CREAT SERPL: 16 (ref 12–20)
CALCIUM SERPL-MCNC: 9 MG/DL (ref 8.5–10.1)
CHLORIDE SERPL-SCNC: 103 MMOL/L (ref 97–108)
CHOLEST SERPL-MCNC: 155 MG/DL
CO2 SERPL-SCNC: 31 MMOL/L (ref 21–32)
CREAT SERPL-MCNC: 0.7 MG/DL (ref 0.55–1.02)
DIFFERENTIAL METHOD BLD: ABNORMAL
EOSINOPHIL # BLD: 0.3 K/UL (ref 0–0.4)
EOSINOPHIL NFR BLD: 4 % (ref 0–7)
ERYTHROCYTE [DISTWIDTH] IN BLOOD BY AUTOMATED COUNT: 13.6 % (ref 11.5–14.5)
GLOBULIN SER CALC-MCNC: 3.9 G/DL (ref 2–4)
GLUCOSE SERPL-MCNC: 109 MG/DL (ref 65–100)
HCT VFR BLD AUTO: 44.7 % (ref 35–47)
HDLC SERPL-MCNC: 36 MG/DL
HDLC SERPL: 4.3 (ref 0–5)
HGB BLD-MCNC: 14.3 G/DL (ref 11.5–16)
IMM GRANULOCYTES # BLD AUTO: 0.1 K/UL (ref 0–0.04)
IMM GRANULOCYTES NFR BLD AUTO: 1 % (ref 0–0.5)
LDLC SERPL CALC-MCNC: 97.8 MG/DL (ref 0–100)
LYMPHOCYTES # BLD: 1.4 K/UL (ref 0.8–3.5)
LYMPHOCYTES NFR BLD: 19 % (ref 12–49)
MCH RBC QN AUTO: 29.2 PG (ref 26–34)
MCHC RBC AUTO-ENTMCNC: 32 G/DL (ref 30–36.5)
MCV RBC AUTO: 91.4 FL (ref 80–99)
MONOCYTES # BLD: 0.4 K/UL (ref 0–1)
MONOCYTES NFR BLD: 5 % (ref 5–13)
NEUTS SEG # BLD: 5.4 K/UL (ref 1.8–8)
NEUTS SEG NFR BLD: 70 % (ref 32–75)
NRBC # BLD: 0 K/UL (ref 0–0.01)
NRBC BLD-RTO: 0 PER 100 WBC
PLATELET # BLD AUTO: 359 K/UL (ref 150–400)
PMV BLD AUTO: 11.1 FL (ref 8.9–12.9)
POTASSIUM SERPL-SCNC: 3.5 MMOL/L (ref 3.5–5.1)
PROT SERPL-MCNC: 7.5 G/DL (ref 6.4–8.2)
RBC # BLD AUTO: 4.89 M/UL (ref 3.8–5.2)
SODIUM SERPL-SCNC: 141 MMOL/L (ref 136–145)
TRIGL SERPL-MCNC: 106 MG/DL
TSH SERPL DL<=0.05 MIU/L-ACNC: 1.08 UIU/ML (ref 0.36–3.74)
VLDLC SERPL CALC-MCNC: 21.2 MG/DL
WBC # BLD AUTO: 7.7 K/UL (ref 3.6–11)

## 2024-11-06 NOTE — BH NOTE
[facility]  Department of Behavioral Health  Jojo [place], [facility]    ADULT OUTPATIENT PSYCHIATRIC EVALUATION        Name: Kim London        MRN:  [id number]     Time In: 1045         Time Out: 1150    Date: 11/6/2024         Collateral: none    Patient Identification: Kim is a 64 year old   female who is retired. 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 She now watches her four grandchildren, who are diagnosed with autism. She lives on a property on which she built a tiny house for her son and his family but her grandchildren generally stay with her in her home. She he is engaged to Prince Ángel Gay (sp.?) who is a 48 year old [place] who works as a  for the [employer].  Engaged, hasn't set a date but her fiancee is thinking about Valentines Day. He lives in [place] and [place], is , and has two children, one in [place] and one in [place]. She has met four of his six brothers. Kim's son, Eddie London, is seen here at [facility]. She also has a daughter named Rae who studies Romansh Linguistics at [employer].     Chief Complaint / Reason for Referral: Referred by her PCP for evaluation of anxiety, depression, and PTSD.      History Of Present Illness: Kim was treated for her mental health by Dr. Dodge for several years and now sees Vanessa Duarte. She used to spend an hour at a time with Dr. Dodge \"chatting\" and she isn't as comfortable with having a new provider. Kim is treated for anxiety with alprazolam, which she can take TID but \"usually don't need to.\" She does feel she need an increase, as the medication is not working as well as it has in the past. She has been under increased pressure since starting a new relationship, while helping care for her five grandchildren in her home. Her son, the father of only the

## 2024-11-07 ENCOUNTER — HOSPITAL ENCOUNTER (OUTPATIENT)
Facility: HOSPITAL | Age: 64
Discharge: HOME | End: 2024-11-10
Payer: MEDICARE

## 2024-11-07 DIAGNOSIS — F41.1 GENERALIZED ANXIETY DISORDER: ICD-10-CM

## 2024-11-07 DIAGNOSIS — F43.21 SITUATIONAL DEPRESSION: Primary | ICD-10-CM

## 2024-11-07 PROCEDURE — 90792 PSYCH DIAG EVAL W/MED SRVCS: CPT | Performed by: MIDWIFE

## 2024-12-08 NOTE — BH NOTE
well.     Her boyfriend from [facility] plans to visit at the end of December and has been sending lots of romantic text messages. She is feeling very happy with his attentions.    Medication trials:  Prozac: caused increased anxiety and \"not feeling like myself\" after taking it for a few days; self-discontinued  Xanax 0.5 mg TID: current/\"for many years\" Takes for sleep and anxiety  Gabapentin 600 mg BID for back pain: current rx but only takes 2-3 times a week  Flexeril for back pain/spasms: current  Aleve for back pain: effective  Allergic to narcotic pain relievers    REVIEW OF SYSTEMS: Pertinent items are noted in the History of Present Illness. All other Systems reviewed and are considered negative.    Mental Status Examination:    I. Reliability in Providing Information: Good      II. Personal Presentation: Looks stated age; dressed appropriately      III. Motor Activity: Normal       IV. Speech Pattern:  Normal      V. Mood: Euthymic      Vl. Eye Contact:  Appropriate       Vll. Affect: Appropriate       VllI. Thought Processes        Thought Process:  goal-directed and logical          Thought Content: No delusions, phobias, obsessions, or compulsions        Hallucinations: None        Suicidal Ideation/Attempts: No         Homicidal Ideation/Attempts: No         Self-harm: No           IX. Cognitive Functions       Orientation Level:  Oriented x4         Neurologic State: Alert         Attention/Concentration: Attentive       Abstract Thinking: Intact        Estimate of Intelligence: Average        Judgment/insight: Good         Memory/concentration: Short/long-term intact                 X.Risks: None evident                 XI. Strengths and Assets Inventory:   Intelligence  Cooperative  Employment status: adequate   Living arrangements: stable  Interests/Hobbies    LAB DATA: no orders placed today    Assessment: Kim is doing well with sertraline; no medication changes are recommended at this

## 2024-12-09 ENCOUNTER — HOSPITAL ENCOUNTER (OUTPATIENT)
Facility: HOSPITAL | Age: 64
Discharge: HOME | End: 2024-12-12
Payer: MEDICARE

## 2024-12-09 DIAGNOSIS — F43.21 SITUATIONAL DEPRESSION: Primary | ICD-10-CM

## 2024-12-09 DIAGNOSIS — F41.1 GENERALIZED ANXIETY DISORDER: ICD-10-CM

## 2024-12-09 PROCEDURE — 99214 OFFICE O/P EST MOD 30 MIN: CPT | Performed by: MIDWIFE

## 2024-12-18 ENCOUNTER — TELEPHONE (OUTPATIENT)
Age: 64
End: 2024-12-18

## 2024-12-18 NOTE — TELEPHONE ENCOUNTER
----- Message from Jayce TAN sent at 12/18/2024 11:13 AM EST -----  Regarding: [facility] Appointment Request  [facility] Appointment Request    Patient needs appointment for [facility] Appointment Type: Existing Condition Follow Up.    Patient Requested Dates(s): Before January 05, 2025, or As soon as possible  Patient Requested Time: Afternoon  Provider Name: Vanessa Duarte APRN    Reason for Appointment Request: Established Patient - Available appointments did not meet patient need  --------------------------------------------------------------------------------------------------------------------------    Relationship to Patient: Self     Call Back Information: OK to leave message on voicemail  Preferred Call Back Number: Phone [phone]

## 2024-12-20 DIAGNOSIS — J45.20 MILD INTERMITTENT ASTHMA, UNCOMPLICATED: ICD-10-CM

## 2024-12-20 NOTE — TELEPHONE ENCOUNTER
Pharmacy refill request for:    albuterol sulfate HFA (PROVENTIL;VENTOLIN;PROAIR) 108 (90 Base) MCG/ACT inhaler [[id number]]     Lov 10/4/24    [facility]/PHARMACY #[id number] - [place] [address] - [address] [phone] - F [phone] [[id number]]

## 2024-12-23 RX ORDER — ALBUTEROL SULFATE 90 UG/1
2 INHALANT RESPIRATORY (INHALATION) EVERY 4 HOURS PRN
Qty: 6.7 EACH | Refills: 5 | Status: SHIPPED | OUTPATIENT
Start: 2024-12-23

## 2024-12-31 ASSESSMENT — ENCOUNTER SYMPTOMS: RESPIRATORY NEGATIVE: 1

## 2024-12-31 NOTE — PROGRESS NOTES
Assessment/ Plan:       ASSESSMENT AND PLAN  1. NEREIDA (generalized anxiety disorder)  Continue xanax--refilled  Complete substance agreement and toxassure in 3 months  - ALPRAZolam (XANAX) 0.5 MG tablet; Take 1 tablet by mouth 3 times daily as needed for Sleep for up to 90 days. Max Daily Amount: 1.5 mg  Dispense: 90 tablet; Refill: 2    2. Gastro-esophageal reflux disease without esophagitis  Stable    3. Hypertension, essential  Stable    4. Mild intermittent asthma, uncomplicated  Stable    Gastroparesis  Continue zofran  - ondansetron (ZOFRAN-ODT) 8 MG TBDP disintegrating tablet; Take 1 tablet by mouth every 8 hours as needed for Nausea for nausea  Dispense: 30 tablet; Refill: 0    PTSD (post-traumatic stress disorder)  Continue xanax  Continue sertraline  - ALPRAZolam (XANAX) 0.5 MG tablet; Take 1 tablet by mouth 3 times daily as needed for Sleep for up to 90 days. Max Daily Amount: 1.5 mg  Dispense: 90 tablet; Refill: 2    Back pain  Continue flexeril and gabapentin since these are working well  - gabapentin (NEURONTIN) 600 MG tablet; Take 1 tablet by mouth 2 times daily for 90 days. Max Daily Amount: 1,200 mg  Dispense: 180 tablet; Refill: 0      Return in about 3 months (around 4/2/2025) for xanax refill.   RTO in 3 months for refills.  Straightforward visit today    Subjective:  Kim London is a 64 y.o. 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     GERD  Taking Protonix 40 mg twice daily.  Tolerating well.  No recent problems.  No history of kidney disease.    Depression  PTSD  Taking sertraline.  Tolerating well.  She is also taking Xanax 0.5 mg 3 times daily.  She does need a refill on this as well.  We discussed trying BuSpar but she tells me that this is only thing that works for her and she is not able to try anything else at this

## 2025-01-02 ENCOUNTER — OFFICE VISIT (OUTPATIENT)
Age: 65
End: 2025-01-02

## 2025-01-02 VITALS
RESPIRATION RATE: 16 BRPM | OXYGEN SATURATION: 93 % | HEIGHT: 62 IN | TEMPERATURE: 97.5 F | BODY MASS INDEX: 25.1 KG/M2 | WEIGHT: 136.4 LBS | SYSTOLIC BLOOD PRESSURE: 127 MMHG | HEART RATE: 63 BPM | DIASTOLIC BLOOD PRESSURE: 83 MMHG

## 2025-01-02 DIAGNOSIS — G89.29 CHRONIC BILATERAL LOW BACK PAIN WITHOUT SCIATICA: ICD-10-CM

## 2025-01-02 DIAGNOSIS — K31.84 GASTROPARESIS: ICD-10-CM

## 2025-01-02 DIAGNOSIS — J45.20 MILD INTERMITTENT ASTHMA, UNCOMPLICATED: ICD-10-CM

## 2025-01-02 DIAGNOSIS — K21.9 GASTRO-ESOPHAGEAL REFLUX DISEASE WITHOUT ESOPHAGITIS: ICD-10-CM

## 2025-01-02 DIAGNOSIS — F41.1 GAD (GENERALIZED ANXIETY DISORDER): Primary | ICD-10-CM

## 2025-01-02 DIAGNOSIS — I10 HYPERTENSION, ESSENTIAL: ICD-10-CM

## 2025-01-02 DIAGNOSIS — M54.50 CHRONIC BILATERAL LOW BACK PAIN WITHOUT SCIATICA: ICD-10-CM

## 2025-01-02 DIAGNOSIS — F43.10 PTSD (POST-TRAUMATIC STRESS DISORDER): ICD-10-CM

## 2025-01-02 RX ORDER — ONDANSETRON 8 MG/1
8 TABLET, ORALLY DISINTEGRATING ORAL EVERY 8 HOURS PRN
Qty: 30 TABLET | Refills: 2 | Status: SHIPPED | OUTPATIENT
Start: 2025-01-02

## 2025-01-02 RX ORDER — ALPRAZOLAM 0.5 MG
0.5 TABLET ORAL 3 TIMES DAILY PRN
Qty: 90 TABLET | Refills: 2 | Status: SHIPPED | OUTPATIENT
Start: 2025-01-02 | End: 2025-01-02

## 2025-01-02 RX ORDER — ONDANSETRON 8 MG/1
8 TABLET, ORALLY DISINTEGRATING ORAL EVERY 8 HOURS PRN
COMMUNITY
End: 2025-01-02 | Stop reason: SDUPTHER

## 2025-01-02 RX ORDER — ALPRAZOLAM 0.5 MG
0.5 TABLET ORAL 3 TIMES DAILY PRN
Qty: 90 TABLET | Refills: 2 | Status: SHIPPED | OUTPATIENT
Start: 2025-01-02 | End: 2025-04-02

## 2025-01-02 SDOH — ECONOMIC STABILITY: FOOD INSECURITY: WITHIN THE PAST 12 MONTHS, YOU WORRIED THAT YOUR FOOD WOULD RUN OUT BEFORE YOU GOT MONEY TO BUY MORE.: NEVER TRUE

## 2025-01-02 SDOH — ECONOMIC STABILITY: FOOD INSECURITY: WITHIN THE PAST 12 MONTHS, THE FOOD YOU BOUGHT JUST DIDN'T LAST AND YOU DIDN'T HAVE MONEY TO GET MORE.: NEVER TRUE

## 2025-01-02 SDOH — ECONOMIC STABILITY: INCOME INSECURITY: HOW HARD IS IT FOR YOU TO PAY FOR THE VERY BASICS LIKE FOOD, HOUSING, MEDICAL CARE, AND HEATING?: NOT HARD AT ALL

## 2025-01-02 ASSESSMENT — PATIENT HEALTH QUESTIONNAIRE - PHQ9
1. LITTLE INTEREST OR PLEASURE IN DOING THINGS: NOT AT ALL
SUM OF ALL RESPONSES TO PHQ QUESTIONS 1-9: 0
SUM OF ALL RESPONSES TO PHQ QUESTIONS 1-9: 0
SUM OF ALL RESPONSES TO PHQ9 QUESTIONS 1 & 2: 0
SUM OF ALL RESPONSES TO PHQ QUESTIONS 1-9: 0
SUM OF ALL RESPONSES TO PHQ QUESTIONS 1-9: 0
2. FEELING DOWN, DEPRESSED OR HOPELESS: NOT AT ALL

## 2025-01-02 ASSESSMENT — ENCOUNTER SYMPTOMS
EYES NEGATIVE: 1
BACK PAIN: 1

## 2025-01-02 NOTE — PROGRESS NOTES
Chief Complaint   Patient presents with    Anxiety       Vitals:    01/02/25 1035   BP: 127/83   Pulse: 63   Resp: 16   Temp: 97.5 °F (36.4 °C)   SpO2: 93%   \"Have you been to the ER, urgent care clinic since your last visit?  Hospitalized since your last visit?\"    NO    “Have you seen or consulted any other health care providers outside our system since your last visit?”    NO    Have you had a mammogram?”   NO    Date of last Mammogram: 6/23/2022       “Have you had a colorectal cancer screening such as a colonoscopy/FIT/Cologuard?    NO    No colonoscopy on file  No cologuard on file  No FIT/FOBT on file   No flexible sigmoidoscopy on file

## 2025-01-08 DIAGNOSIS — K21.9 GASTRO-ESOPHAGEAL REFLUX DISEASE WITHOUT ESOPHAGITIS: ICD-10-CM

## 2025-01-08 RX ORDER — PANTOPRAZOLE SODIUM 40 MG/1
40 TABLET, DELAYED RELEASE ORAL 2 TIMES DAILY
Qty: 180 TABLET | Refills: 1 | Status: SHIPPED | OUTPATIENT
Start: 2025-01-08

## 2025-01-08 NOTE — TELEPHONE ENCOUNTER
Medication Refill Request    Kim London is requesting a refill of the following medication(s):     Pantoprazole Sod Dr 40 MG Tab    Please send refill to:     [facility]/pharmacy #[id number] - [place], [place] - [address] [facility] [phone] - F [phone]  [address] [facility] [address]  Phone: [phone] Fax: [phone]

## 2025-02-05 ENCOUNTER — HOSPITAL ENCOUNTER (OUTPATIENT)
Facility: HOSPITAL | Age: 65
Discharge: HOME | End: 2025-02-08
Payer: MEDICARE

## 2025-02-05 DIAGNOSIS — F41.8 MIXED ANXIETY AND DEPRESSIVE DISORDER: ICD-10-CM

## 2025-02-05 DIAGNOSIS — F41.1 GENERALIZED ANXIETY DISORDER: ICD-10-CM

## 2025-02-05 DIAGNOSIS — F43.21 SITUATIONAL DEPRESSION: Primary | ICD-10-CM

## 2025-02-05 PROCEDURE — 99214 OFFICE O/P EST MOD 30 MIN: CPT | Performed by: MIDWIFE

## 2025-02-05 RX ORDER — SERTRALINE HYDROCHLORIDE 100 MG/1
100 TABLET, FILM COATED ORAL DAILY
Qty: 90 TABLET | Refills: 0 | Status: SHIPPED | OUTPATIENT
Start: 2025-02-05 | End: 2025-05-06

## 2025-02-05 NOTE — BH NOTE
[facility] Outpatient Behavioral Health  [facility]  Jojo Jay PMIGNACIO     Name: Kim London                                                  MRN:  [id number]           Follow up Medication Management Visit     Collateral: none     Patient Identification: Kim is a 64 year old   female who is retired. 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 She now watches her four grandchildren, who are diagnosed with autism. She lives on a property on which she built a tiny house for her son and his family but her grandchildren generally stay with her in her home. She he is engaged to Prince Ángel Gay (sp.?) who is a 48 year old [place] who works as a  for the "[employer].  Engaged, hasn't set a date but her fiancee is thinking about Valentines Day. He lives in [place] and [place], is , and has two children, one in [place] and one in [place]. She has met four of his six brothers. Kim's son, Eddie London, is seen here at [facility]. She also has a daughter named Rae who studies [employer]ian Linguistics at [employer].      Today:      CC: Patient presents for ongoing medication management  of anxiety, depression, and PTSD.      HPI: Kim has improvement in anxiety and depression with sertraline but feels it worked better when she first started it. She is better able to get things done around the house and to cope with the chaos that sometimes occurs with having children with ASD and a son with ADD living with her.     She thinks Ángel is coming to surprise her on Valentines Day. He did not come over the holidays but went to [place] with his brothers. He tells her she is everything to her, asks about her eating to make sure she is staying healthy. She is nervous about a potential visit and is cleaning her home to prepare. She is also somewhat

## 2025-02-12 DIAGNOSIS — I10 HYPERTENSION, ESSENTIAL: ICD-10-CM

## 2025-02-12 RX ORDER — CARVEDILOL 12.5 MG/1
12.5 TABLET ORAL 2 TIMES DAILY
Qty: 180 TABLET | Refills: 1 | Status: SHIPPED | OUTPATIENT
Start: 2025-02-12

## 2025-03-03 ENCOUNTER — HOSPITAL ENCOUNTER (OUTPATIENT)
Facility: HOSPITAL | Age: 65
Discharge: HOME | End: 2025-03-06
Payer: MEDICARE

## 2025-03-03 DIAGNOSIS — F41.8 MIXED ANXIETY AND DEPRESSIVE DISORDER: Primary | ICD-10-CM

## 2025-03-03 PROCEDURE — 99214 OFFICE O/P EST MOD 30 MIN: CPT | Performed by: MIDWIFE

## 2025-03-03 NOTE — BH NOTE
[facility]  [facility]  Jojo Jay PMHNNILDA     Name: Kim London                                                  MRN:  [id number]           Follow up Medication Management Visit     Collateral: none     Patient Identification: Kim is a 64 year old   female who is retired. 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 She now watches her four grandchildren, who are diagnosed with autism. She lives on a property on which she built a tiny house for her son and his family but her grandchildren generally stay with her in her home. She he is engaged to Prince Ángel Gay (sp.?) who is a 48 year old [place] who works as a  for the [employer].  Engaged, hasn't set a date but her fiancee is thinking about Valentines Day. He lives in [place] and [place], is , and has two children, one in [place] and one in [place]. She has met four of his six brothers. Kim's son, Eddie London, is seen here at [facility]. She also has a daughter named Rae who studies Maltese Linguistics at [employer].      Today:      CC: Patient presents for ongoing medication management  of anxiety, depression, and PTSD.      HPI:   Kim states she  is \"doing really well.\" She has been texting Ángel this morning and that always makes her happy.  She falls asleep at 2130 and is up off and on through the night with the kids, as they get up and are very active sometimes at night. Her son Eddie has the youngest in his room most of the time but Kim is the caretaker of the rest of the children.  Her grandson Jr is now in [place] for ASD; leaves at 0630 and gets home at 1600. He is seven and she is working to get his social security card. He receives his vaccines from the health department and needs to go to the ER when he is sick. She states

## 2025-04-03 ENCOUNTER — OFFICE VISIT (OUTPATIENT)
Age: 65
End: 2025-04-03
Payer: MEDICARE

## 2025-04-03 VITALS
WEIGHT: 134.5 LBS | BODY MASS INDEX: 24.75 KG/M2 | DIASTOLIC BLOOD PRESSURE: 80 MMHG | OXYGEN SATURATION: 95 % | HEART RATE: 54 BPM | HEIGHT: 62 IN | TEMPERATURE: 97.8 F | SYSTOLIC BLOOD PRESSURE: 122 MMHG | RESPIRATION RATE: 18 BRPM

## 2025-04-03 DIAGNOSIS — K31.84 GASTROPARESIS: ICD-10-CM

## 2025-04-03 DIAGNOSIS — Z91.89 COMPLIANCE WITH MEDICATION REGIMEN: ICD-10-CM

## 2025-04-03 DIAGNOSIS — R73.09 ELEVATED GLUCOSE: ICD-10-CM

## 2025-04-03 DIAGNOSIS — M54.50 CHRONIC BILATERAL LOW BACK PAIN WITHOUT SCIATICA: ICD-10-CM

## 2025-04-03 DIAGNOSIS — I10 HYPERTENSION, ESSENTIAL: ICD-10-CM

## 2025-04-03 DIAGNOSIS — K13.79 MOUTH PAIN: ICD-10-CM

## 2025-04-03 DIAGNOSIS — J45.20 MILD INTERMITTENT ASTHMA, UNCOMPLICATED: ICD-10-CM

## 2025-04-03 DIAGNOSIS — F41.1 GAD (GENERALIZED ANXIETY DISORDER): Primary | ICD-10-CM

## 2025-04-03 DIAGNOSIS — F43.10 PTSD (POST-TRAUMATIC STRESS DISORDER): ICD-10-CM

## 2025-04-03 DIAGNOSIS — G89.29 CHRONIC BILATERAL LOW BACK PAIN WITHOUT SCIATICA: ICD-10-CM

## 2025-04-03 DIAGNOSIS — K21.9 GASTRO-ESOPHAGEAL REFLUX DISEASE WITHOUT ESOPHAGITIS: ICD-10-CM

## 2025-04-03 DIAGNOSIS — D17.9 LIPOMA, UNSPECIFIED SITE: ICD-10-CM

## 2025-04-03 PROCEDURE — 36415 COLL VENOUS BLD VENIPUNCTURE: CPT | Performed by: NURSE PRACTITIONER

## 2025-04-03 PROCEDURE — 3074F SYST BP LT 130 MM HG: CPT | Performed by: NURSE PRACTITIONER

## 2025-04-03 PROCEDURE — 3079F DIAST BP 80-89 MM HG: CPT | Performed by: NURSE PRACTITIONER

## 2025-04-03 PROCEDURE — 99214 OFFICE O/P EST MOD 30 MIN: CPT | Performed by: NURSE PRACTITIONER

## 2025-04-03 PROCEDURE — 1123F ACP DISCUSS/DSCN MKR DOCD: CPT | Performed by: NURSE PRACTITIONER

## 2025-04-03 RX ORDER — CLINDAMYCIN HYDROCHLORIDE 300 MG/1
300 CAPSULE ORAL 2 TIMES DAILY
Qty: 14 CAPSULE | Refills: 0 | Status: SHIPPED | OUTPATIENT
Start: 2025-04-03 | End: 2025-04-10

## 2025-04-03 RX ORDER — ALPRAZOLAM 0.5 MG
0.5 TABLET ORAL 3 TIMES DAILY PRN
Qty: 90 TABLET | Refills: 2 | Status: SHIPPED | OUTPATIENT
Start: 2025-04-03 | End: 2025-07-02

## 2025-04-03 SDOH — ECONOMIC STABILITY: FOOD INSECURITY: WITHIN THE PAST 12 MONTHS, YOU WORRIED THAT YOUR FOOD WOULD RUN OUT BEFORE YOU GOT MONEY TO BUY MORE.: NEVER TRUE

## 2025-04-03 SDOH — ECONOMIC STABILITY: FOOD INSECURITY: WITHIN THE PAST 12 MONTHS, THE FOOD YOU BOUGHT JUST DIDN'T LAST AND YOU DIDN'T HAVE MONEY TO GET MORE.: NEVER TRUE

## 2025-04-03 ASSESSMENT — PATIENT HEALTH QUESTIONNAIRE - PHQ9
SUM OF ALL RESPONSES TO PHQ QUESTIONS 1-9: 0
SUM OF ALL RESPONSES TO PHQ QUESTIONS 1-9: 0
2. FEELING DOWN, DEPRESSED OR HOPELESS: NOT AT ALL
SUM OF ALL RESPONSES TO PHQ QUESTIONS 1-9: 0
1. LITTLE INTEREST OR PLEASURE IN DOING THINGS: NOT AT ALL
SUM OF ALL RESPONSES TO PHQ QUESTIONS 1-9: 0

## 2025-04-03 ASSESSMENT — ENCOUNTER SYMPTOMS
EYES NEGATIVE: 1
ROS SKIN COMMENTS: LUMP ON BACK
BACK PAIN: 1
RESPIRATORY NEGATIVE: 1

## 2025-04-03 NOTE — PROGRESS NOTES
Labs drawn in left arm per Vanessa Duarte's orders.  Patient tolerated well.\"Have you been to the ER, urgent care clinic since your last visit?  Hospitalized since your last visit?\"    NO    “Have you seen or consulted any other health care providers outside our system since your last visit?”    YES - When: approximately 1 months ago.  [facility] and [facility]    Have you had a mammogram?”   NO    Date of last Mammogram: 6/23/2022       “Have you had a colorectal cancer screening such as a colonoscopy/FIT/Cologuard?    NO    No colonoscopy on file  No cologuard on file  No FIT/FOBT on file   No flexible sigmoidoscopy on file   Pt will reschedule AWV for next visit       
understanding of diagnosis and treatment plan.      Return in about 3 months (around 7/3/2025) for alprazolam refill.        Patient has been advised to contact practice or seek care if condition persists or worsens.     JOHN Joshi - NP    Please note that this dictation was completed with computer voice recognition software. Quite often unanticipated grammatical, syntax, homophones, and other interpretive errors are inadvertently transcribed by the computer software. Please disregard and excuse any errors that have escaped final proofreading.

## 2025-04-04 ENCOUNTER — RESULTS FOLLOW-UP (OUTPATIENT)
Age: 65
End: 2025-04-04

## 2025-04-04 LAB
ALBUMIN SERPL-MCNC: 3.8 G/DL (ref 3.5–5)
ALBUMIN/GLOB SERPL: 1.1 (ref 1.1–2.2)
ALP SERPL-CCNC: 76 U/L (ref 45–117)
ALT SERPL-CCNC: 22 U/L (ref 12–78)
ANION GAP SERPL CALC-SCNC: 5 MMOL/L (ref 2–12)
AST SERPL-CCNC: 19 U/L (ref 15–37)
BASOPHILS # BLD: 0.13 K/UL (ref 0–0.1)
BASOPHILS NFR BLD: 1.6 % (ref 0–1)
BILIRUB SERPL-MCNC: 0.4 MG/DL (ref 0.2–1)
BUN SERPL-MCNC: 10 MG/DL (ref 6–20)
BUN/CREAT SERPL: 14 (ref 12–20)
CALCIUM SERPL-MCNC: 9.1 MG/DL (ref 8.5–10.1)
CHLORIDE SERPL-SCNC: 105 MMOL/L (ref 97–108)
CHOLEST SERPL-MCNC: 228 MG/DL
CO2 SERPL-SCNC: 31 MMOL/L (ref 21–32)
CREAT SERPL-MCNC: 0.7 MG/DL (ref 0.55–1.02)
DIFFERENTIAL METHOD BLD: ABNORMAL
EOSINOPHIL # BLD: 0.29 K/UL (ref 0–0.4)
EOSINOPHIL NFR BLD: 3.7 % (ref 0–7)
ERYTHROCYTE [DISTWIDTH] IN BLOOD BY AUTOMATED COUNT: 14.2 % (ref 11.5–14.5)
EST. AVERAGE GLUCOSE BLD GHB EST-MCNC: 100 MG/DL
GLOBULIN SER CALC-MCNC: 3.6 G/DL (ref 2–4)
GLUCOSE SERPL-MCNC: 88 MG/DL (ref 65–100)
HBA1C MFR BLD: 5.1 % (ref 4–5.6)
HCT VFR BLD AUTO: 45.1 % (ref 35–47)
HDLC SERPL-MCNC: 58 MG/DL
HDLC SERPL: 3.9 (ref 0–5)
HGB BLD-MCNC: 13.9 G/DL (ref 11.5–16)
IMM GRANULOCYTES # BLD AUTO: 0.02 K/UL (ref 0–0.04)
IMM GRANULOCYTES NFR BLD AUTO: 0.3 % (ref 0–0.5)
LDLC SERPL CALC-MCNC: 156.4 MG/DL (ref 0–100)
LYMPHOCYTES # BLD: 1.91 K/UL (ref 0.8–3.5)
LYMPHOCYTES NFR BLD: 24.1 % (ref 12–49)
MCH RBC QN AUTO: 28.7 PG (ref 26–34)
MCHC RBC AUTO-ENTMCNC: 30.8 G/DL (ref 30–36.5)
MCV RBC AUTO: 93 FL (ref 80–99)
MONOCYTES # BLD: 0.35 K/UL (ref 0–1)
MONOCYTES NFR BLD: 4.4 % (ref 5–13)
NEUTS SEG # BLD: 5.23 K/UL (ref 1.8–8)
NEUTS SEG NFR BLD: 65.9 % (ref 32–75)
NRBC # BLD: 0 K/UL (ref 0–0.01)
NRBC BLD-RTO: 0 PER 100 WBC
PLATELET # BLD AUTO: 290 K/UL (ref 150–400)
PMV BLD AUTO: 11.3 FL (ref 8.9–12.9)
POTASSIUM SERPL-SCNC: 4.8 MMOL/L (ref 3.5–5.1)
PROT SERPL-MCNC: 7.4 G/DL (ref 6.4–8.2)
RBC # BLD AUTO: 4.85 M/UL (ref 3.8–5.2)
SODIUM SERPL-SCNC: 141 MMOL/L (ref 136–145)
TRIGL SERPL-MCNC: 68 MG/DL
TSH SERPL DL<=0.05 MIU/L-ACNC: 0.57 UIU/ML (ref 0.36–3.74)
VLDLC SERPL CALC-MCNC: 13.6 MG/DL
WBC # BLD AUTO: 7.9 K/UL (ref 3.6–11)

## 2025-04-08 DIAGNOSIS — Z12.11 SCREENING FOR COLON CANCER: Primary | ICD-10-CM

## 2025-04-08 LAB
DRUG NAME: NORMAL
DRUGS UR: NORMAL
MED LIST NOT PROVIDED?: NO
MED LIST ON REQUISITION?: NO
MED LIST ON SEPARATE FORM?: NO
NO MEDICATION USE?: NO
RX NORM CODE: NORMAL
RX NORM SOURCE: NORMAL
RX NORM TEXT: NORMAL
SEQUENCE NUMBER: NORMAL

## 2025-04-08 RX ORDER — KETOROLAC TROMETHAMINE 10 MG/1
10 TABLET, FILM COATED ORAL EVERY 6 HOURS PRN
Qty: 6 TABLET | Refills: 0 | Status: SHIPPED | OUTPATIENT
Start: 2025-04-08 | End: 2026-04-08

## 2025-05-05 DIAGNOSIS — M54.6 PAIN IN THORACIC SPINE: ICD-10-CM

## 2025-05-06 RX ORDER — SERTRALINE HYDROCHLORIDE 100 MG/1
100 TABLET, FILM COATED ORAL DAILY
Qty: 90 TABLET | Refills: 0 | Status: SHIPPED | OUTPATIENT
Start: 2025-05-06 | End: 2025-08-04

## 2025-05-06 RX ORDER — CYCLOBENZAPRINE HCL 10 MG
TABLET ORAL
Qty: 180 TABLET | Refills: 1 | Status: SHIPPED | OUTPATIENT
Start: 2025-05-06

## 2025-05-06 NOTE — TELEPHONE ENCOUNTER
Patient requesting refill on     Requested Prescriptions     Pending Prescriptions Disp Refills    cyclobenzaprine (FLEXERIL) 10 MG tablet [Pharmacy Med Name: CYCLOBENZAPRINE 10 MG TABLET] 180 tablet 1     Sig: TAKE 1 TABLET BY MOUTH 2 TIMES DAILY AS NEEDED FOR MUSCLE SPASM(S). INDICATIONS: MUSCLE SPASM        Last OV 4/3/2025

## 2025-05-18 DIAGNOSIS — K31.84 GASTROPARESIS: ICD-10-CM

## 2025-05-19 RX ORDER — ONDANSETRON 8 MG/1
8 TABLET, ORALLY DISINTEGRATING ORAL EVERY 8 HOURS PRN
Qty: 30 TABLET | Refills: 2 | Status: SHIPPED | OUTPATIENT
Start: 2025-05-19

## 2025-05-19 NOTE — TELEPHONE ENCOUNTER
Patient requesting refill on     Requested Prescriptions     Pending Prescriptions Disp Refills    ondansetron (ZOFRAN-ODT) 8 MG TBDP disintegrating tablet [Pharmacy Med Name: ONDANSETRON ODT 8 MG TABLET] 30 tablet 2     Sig: TAKE 1 TABLET BY MOUTH EVERY 8 HOURS AS NEEDED FOR NAUSEA        Last OV 4/3/2025

## 2025-06-03 ENCOUNTER — HOSPITAL ENCOUNTER (OUTPATIENT)
Facility: HOSPITAL | Age: 65
Discharge: HOME | End: 2025-06-06
Payer: MEDICARE

## 2025-06-03 DIAGNOSIS — F41.8 MIXED ANXIETY AND DEPRESSIVE DISORDER: Primary | ICD-10-CM

## 2025-06-03 PROCEDURE — 99213 OFFICE O/P EST LOW 20 MIN: CPT | Performed by: MIDWIFE

## 2025-06-03 NOTE — BH NOTE
[facility]  [facility]  Jojo Jay PMHNNILDA     Name: Kim London                                                  MRN:  [id number]           Follow up Medication Management Visit     Collateral: none     Patient Identification: Kim is a 64 year old   female who is retired. 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 She now watches her four grandchildren, who are diagnosed with autism. She lives on a property on which she built a tiny house for her son and his family but her grandchildren generally stay with her in her home. She he is engaged to Prince Ángel Gay (sp.?) who is a 48 year old [place] who works as a  for the [employer].  Engaged, hasn't set a date but her fiancee is thinking about Valentines Day. He lives in [place] and [place], is , and has two children, one in [place] and one in [place]. She has met four of his six brothers. Kim's son, Eddie London, is seen here at [facility]. She also has a daughter named Rae who studies Malay Linguistics at [employer].      Today:      CC: Patient presents for ongoing medication management  of anxiety, depression, and PTSD.     Kim London, who was evaluated through a patient-initiated, synchronous (real-time) audio only encounter, and/or her healthcare decision maker, is aware that it is a billable service, with coverage as determined by her insurance carrier. She provided verbal consent to proceed: Yes. She has not had a related appointment within my department in the past 7 days or scheduled within the next 24 hours. The patient was located at Home. Provider was located at [facility].     Time in: 0900    Time out: 0922     HPI: Kim is doing \"real well.\"  She would like to decrease her dose of

## 2025-06-24 DIAGNOSIS — F41.1 GAD (GENERALIZED ANXIETY DISORDER): ICD-10-CM

## 2025-06-24 DIAGNOSIS — F43.10 PTSD (POST-TRAUMATIC STRESS DISORDER): ICD-10-CM

## 2025-06-24 RX ORDER — ALPRAZOLAM 0.5 MG
0.5 TABLET ORAL 3 TIMES DAILY PRN
Qty: 90 TABLET | Refills: 2 | Status: SHIPPED | OUTPATIENT
Start: 2025-06-24 | End: 2025-09-22

## 2025-06-24 NOTE — TELEPHONE ENCOUNTER
Medication Refill Request    Kim London is requesting a refill of the following medication(s):     Alprazolam 0.5 MG     Please send refill to:     [facility]/pharmacy #[id number] - [place], [place] - [address] [facility] [phone] - F [phone]  [address] [facility] [address]  Phone: [phone] Fax: [phone]

## 2025-07-01 NOTE — BH NOTE
[facility]  [facility]  Jojo Jay PMHNNILDA     Name: Kim London                                                  MRN:  [id number]           Follow up Medication Management Visit     Collateral: none     Patient Identification: Kim is a 64 year old   female who is retired. 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 She now watches her four grandchildren, who are diagnosed with autism. She lives on a property on which she built a tiny house for her son and his family but her grandchildren generally stay with her in her home. She he is engaged to Prince Ángel Gay (sp.?) who is a 48 year old [place] who works as a  for the [employer].  Engaged, hasn't set a date but her fiancee is thinking about Valentines Day. He lives in [place] and [place], is , and has two children, one in [place] and one in [place]. She has met four of his six brothers. Kim's son, Eddie London, is seen here at [facility]. She also has a daughter named Rae who studies Sami Linguistics at [employer].      Today:      CC: Patient presents for ongoing medication management  of anxiety, depression, and PTSD.      Kim London, who was evaluated through a patient-initiated, synchronous (real-time) audio only encounter, and/or her healthcare decision maker, is aware that it is a billable service, with coverage as determined by her insurance carrier. She provided verbal consent to proceed: Yes. She has not had a related appointment within my department in the past 7 days or scheduled within the next 24 hours. The patient was located at Home. Provider was located at [facility].      Time in: 1403     Time out: 1419     HPI: Patient is doing well. The decrease in sertraline has been effective. She is

## 2025-07-02 ENCOUNTER — HOSPITAL ENCOUNTER (OUTPATIENT)
Facility: HOSPITAL | Age: 65
Discharge: HOME | End: 2025-07-05

## 2025-07-02 DIAGNOSIS — F43.21 SITUATIONAL DEPRESSION: Primary | ICD-10-CM

## 2025-07-29 ENCOUNTER — TELEPHONE (OUTPATIENT)
Age: 65
End: 2025-07-29

## 2025-07-31 ENCOUNTER — TELEPHONE (OUTPATIENT)
Age: 65
End: 2025-07-31

## 2025-07-31 NOTE — TELEPHONE ENCOUNTER
Medication Refill Request    Kim TAN Surya is requesting a refill of the following medication(s):   Fluoxetine   Please send refill to:     [facility]/pharmacy #[id number] - [place] - [address] [facility] [phone] - F [phone]  [address] [facility] [address]  Phone: [phone] Fax: [phone]

## 2025-08-28 ENCOUNTER — OFFICE VISIT (OUTPATIENT)
Age: 65
End: 2025-08-28
Payer: MEDICARE

## 2025-08-28 VITALS
RESPIRATION RATE: 18 BRPM | WEIGHT: 127.4 LBS | HEART RATE: 60 BPM | HEIGHT: 62 IN | BODY MASS INDEX: 23.45 KG/M2 | TEMPERATURE: 98.6 F | DIASTOLIC BLOOD PRESSURE: 84 MMHG | SYSTOLIC BLOOD PRESSURE: 122 MMHG

## 2025-08-28 DIAGNOSIS — D17.1 LIPOMA OF BACK: ICD-10-CM

## 2025-08-28 DIAGNOSIS — Z00.00 MEDICARE ANNUAL WELLNESS VISIT, SUBSEQUENT: Primary | ICD-10-CM

## 2025-08-28 DIAGNOSIS — Z87.891 PERSONAL HISTORY OF TOBACCO USE: ICD-10-CM

## 2025-08-28 DIAGNOSIS — K31.84 GASTROPARESIS: ICD-10-CM

## 2025-08-28 DIAGNOSIS — F41.1 GAD (GENERALIZED ANXIETY DISORDER): ICD-10-CM

## 2025-08-28 PROCEDURE — G0296 VISIT TO DETERM LDCT ELIG: HCPCS | Performed by: NURSE PRACTITIONER

## 2025-08-28 PROCEDURE — 1123F ACP DISCUSS/DSCN MKR DOCD: CPT | Performed by: NURSE PRACTITIONER

## 2025-08-28 PROCEDURE — 3074F SYST BP LT 130 MM HG: CPT | Performed by: NURSE PRACTITIONER

## 2025-08-28 PROCEDURE — 3079F DIAST BP 80-89 MM HG: CPT | Performed by: NURSE PRACTITIONER

## 2025-08-28 PROCEDURE — G0439 PPPS, SUBSEQ VISIT: HCPCS | Performed by: NURSE PRACTITIONER

## 2025-08-28 ASSESSMENT — ENCOUNTER SYMPTOMS
EYES NEGATIVE: 1
GASTROINTESTINAL NEGATIVE: 1
RESPIRATORY NEGATIVE: 1
ROS SKIN COMMENTS: LUMP ON BACK

## 2025-08-28 ASSESSMENT — PATIENT HEALTH QUESTIONNAIRE - PHQ9
SUM OF ALL RESPONSES TO PHQ QUESTIONS 1-9: 0
2. FEELING DOWN, DEPRESSED OR HOPELESS: NOT AT ALL
1. LITTLE INTEREST OR PLEASURE IN DOING THINGS: NOT AT ALL
